# Patient Record
Sex: FEMALE | Race: WHITE | NOT HISPANIC OR LATINO | Employment: FULL TIME | URBAN - METROPOLITAN AREA
[De-identification: names, ages, dates, MRNs, and addresses within clinical notes are randomized per-mention and may not be internally consistent; named-entity substitution may affect disease eponyms.]

---

## 2013-11-13 LAB — EXTERNAL HIV CONFIRMATION: NORMAL

## 2019-09-26 ENCOUNTER — OFFICE VISIT (OUTPATIENT)
Dept: URGENT CARE | Facility: CLINIC | Age: 34
End: 2019-09-26
Payer: COMMERCIAL

## 2019-09-26 VITALS
TEMPERATURE: 97 F | WEIGHT: 285 LBS | OXYGEN SATURATION: 99 % | DIASTOLIC BLOOD PRESSURE: 88 MMHG | BODY MASS INDEX: 43.19 KG/M2 | HEART RATE: 92 BPM | RESPIRATION RATE: 20 BRPM | HEIGHT: 68 IN | SYSTOLIC BLOOD PRESSURE: 126 MMHG

## 2019-09-26 DIAGNOSIS — K13.79 PAIN IN MOUTH: Primary | ICD-10-CM

## 2019-09-26 PROCEDURE — 99213 OFFICE O/P EST LOW 20 MIN: CPT | Performed by: PHYSICIAN ASSISTANT

## 2019-09-26 RX ORDER — FLUCONAZOLE 100 MG/1
100 TABLET ORAL DAILY
Qty: 7 TABLET | Refills: 0 | Status: SHIPPED | OUTPATIENT
Start: 2019-09-26 | End: 2019-10-03

## 2019-09-26 NOTE — PROGRESS NOTES
[facility]        NAME: Whitney Troncoso is a 30 y o  female  : 1985    MRN: [id number]  DATE: 2019  TIME: 6:06 PM    Assessment and Plan   Pain in mouth [K13 79]  1  Pain in mouth  fluconazole (DIFLUCAN) 100 mg tablet         Patient Instructions     Start Diflucan as prescribed  If no improvement follow up with family doctor for further evaluation  Follow up with PCP in 3-5 days  Proceed to  ER if symptoms worsen  Chief Complaint     Chief Complaint   Patient presents with    Cough     cough,sore throat, chest congestion for 1 week    Oral Pain     tongue is very sore for 1 week         History of Present Illness       Patient presents with a sore throat for the past week but now she is having at tongue pain which she describes as a burning pain  Patient denies any oral lesions or further cold symptoms fever chills  Patient states the pain extends into her esophagus  She has not taken any recent antibiotics  Denies any skin rashes  Review of Systems   Review of Systems   Constitutional: Negative for chills and fever  HENT: Positive for sore throat  Negative for dental problem, postnasal drip and trouble swallowing  Respiratory: Negative for cough  Gastrointestinal: Negative for nausea and vomiting  Musculoskeletal: Negative for arthralgias and myalgias  Skin: Negative for rash  Hematological: Negative for adenopathy           Current Medications       Current Outpatient Medications:     fluconazole (DIFLUCAN) 100 mg tablet, Take 1 tablet (100 mg total) by mouth daily for 7 days, Disp: 7 tablet, Rfl: 0    Current Allergies     Allergies as of 2019 - Reviewed 2019   Allergen Reaction Noted    Shellfish-derived products  2019    Tetanus toxoid Hives and Edema 2016            The following portions of the patient's history were reviewed and updated as appropriate: allergies, current medications, past family history, past medical history, past social history, past surgical history and problem list      History reviewed  No pertinent past medical history  Past Surgical History:   Procedure Laterality Date     SECTION      EYE SURGERY         History reviewed  No pertinent family history  Medications have been verified  Objective   /88   Pulse 92   Temp (!) 97 °F (36 1 °C) (Tympanic)   Resp 20   Ht 5' 8" (1 727 m)   Wt 129 kg (285 lb)   SpO2 99%   BMI 43 33 kg/m²        Physical Exam     Physical Exam   Constitutional: She is oriented to person, place, and time  She appears well-developed and well-nourished  HENT:   Head: Normocephalic and atraumatic  Right Ear: External ear normal    Left Ear: External ear normal    Nose: Nose normal    Geographic tongue  No oral plaques  Posterior pharynx soft palate uvula without any erythema no exudates  Neck: Neck supple  Cardiovascular: Normal rate, regular rhythm and normal heart sounds  Pulmonary/Chest: Effort normal and breath sounds normal    Lymphadenopathy:     She has no cervical adenopathy  Neurological: She is alert and oriented to person, place, and time  Skin: Skin is warm and dry  Psychiatric: She has a normal mood and affect  Her behavior is normal    Nursing note and vitals reviewed

## 2019-09-26 NOTE — PATIENT INSTRUCTIONS
Start Diflucan as prescribed  If no improvement follow up with family doctor for further evaluation

## 2020-01-01 ENCOUNTER — OFFICE VISIT (OUTPATIENT)
Dept: URGENT CARE | Facility: CLINIC | Age: 35
End: 2020-01-01
Payer: COMMERCIAL

## 2020-01-01 VITALS
RESPIRATION RATE: 18 BRPM | DIASTOLIC BLOOD PRESSURE: 89 MMHG | HEART RATE: 103 BPM | SYSTOLIC BLOOD PRESSURE: 146 MMHG | TEMPERATURE: 99 F | OXYGEN SATURATION: 98 %

## 2020-01-01 DIAGNOSIS — J02.0 STREP THROAT: Primary | ICD-10-CM

## 2020-01-01 PROCEDURE — 99213 OFFICE O/P EST LOW 20 MIN: CPT | Performed by: NURSE PRACTITIONER

## 2020-01-01 RX ORDER — PREDNISONE 20 MG/1
20 TABLET ORAL 2 TIMES DAILY WITH MEALS
Qty: 10 TABLET | Refills: 0 | Status: SHIPPED | OUTPATIENT
Start: 2020-01-01 | End: 2020-01-06

## 2020-01-01 RX ORDER — AMOXICILLIN 875 MG/1
875 TABLET, COATED ORAL 2 TIMES DAILY
Qty: 20 TABLET | Refills: 0 | Status: SHIPPED | OUTPATIENT
Start: 2020-01-01 | End: 2020-01-11

## 2020-01-01 NOTE — PROGRESS NOTES
[facility]        NAME: Fabiana Cruz is a 30 y o  female  : 1985    MRN: [id number]  DATE: 2020  TIME: 2:58 PM    Assessment and Plan   Strep throat [J02 0]  1  Strep throat  amoxicillin (AMOXIL) 875 mg tablet    predniSONE 20 mg tablet         Patient Instructions   May use any of the following for symptomatic control of sore throat symptoms:  Saltwater gargles, tea with honey, lozenges, Chloraseptic spray  Follow up with PCP in 3-5 days  Proceed to  ER if symptoms worsen  Chief Complaint     Chief Complaint   Patient presents with    Sore Throat     Pt c/o a sore throat and swollen glands for a three days  History of Present Illness       Sore Throat    This is a new problem  Episode onset: Three days  Associated symptoms include headaches, swollen glands and trouble swallowing  Pertinent negatives include no coughing or shortness of breath  She has tried cool liquids for the symptoms  The treatment provided no relief  Review of Systems   Review of Systems   Constitutional: Positive for chills and fatigue  HENT: Positive for sore throat and trouble swallowing  Respiratory: Negative for cough, chest tightness, shortness of breath and wheezing  Neurological: Positive for headaches           Current Medications       Current Outpatient Medications:     amoxicillin (AMOXIL) 875 mg tablet, Take 1 tablet (875 mg total) by mouth 2 (two) times a day for 10 days, Disp: 20 tablet, Rfl: 0    predniSONE 20 mg tablet, Take 1 tablet (20 mg total) by mouth 2 (two) times a day with meals for 5 days, Disp: 10 tablet, Rfl: 0    Current Allergies     Allergies as of 2020 - Reviewed 2020   Allergen Reaction Noted    Shellfish-derived products  2019    Tetanus toxoid Hives and Edema 2016            The following portions of the patient's history were reviewed and updated as appropriate: allergies, current medications, past family history, past medical history, past social history, past surgical history and problem list      History reviewed  No pertinent past medical history  Past Surgical History:   Procedure Laterality Date     SECTION      EYE SURGERY         History reviewed  No pertinent family history  Medications have been verified  Objective   /89   Pulse 103   Temp 99 °F (37 2 °C)   Resp 18   SpO2 98%        Physical Exam     Physical Exam   Constitutional: She is oriented to person, place, and time  She appears well-developed and well-nourished  Non-toxic appearance  She does not appear ill  HENT:   Right Ear: Tympanic membrane normal    Left Ear: Tympanic membrane normal    Mouth/Throat: Posterior oropharyngeal erythema present  Tonsils are 2+ on the right  Tonsillar exudate  Lymphadenopathy:     She has cervical adenopathy  Neurological: She is alert and oriented to person, place, and time  Skin: Skin is warm and dry  Psychiatric: She has a normal mood and affect  Her behavior is normal    Nursing note and vitals reviewed

## 2020-01-01 NOTE — PATIENT INSTRUCTIONS
Strep Throat   WHAT YOU NEED TO KNOW:   Strep throat is a throat infection caused by bacteria  It is easily spread from person to person  DISCHARGE INSTRUCTIONS:   Call 911 for any of the following:   · You have trouble breathing  Return to the emergency department if:   · You have new symptoms like a bad headache, stiff neck, chest pain, or vomiting  · You are drooling because you cannot swallow your spit  Contact your healthcare provider if:   · You have a fever  · You have a rash or ear pain  · You have green, yellow-brown, or bloody mucus when you cough or blow your nose  · You are unable to drink anything  · You have questions or concerns about your condition or care  Medicines:   · Antibiotics  help treat your strep throat  You should feel better within 2 to 3 days after you start antibiotics  · Take your medicine as directed  Contact your healthcare provider if you think your medicine is not helping or if you have side effects  Tell him or her if you are allergic to any medicine  Keep a list of the medicines, vitamins, and herbs you take  Include the amounts, and when and why you take them  Bring the list or the pill bottles to follow-up visits  Carry your medicine list with you in case of an emergency  Manage your symptoms:   · Use lozenges, ice, soft foods, or popsicles  to soothe your throat  · Drink juice, milk shakes, or soup  if your throat is too sore to eat solid food  Drinking liquids can also help prevent dehydration  · Gargle with salt water  Mix ¼ teaspoon salt in a glass of warm water and gargle  This may help reduce swelling in your throat  · Do not smoke  Nicotine and other chemicals in cigarettes and cigars can cause lung damage and make your symptoms worse  Ask your healthcare provider for information if you currently smoke and need help to quit  E-cigarettes or smokeless tobacco still contain nicotine   Talk to your healthcare provider before you use these products  Return to work or school  24 hours after you start antibiotic medicine  Prevent the spread of strep throat:   · Wash your hands often  Use soap and water  Wash your hands after you use the bathroom, change a child's diapers, or sneeze  Wash your hands before you prepare or eat food  · Do not share food or drinks  Replace your toothbrush after you have taken antibiotics for 24 hours  Follow up with your healthcare provider as directed:  Write down your questions so you remember to ask them during your visits  © 2017 [employer] Information is for End User's use only and may not be sold, redistributed or otherwise used for commercial purposes  All illustrations and images included in CareNotes® are the copyrighted property of A [employer] M [employer]  or [employer]  The above information is an  only  It is not intended as medical advice for individual conditions or treatments  Talk to your doctor, nurse or pharmacist before following any medical regimen to see if it is safe and effective for you

## 2020-03-24 ENCOUNTER — OFFICE VISIT (OUTPATIENT)
Dept: URGENT CARE | Facility: CLINIC | Age: 35
End: 2020-03-24
Payer: COMMERCIAL

## 2020-03-24 VITALS
RESPIRATION RATE: 18 BRPM | WEIGHT: 293 LBS | TEMPERATURE: 98.5 F | DIASTOLIC BLOOD PRESSURE: 90 MMHG | HEART RATE: 101 BPM | BODY MASS INDEX: 44.41 KG/M2 | HEIGHT: 68 IN | OXYGEN SATURATION: 98 % | SYSTOLIC BLOOD PRESSURE: 161 MMHG

## 2020-03-24 DIAGNOSIS — J06.9 ACUTE URI: Primary | ICD-10-CM

## 2020-03-24 PROCEDURE — 99213 OFFICE O/P EST LOW 20 MIN: CPT | Performed by: PHYSICIAN ASSISTANT

## 2020-03-24 RX ORDER — AZITHROMYCIN 250 MG/1
TABLET, FILM COATED ORAL
Qty: 6 TABLET | Refills: 0 | Status: SHIPPED | OUTPATIENT
Start: 2020-03-24 | End: 2020-03-28

## 2020-03-24 NOTE — PROGRESS NOTES
[facility]    NAME: Nawaf Hackett is a 37 y o  female  : 1985    MRN: [id number]  DATE: 2020  TIME: 4:59 PM    Assessment and Plan   Acute URI [J06 9]  1  Acute URI  azithromycin (ZITHROMAX) 250 mg tablet       Patient Instructions     Patient Instructions   I have prescribed an antibiotic for the infection  Please take the antibiotic as prescribed and finish the entire prescription  I recommend that the patient takes an over the counter probiotic or eats yogurt with live cultures in it [place]) to keep good bacteria in the gut and help prevent diarrhea  Wash hands frequently to prevent the spread of infection  Can use over the counter cough and cold medications to help with symptoms  Ibuprofen and/or tylenol as needed for pain or fever  If not improving over the next 7-10 days, follow up with PCP  Chief Complaint     Chief Complaint   Patient presents with    Sore Throat     pain in throat , chest congestion, x 3 days       History of Present Illness   81-year-old female here with complaint of sore throat, cough and occasional chest congestion for the last 3 days  No fever chills  Feels like there is mucus in the back of her throat  Review of Systems   Review of Systems   Constitutional: Negative for appetite change, chills and fever  HENT: Positive for sore throat  Negative for congestion, ear discharge, ear pain, facial swelling, postnasal drip, sinus pressure and sneezing  Respiratory: Positive for cough  Negative for shortness of breath and wheezing  Neurological: Negative for headaches         Current Medications     Current Outpatient Medications:     levonorgestrel (MIRENA) 20 MCG/24HR IUD, 1 each by Intrauterine route once, Disp: , Rfl:     azithromycin (ZITHROMAX) 250 mg tablet, Take 2 tablets today then 1 tablet daily x 4 days, Disp: 6 tablet, Rfl: 0    Current Allergies     Allergies as of 2020 - Reviewed 2020   Allergen Reaction Noted  Shellfish-derived products  2019    Tetanus toxoid Hives and Edema 2016          The following portions of the patient's history were reviewed and updated as appropriate: allergies, current medications, past family history, past medical history, past social history, past surgical history and problem list    Past Medical History:   Diagnosis Date    GERD (gastroesophageal reflux disease)     MRSA infection     bladder/ urine     Past Surgical History:   Procedure Laterality Date     SECTION      EYE SURGERY       History reviewed  No pertinent family history    Social History     Socioeconomic History    Marital status: /Civil Union     Spouse name: Not on file    Number of children: Not on file    Years of education: Not on file    Highest education level: Not on file   Occupational History    Not on file   Social Needs    Financial resource strain: Not on file    Food insecurity:     Worry: Not on file     Inability: Not on file    Transportation needs:     Medical: Not on file     Non-medical: Not on file   Tobacco Use    Smoking status: Never Smoker    Smokeless tobacco: Never Used   Substance and Sexual Activity    Alcohol use: Not Currently    Drug use: Never    Sexual activity: Yes     Birth control/protection: IUD   Lifestyle    Physical activity:     Days per week: Not on file     Minutes per session: Not on file    Stress: Not on file   Relationships    Social connections:     Talks on phone: Not on file     Gets together: Not on file     Attends Hindu service: Not on file     Active member of club or organization: Not on file     Attends meetings of clubs or organizations: Not on file     Relationship status: Not on file    Intimate partner violence:     Fear of current or ex partner: Not on file     Emotionally abused: Not on file     Physically abused: Not on file     Forced sexual activity: Not on file   Other Topics Concern    Not on file Social History Narrative    Not on file     Medications have been verified  Objective   /90   Pulse 101   Temp 98 5 °F (36 9 °C) (Tympanic)   Resp 18   Ht 5' 8" (1 727 m)   Wt 134 kg (295 lb)   LMP 03/24/2020 (Approximate)   SpO2 98%   BMI 44 85 kg/m²      Physical Exam   Physical Exam   Constitutional: She appears well-developed and well-nourished  No distress  HENT:   Head: Normocephalic and atraumatic  Right Ear: Tympanic membrane normal    Left Ear: Tympanic membrane normal    Nose: Nose normal  No mucosal edema or rhinorrhea  Right sinus exhibits no maxillary sinus tenderness and no frontal sinus tenderness  Left sinus exhibits no maxillary sinus tenderness and no frontal sinus tenderness  Mouth/Throat: Posterior oropharyngeal erythema present  No oropharyngeal exudate or posterior oropharyngeal edema  Eyes: Conjunctivae are normal    Cardiovascular: Normal rate, regular rhythm and normal heart sounds  No murmur heard  Nursing note and vitals reviewed

## 2020-04-01 ENCOUNTER — OFFICE VISIT (OUTPATIENT)
Dept: URGENT CARE | Facility: CLINIC | Age: 35
End: 2020-04-01
Payer: COMMERCIAL

## 2020-04-01 VITALS
WEIGHT: 293 LBS | DIASTOLIC BLOOD PRESSURE: 87 MMHG | HEIGHT: 68 IN | SYSTOLIC BLOOD PRESSURE: 130 MMHG | RESPIRATION RATE: 18 BRPM | BODY MASS INDEX: 44.41 KG/M2 | HEART RATE: 108 BPM | TEMPERATURE: 97.7 F | OXYGEN SATURATION: 98 %

## 2020-04-01 DIAGNOSIS — J06.9 ACUTE URI: Primary | ICD-10-CM

## 2020-04-01 PROCEDURE — 99213 OFFICE O/P EST LOW 20 MIN: CPT | Performed by: PHYSICIAN ASSISTANT

## 2020-04-01 RX ORDER — METHYLPREDNISOLONE 4 MG/1
TABLET ORAL
Qty: 21 TABLET | Refills: 0 | Status: SHIPPED | OUTPATIENT
Start: 2020-04-01 | End: 2020-05-01 | Stop reason: ALTCHOICE

## 2020-04-01 RX ORDER — ALBUTEROL SULFATE 90 UG/1
2 AEROSOL, METERED RESPIRATORY (INHALATION) EVERY 6 HOURS PRN
Qty: 8.5 G | Refills: 0 | Status: SHIPPED | OUTPATIENT
Start: 2020-04-01 | End: 2020-05-01 | Stop reason: ALTCHOICE

## 2021-08-31 ENCOUNTER — TELEPHONE (OUTPATIENT)
Dept: ADMINISTRATIVE | Facility: OTHER | Age: 36
End: 2021-08-31

## 2021-08-31 NOTE — TELEPHONE ENCOUNTER
----- Message from Leona Baer sent at 8/30/2021  3:20 PM EDT -----  Regarding: HIV screen  08/30/21 3:20 PM    Hello, our patient Jair Valdez has had HIV completed/performed  Please assist in updating the patient chart by pulling the Care Everywhere (CE) document  The date of service is 11/13/13       Thank you,  Leona [facility]

## 2021-09-02 ENCOUNTER — OFFICE VISIT (OUTPATIENT)
Dept: FAMILY MEDICINE CLINIC | Facility: CLINIC | Age: 36
End: 2021-09-02
Payer: COMMERCIAL

## 2021-09-02 VITALS
BODY MASS INDEX: 44.41 KG/M2 | HEART RATE: 97 BPM | WEIGHT: 293 LBS | OXYGEN SATURATION: 96 % | SYSTOLIC BLOOD PRESSURE: 132 MMHG | TEMPERATURE: 99 F | HEIGHT: 68 IN | DIASTOLIC BLOOD PRESSURE: 88 MMHG

## 2021-09-02 DIAGNOSIS — R10.9 ABDOMINAL CRAMPING: ICD-10-CM

## 2021-09-02 DIAGNOSIS — Z76.89 ENCOUNTER TO ESTABLISH CARE WITH NEW DOCTOR: Primary | ICD-10-CM

## 2021-09-02 DIAGNOSIS — Z13.220 LIPID SCREENING: ICD-10-CM

## 2021-09-02 DIAGNOSIS — E66.01 MORBID OBESITY (HCC): ICD-10-CM

## 2021-09-02 DIAGNOSIS — Z12.4 CERVICAL CANCER SCREENING: ICD-10-CM

## 2021-09-02 DIAGNOSIS — Z79.899 LONG-TERM CURRENT USE OF PROTON PUMP INHIBITOR THERAPY: ICD-10-CM

## 2021-09-02 DIAGNOSIS — R11.0 POSTPRANDIAL NAUSEA: ICD-10-CM

## 2021-09-02 DIAGNOSIS — K21.9 GASTROESOPHAGEAL REFLUX DISEASE, UNSPECIFIED WHETHER ESOPHAGITIS PRESENT: ICD-10-CM

## 2021-09-02 DIAGNOSIS — K52.9 POSTPRANDIAL DIARRHEA: ICD-10-CM

## 2021-09-02 DIAGNOSIS — Z13.29 THYROID DISORDER SCREEN: ICD-10-CM

## 2021-09-02 PROCEDURE — 99203 OFFICE O/P NEW LOW 30 MIN: CPT | Performed by: FAMILY MEDICINE

## 2021-09-02 RX ORDER — OMEPRAZOLE 20 MG/1
20 CAPSULE, DELAYED RELEASE ORAL 2 TIMES DAILY
COMMUNITY
End: 2021-09-23

## 2021-09-02 NOTE — PROGRESS NOTES
Assessment/Plan:         Diagnoses and all orders for this visit:    Encounter to establish care with new doctor    Postprandial diarrhea  -     Comprehensive metabolic panel; Future  -     CBC and differential; Future  -     TSH, 3rd generation with Free T4 reflex; Future  -     Ambulatory referral to Gastroenterology; Future    Postprandial nausea  -     Comprehensive metabolic panel; Future  -     CBC and differential; Future  -     TSH, 3rd generation with Free T4 reflex; Future  -     H  pylori antigen, stool; Future  -     Ambulatory referral to Gastroenterology; Future    Abdominal cramping  -     Comprehensive metabolic panel; Future  -     CBC and differential; Future  -     TSH, 3rd generation with Free T4 reflex; Future  -     H  pylori antigen, stool; Future  -     Ambulatory referral to Gastroenterology; Future    Gastroesophageal reflux disease, unspecified whether esophagitis present  -     H  pylori antigen, stool; Future  -     Ambulatory referral to Gastroenterology; Future    Long-term current use of proton pump inhibitor therapy  -     H  pylori antigen, stool; Future  -     Ambulatory referral to Gastroenterology; Future    Morbid obesity (HCC)  -     TSH, 3rd generation with Free T4 reflex; Future    Cervical cancer screening  -     Ambulatory referral to Gynecology; Future    Lipid screening  -     Lipid panel; Future    Thyroid disorder screen  -     TSH, 3rd generation with Free T4 reflex; Future    Other orders  -     omeprazole (PriLOSEC) 20 mg delayed release capsule; Take 20 mg by mouth 2 (two) times a day          Subjective:   Chief Complaint   Patient presents with   [address] pt - Pt has some concerns about diarrhea after eating @ restaurants        Patient ID: Elin Fletcher is a 31 y o  female      New pt  Has not had a PCP for many years  Sees [facility]- had her  karla IUD removed and new one re-inserted 2020  C/o almost or at least a year and a half gets postprandial abd cramping, nausea, diarrhea mostly after eating out at any restaurant, sx resolve after she has a bowel movement (diarrhea) "it got worse this year" admits that sx do occur when she eats food she has prepared at home, "but not as bad"  Never any blood in the stool, and except for the cramping, no abd pain assoc  bp slightly elevated- pt states nervous coming here today      The following portions of the patient's history were reviewed and updated as appropriate: allergies, current medications, past family history, past medical history, past social history, past surgical history and problem list     Review of Systems   All other systems reviewed and are negative  Objective:      /88 (BP Location: Right arm, Patient Position: Sitting, Cuff Size: Large)   Pulse 97   Temp 99 °F (37 2 °C) (Tympanic)   Ht 5' 8" (1 727 m)   Wt (!) 152 kg (335 lb 6 4 oz)   SpO2 96%   BMI 51 00 kg/m²   stool       Physical Exam  Vitals and nursing note reviewed  Constitutional:       General: She is not in acute distress  Appearance: She is well-developed  She is not ill-appearing, toxic-appearing or diaphoretic  HENT:      Head: Normocephalic and atraumatic  Eyes:      General: Lids are normal       Conjunctiva/sclera: Conjunctivae normal       Pupils: Pupils are equal, round, and reactive to light  Neck:      Thyroid: No thyroid mass or thyromegaly  Vascular: No JVD  Trachea: Trachea normal    Cardiovascular:      Rate and Rhythm: Normal rate and regular rhythm  Heart sounds: Normal heart sounds  Comments: No edema  Pulmonary:      Effort: Pulmonary effort is normal       Breath sounds: Normal breath sounds  Abdominal:      General: Bowel sounds are normal  There is no distension  Palpations: Abdomen is soft  There is no hepatomegaly, splenomegaly or mass  Tenderness: There is no abdominal tenderness  Musculoskeletal:      Cervical back: Neck supple  Lymphadenopathy:      Cervical: No cervical adenopathy  Upper Body:      Right upper body: No supraclavicular adenopathy  Left upper body: No supraclavicular adenopathy  Skin:     General: Skin is warm and dry  Capillary Refill: Capillary refill takes less than 2 seconds  Coloration: Skin is not pale  Neurological:      Mental Status: She is alert and oriented to person, place, and time  Gait: Gait normal    Psychiatric:         Mood and Affect: Mood is anxious (mildly)  Behavior: Behavior normal  Behavior is cooperative  BMI Counseling: Body mass index is 51 kg/m²  The BMI is above normal  Nutrition recommendations include reducing portion sizes and decreasing overall calorie intake  Exercise recommendations include exercising 3-5 times per week

## 2021-09-23 ENCOUNTER — CONSULT (OUTPATIENT)
Dept: GASTROENTEROLOGY | Facility: CLINIC | Age: 36
End: 2021-09-23
Payer: COMMERCIAL

## 2021-09-23 VITALS
RESPIRATION RATE: 16 BRPM | SYSTOLIC BLOOD PRESSURE: 154 MMHG | HEART RATE: 118 BPM | TEMPERATURE: 97.3 F | DIASTOLIC BLOOD PRESSURE: 98 MMHG | HEIGHT: 68 IN | BODY MASS INDEX: 44.41 KG/M2 | WEIGHT: 293 LBS

## 2021-09-23 DIAGNOSIS — K21.9 GASTROESOPHAGEAL REFLUX DISEASE, UNSPECIFIED WHETHER ESOPHAGITIS PRESENT: ICD-10-CM

## 2021-09-23 DIAGNOSIS — K52.9 POSTPRANDIAL DIARRHEA: ICD-10-CM

## 2021-09-23 DIAGNOSIS — R11.0 POSTPRANDIAL NAUSEA: ICD-10-CM

## 2021-09-23 DIAGNOSIS — R10.9 ABDOMINAL CRAMPING: ICD-10-CM

## 2021-09-23 DIAGNOSIS — Z79.899 LONG-TERM CURRENT USE OF PROTON PUMP INHIBITOR THERAPY: ICD-10-CM

## 2021-09-23 PROCEDURE — 3008F BODY MASS INDEX DOCD: CPT | Performed by: INTERNAL MEDICINE

## 2021-09-23 PROCEDURE — 1036F TOBACCO NON-USER: CPT | Performed by: INTERNAL MEDICINE

## 2021-09-23 PROCEDURE — 99243 OFF/OP CNSLTJ NEW/EST LOW 30: CPT | Performed by: INTERNAL MEDICINE

## 2021-09-23 RX ORDER — OMEPRAZOLE 40 MG/1
40 CAPSULE, DELAYED RELEASE ORAL 2 TIMES DAILY
Qty: 90 CAPSULE | Refills: 3 | Status: SHIPPED | OUTPATIENT
Start: 2021-09-23 | End: 2022-02-22 | Stop reason: SDUPTHER

## 2021-09-23 RX ORDER — SUCRALFATE ORAL 1 G/10ML
1 SUSPENSION ORAL 4 TIMES DAILY
Qty: 420 ML | Refills: 3 | Status: SHIPPED | OUTPATIENT
Start: 2021-09-23 | End: 2022-01-11

## 2021-09-23 NOTE — PROGRESS NOTES
[facility] - Outpatient Consultation  Rose Cesar 31 y o  female MRN: [id number]  Encounter: [id number]    HPI:    Rose Cesar is a 31 y o  female who presents with complaints of chronic GERD and chronic diarrhea  Diarrhea  She reports postprandial diarrhea for 2 years, getting worse  This is caused by all foods but more often when eating out  She cannot identify specific food triggers  She drinks 2 glasses of milk at night to help manage her GERD; she also uses cream in he coffee; eats cheese; eatch ice cream daily; and eats sour cream occasionally  She has 4 BMs in the morning within 30 minutes of each other: the first 2 are formed, then diarrhea  She then has 2 more watery BMs after lunch; 2 more BMs after dinner; and once more before bed  All stools are watery except the first 2 in the morning  Recently has been seeing small blood with wiping  She reports that whenever she eats out, regardless of food or restaurant, she gets cold sweats and nausea, followed by diarrhea  GERD  She has had GERD since age 27  She takes omeprazole 20 mg bid, prn Tums, and 2 glasses of milk at night  She often wakes up choking and coughing at night  She avoids spicy spicy foods  She does eat before bed  No FH of IBD or GI cancers  She has been gaining weight; current BMI is 52  No endoscopic evaluation  REVIEW OF SYSTEMS:  CONSTITUTIONAL: Denies any fever, chills, rigors, and weight loss  HEENT: No earache or tinnitus  Denies hearing loss or visual disturbances  CARDIOVASCULAR: No chest pain or palpitations  RESPIRATORY: Denies any cough, hemoptysis, shortness of breath or dyspnea on exertion  GASTROINTESTINAL: As noted in the History of Present Illness  GENITOURINARY: No problems with urination  Denies any hematuria or dysuria  NEUROLOGIC: No dizziness or vertigo, denies headaches  MUSCULOSKELETAL: Denies any muscle or joint pain     SKIN: Denies skin rashes or itching  ENDOCRINE: Denies excessive thirst  Denies intolerance to heat or cold  PSYCHOSOCIAL: Denies depression or anxiety  Denies any recent memory loss  Historical Information   Past Medical History:   Diagnosis Date    GERD (gastroesophageal reflux disease)     MRSA infection     bladder/ urine     Past Surgical History:   Procedure Laterality Date     SECTION      EYE SURGERY      strabismus     Social History   Social History     Substance and Sexual Activity   Alcohol Use Not Currently    Alcohol/week: 0 0 standard drinks    Comment: rare     Social History     Substance and Sexual Activity   Drug Use Never     Social History     Tobacco Use   Smoking Status Never Smoker   Smokeless Tobacco Never Used     Family History   Problem Relation Age of Onset    COPD Mother     Colon polyps Mother     Diabetes Father     Colon polyps Father     No Known Problems Sister     No Known Problems Brother     No Known Problems Son     No Known Problems Daughter     No Known Problems Brother     No Known Problems Brother     No Known Problems Brother     No Known Problems Sister     Gallbladder disease Sister        Meds/Allergies       Current Outpatient Medications:     levonorgestrel (MIRENA) 20 MCG/24HR IUD    omeprazole (PriLOSEC) 40 MG capsule    sucralfate (CARAFATE) 1 g/10 mL suspension    Allergies   Allergen Reactions    Blackberry Flavor - Food Allergy Hives    Shellfish-Derived Products - Food Allergy     Tetanus Toxoid Hives and Edema       Objective   Blood pressure 154/98, pulse (!) 118, temperature (!) 97 3 °F (36 3 °C), temperature source Tympanic, resp  rate 16, height 5' 8" (1 727 m), weight (!) 156 kg (344 lb)  Body mass index is 52 31 kg/m²  PHYSICAL EXAM:    General Appearance:   Alert, cooperative, no distress   HEENT:   Normocephalic, atraumatic, anicteric       Neck:  Supple, symmetrical, trachea midline   Lungs:   Clear to auscultation bilaterally; no rales, rhonchi or wheezing; respirations unlabored    Heart[de-identified]   Regular rate and rhythm; no murmur, rub, or gallop  Abdomen:   Soft, non-tender, non-distended; normal bowel sounds; no masses, no organomegaly    Genitalia:   Deferred    Rectal:   Deferred    Extremities:  No cyanosis, clubbing or edema    Pulses:  2+ and symmetric    Skin:  No jaundice, rashes, or lesions    Lymph nodes:  No palpable cervical lymphadenopathy        Lab Results:   No visits with results within 1 Day(s) from this visit  Latest known visit with results is:   Telephone on 08/31/2021   Component Date Value    HIV Confirmation 11/13/2013 Non-Reactive        No results found for: WBC, HGB, HCT, MCV, PLT    No results found for: NA, SODIUM, K, CL, CO2, ANIONGAP, AGAP, BUN, CREATININE, GLUC, GLUF, CALCIUM, AST, ALT, ALKPHOS, PROT, TP, BILITOT, TBILI, EGFR    No results found for: CRP    No results found for: [id number], TSH    No results found for: IRON, TIBC, FERRITIN    Radiology Results:   No results found  ______________________________________________________________________  ASSESSMENT AND PLAN:    Mark Bazzi is a 31 y o  female with chronic diarrhea and GERD  Diarrhea  This has not been worked up so far  I suspect at least a component of lactose intolerance, but the differential diagnosis is broad  We will start with the following:  - I asked her for a 7-10 day trial of strict lactose avoidance  She will do her best to comply  - Stool for C  diff, o&p, other bacteria, calprotectin  - Blood for TTG, IgA, CRP, TSH  - She is also getting CBC and CMP  - Colonoscopy if above is unrevealing    GERD  Liekly exacerbated by obesity,  which she understands  She will need EGD to screen for Aviles's given long-standing symptoms; we will wait to do this until we know if a colonoscopy is also needed    - Increase omeprazole to 40 mg bid for now as I asked to stop drinking milk at night  - Start Carafate 1 g tid  - Elevate head of bed  - Avoid eating 3 hours prior to bedtime  - Weight loss encouraged  - Avoid spicy and acidic foods  - EGD in the near future    Return in 3 months  1  Gastroesophageal reflux disease, unspecified whether esophagitis present    2  Postprandial diarrhea    3  Postprandial nausea    4  Abdominal cramping    5   Long-term current use of proton pump inhibitor therapy        Orders Placed This Encounter   Procedures    Calprotectin,Fecal    Ova and parasite examination    Stool Enteric Bacterial Panel by PCR    Clostridium difficile toxin by PCR with EIA    Tissue transglutaminase, IgA    IgA    C-reactive protein

## 2021-09-25 ENCOUNTER — APPOINTMENT (OUTPATIENT)
Dept: LAB | Facility: CLINIC | Age: 36
End: 2021-09-25
Payer: COMMERCIAL

## 2021-09-25 DIAGNOSIS — Z79.899 LONG-TERM CURRENT USE OF PROTON PUMP INHIBITOR THERAPY: ICD-10-CM

## 2021-09-25 DIAGNOSIS — K21.9 GASTROESOPHAGEAL REFLUX DISEASE, UNSPECIFIED WHETHER ESOPHAGITIS PRESENT: ICD-10-CM

## 2021-09-25 DIAGNOSIS — K52.9 POSTPRANDIAL DIARRHEA: ICD-10-CM

## 2021-09-25 DIAGNOSIS — R10.9 ABDOMINAL CRAMPING: ICD-10-CM

## 2021-09-25 DIAGNOSIS — Z13.29 THYROID DISORDER SCREEN: ICD-10-CM

## 2021-09-25 DIAGNOSIS — R11.0 POSTPRANDIAL NAUSEA: ICD-10-CM

## 2021-09-25 DIAGNOSIS — Z13.220 LIPID SCREENING: ICD-10-CM

## 2021-09-25 DIAGNOSIS — E66.01 MORBID OBESITY (HCC): ICD-10-CM

## 2021-09-25 LAB
ALBUMIN SERPL BCP-MCNC: 3.1 G/DL (ref 3.5–5)
ALP SERPL-CCNC: 129 U/L (ref 46–116)
ALT SERPL W P-5'-P-CCNC: 26 U/L (ref 12–78)
ANION GAP SERPL CALCULATED.3IONS-SCNC: 2 MMOL/L (ref 4–13)
AST SERPL W P-5'-P-CCNC: 15 U/L (ref 5–45)
BASOPHILS # BLD AUTO: 0.06 THOUSANDS/ΜL (ref 0–0.1)
BASOPHILS NFR BLD AUTO: 1 % (ref 0–1)
BILIRUB SERPL-MCNC: 0.42 MG/DL (ref 0.2–1)
BUN SERPL-MCNC: 12 MG/DL (ref 5–25)
CALCIUM ALBUM COR SERPL-MCNC: 9.8 MG/DL (ref 8.3–10.1)
CALCIUM SERPL-MCNC: 9.1 MG/DL (ref 8.3–10.1)
CHLORIDE SERPL-SCNC: 107 MMOL/L (ref 100–108)
CHOLEST SERPL-MCNC: 159 MG/DL (ref 50–200)
CO2 SERPL-SCNC: 28 MMOL/L (ref 21–32)
CREAT SERPL-MCNC: 0.82 MG/DL (ref 0.6–1.3)
CRP SERPL QL: 27.9 MG/L
EOSINOPHIL # BLD AUTO: 0.24 THOUSAND/ΜL (ref 0–0.61)
EOSINOPHIL NFR BLD AUTO: 2 % (ref 0–6)
ERYTHROCYTE [DISTWIDTH] IN BLOOD BY AUTOMATED COUNT: 13.2 % (ref 11.6–15.1)
GFR SERPL CREATININE-BSD FRML MDRD: 94 ML/MIN/1.73SQ M
GLUCOSE P FAST SERPL-MCNC: 85 MG/DL (ref 65–99)
HCT VFR BLD AUTO: 38.8 % (ref 34.8–46.1)
HDLC SERPL-MCNC: 39 MG/DL
HGB BLD-MCNC: 12.6 G/DL (ref 11.5–15.4)
IGA SERPL-MCNC: 273 MG/DL (ref 70–400)
IMM GRANULOCYTES # BLD AUTO: 0.05 THOUSAND/UL (ref 0–0.2)
IMM GRANULOCYTES NFR BLD AUTO: 1 % (ref 0–2)
LDLC SERPL CALC-MCNC: 99 MG/DL (ref 0–100)
LYMPHOCYTES # BLD AUTO: 2.97 THOUSANDS/ΜL (ref 0.6–4.47)
LYMPHOCYTES NFR BLD AUTO: 27 % (ref 14–44)
MCH RBC QN AUTO: 26.6 PG (ref 26.8–34.3)
MCHC RBC AUTO-ENTMCNC: 32.5 G/DL (ref 31.4–37.4)
MCV RBC AUTO: 82 FL (ref 82–98)
MONOCYTES # BLD AUTO: 0.55 THOUSAND/ΜL (ref 0.17–1.22)
MONOCYTES NFR BLD AUTO: 5 % (ref 4–12)
NEUTROPHILS # BLD AUTO: 7 THOUSANDS/ΜL (ref 1.85–7.62)
NEUTS SEG NFR BLD AUTO: 64 % (ref 43–75)
NONHDLC SERPL-MCNC: 120 MG/DL
NRBC BLD AUTO-RTO: 0 /100 WBCS
PLATELET # BLD AUTO: 315 THOUSANDS/UL (ref 149–390)
PMV BLD AUTO: 9.3 FL (ref 8.9–12.7)
POTASSIUM SERPL-SCNC: 4.6 MMOL/L (ref 3.5–5.3)
PROT SERPL-MCNC: 7.7 G/DL (ref 6.4–8.2)
RBC # BLD AUTO: 4.74 MILLION/UL (ref 3.81–5.12)
SODIUM SERPL-SCNC: 137 MMOL/L (ref 136–145)
T4 FREE SERPL-MCNC: 0.83 NG/DL (ref 0.76–1.46)
TRIGL SERPL-MCNC: 104 MG/DL
TSH SERPL DL<=0.05 MIU/L-ACNC: 4.23 UIU/ML (ref 0.36–3.74)
WBC # BLD AUTO: 10.87 THOUSAND/UL (ref 4.31–10.16)

## 2021-09-25 PROCEDURE — 85025 COMPLETE CBC W/AUTO DIFF WBC: CPT

## 2021-09-25 PROCEDURE — 80053 COMPREHEN METABOLIC PANEL: CPT

## 2021-09-25 PROCEDURE — 80061 LIPID PANEL: CPT

## 2021-09-25 PROCEDURE — 86140 C-REACTIVE PROTEIN: CPT

## 2021-09-25 PROCEDURE — 84439 ASSAY OF FREE THYROXINE: CPT

## 2021-09-25 PROCEDURE — 83516 IMMUNOASSAY NONANTIBODY: CPT

## 2021-09-25 PROCEDURE — 82784 ASSAY IGA/IGD/IGG/IGM EACH: CPT

## 2021-09-25 PROCEDURE — 36415 COLL VENOUS BLD VENIPUNCTURE: CPT

## 2021-09-25 PROCEDURE — 84443 ASSAY THYROID STIM HORMONE: CPT

## 2021-09-28 ENCOUNTER — APPOINTMENT (OUTPATIENT)
Dept: LAB | Facility: CLINIC | Age: 36
End: 2021-09-28
Payer: COMMERCIAL

## 2021-09-28 LAB — TTG IGA SER-ACNC: <2 U/ML (ref 0–3)

## 2021-09-28 PROCEDURE — 87177 OVA AND PARASITES SMEARS: CPT

## 2021-09-28 PROCEDURE — 87505 NFCT AGENT DETECTION GI: CPT

## 2021-09-28 PROCEDURE — 87493 C DIFF AMPLIFIED PROBE: CPT

## 2021-09-28 PROCEDURE — 83993 ASSAY FOR CALPROTECTIN FECAL: CPT

## 2021-09-28 PROCEDURE — 87338 HPYLORI STOOL AG IA: CPT

## 2021-09-28 PROCEDURE — 87209 SMEAR COMPLEX STAIN: CPT

## 2021-09-29 LAB
C DIFF TOX GENS STL QL NAA+PROBE: NEGATIVE
CAMPYLOBACTER DNA SPEC NAA+PROBE: NORMAL
H PYLORI AG STL QL IA: NEGATIVE
O+P STL CONC: NORMAL
SALMONELLA DNA SPEC QL NAA+PROBE: NORMAL
SHIGA TOXIN STX GENE SPEC NAA+PROBE: NORMAL
SHIGELLA DNA SPEC QL NAA+PROBE: NORMAL

## 2021-09-30 LAB — CALPROTECTIN STL-MCNT: 184 UG/G (ref 0–120)

## 2021-10-02 ENCOUNTER — PREP FOR PROCEDURE (OUTPATIENT)
Dept: GASTROENTEROLOGY | Facility: MEDICAL CENTER | Age: 36
End: 2021-10-02

## 2021-10-02 DIAGNOSIS — R19.5 ELEVATED FECAL CALPROTECTIN: ICD-10-CM

## 2021-10-02 DIAGNOSIS — R19.7 DIARRHEA, UNSPECIFIED TYPE: Primary | ICD-10-CM

## 2021-10-02 DIAGNOSIS — R79.82 ELEVATED C-REACTIVE PROTEIN (CRP): ICD-10-CM

## 2021-10-04 ENCOUNTER — TELEPHONE (OUTPATIENT)
Dept: GASTROENTEROLOGY | Facility: MEDICAL CENTER | Age: 36
End: 2021-10-04

## 2021-11-18 ENCOUNTER — OFFICE VISIT (OUTPATIENT)
Dept: FAMILY MEDICINE CLINIC | Facility: CLINIC | Age: 36
End: 2021-11-18
Payer: COMMERCIAL

## 2021-11-18 VITALS
BODY MASS INDEX: 44.41 KG/M2 | TEMPERATURE: 97.7 F | OXYGEN SATURATION: 100 % | DIASTOLIC BLOOD PRESSURE: 98 MMHG | HEART RATE: 98 BPM | WEIGHT: 293 LBS | HEIGHT: 68 IN | SYSTOLIC BLOOD PRESSURE: 148 MMHG

## 2021-11-18 DIAGNOSIS — R79.89 ELEVATED TSH: ICD-10-CM

## 2021-11-18 DIAGNOSIS — Z83.49 FAMILY HISTORY OF THYROID DISEASE IN MOTHER: ICD-10-CM

## 2021-11-18 DIAGNOSIS — R03.0 ELEVATED BLOOD PRESSURE READING IN OFFICE WITHOUT DIAGNOSIS OF HYPERTENSION: Primary | ICD-10-CM

## 2021-11-18 DIAGNOSIS — Z83.49 FAMILY HISTORY OF THYROID DISEASE IN FATHER: ICD-10-CM

## 2021-11-18 PROCEDURE — 1036F TOBACCO NON-USER: CPT | Performed by: FAMILY MEDICINE

## 2021-11-18 PROCEDURE — 3008F BODY MASS INDEX DOCD: CPT | Performed by: FAMILY MEDICINE

## 2021-11-18 PROCEDURE — 99214 OFFICE O/P EST MOD 30 MIN: CPT | Performed by: FAMILY MEDICINE

## 2021-12-03 ENCOUNTER — OFFICE VISIT (OUTPATIENT)
Dept: URGENT CARE | Facility: CLINIC | Age: 36
End: 2021-12-03
Payer: COMMERCIAL

## 2021-12-03 VITALS — OXYGEN SATURATION: 98 % | TEMPERATURE: 97.8 F | RESPIRATION RATE: 18 BRPM | HEART RATE: 94 BPM

## 2021-12-03 DIAGNOSIS — Z11.59 SPECIAL SCREENING EXAMINATION FOR VIRAL DISEASE: Primary | ICD-10-CM

## 2021-12-03 PROCEDURE — 99213 OFFICE O/P EST LOW 20 MIN: CPT | Performed by: NURSE PRACTITIONER

## 2021-12-03 PROCEDURE — 0241U HB NFCT DS VIR RESP RNA 4 TRGT: CPT | Performed by: NURSE PRACTITIONER

## 2021-12-04 ENCOUNTER — TELEPHONE (OUTPATIENT)
Dept: URGENT CARE | Facility: CLINIC | Age: 36
End: 2021-12-04

## 2021-12-04 DIAGNOSIS — R05.9 COUGH: Primary | ICD-10-CM

## 2021-12-04 LAB
FLUAV RNA RESP QL NAA+PROBE: NEGATIVE
FLUBV RNA RESP QL NAA+PROBE: NEGATIVE
RSV RNA RESP QL NAA+PROBE: NEGATIVE
SARS-COV-2 RNA RESP QL NAA+PROBE: POSITIVE

## 2021-12-04 RX ORDER — ALBUTEROL SULFATE 90 UG/1
2 AEROSOL, METERED RESPIRATORY (INHALATION) EVERY 6 HOURS PRN
Qty: 8.5 G | Refills: 0 | Status: SHIPPED | OUTPATIENT
Start: 2021-12-04 | End: 2021-12-11

## 2021-12-06 ENCOUNTER — TELEPHONE (OUTPATIENT)
Dept: URGENT CARE | Facility: CLINIC | Age: 36
End: 2021-12-06

## 2021-12-07 ENCOUNTER — TELEPHONE (OUTPATIENT)
Dept: URGENT CARE | Facility: CLINIC | Age: 36
End: 2021-12-07

## 2021-12-27 ENCOUNTER — APPOINTMENT (OUTPATIENT)
Dept: LAB | Facility: CLINIC | Age: 36
End: 2021-12-27
Payer: COMMERCIAL

## 2021-12-27 DIAGNOSIS — Z83.49 FAMILY HISTORY OF THYROID DISEASE IN FATHER: ICD-10-CM

## 2021-12-27 DIAGNOSIS — R79.89 ELEVATED TSH: ICD-10-CM

## 2021-12-27 DIAGNOSIS — R03.0 ELEVATED BLOOD PRESSURE READING IN OFFICE WITHOUT DIAGNOSIS OF HYPERTENSION: ICD-10-CM

## 2021-12-27 DIAGNOSIS — Z83.49 FAMILY HISTORY OF THYROID DISEASE IN MOTHER: ICD-10-CM

## 2021-12-27 LAB
T4 FREE SERPL-MCNC: 0.83 NG/DL (ref 0.76–1.46)
TSH SERPL DL<=0.05 MIU/L-ACNC: 5.79 UIU/ML (ref 0.36–3.74)

## 2021-12-27 PROCEDURE — 84443 ASSAY THYROID STIM HORMONE: CPT

## 2021-12-27 PROCEDURE — 84439 ASSAY OF FREE THYROXINE: CPT

## 2021-12-27 PROCEDURE — 36415 COLL VENOUS BLD VENIPUNCTURE: CPT

## 2021-12-29 ENCOUNTER — TELEPHONE (OUTPATIENT)
Dept: FAMILY MEDICINE CLINIC | Facility: CLINIC | Age: 36
End: 2021-12-29

## 2022-01-11 DIAGNOSIS — E03.9 HYPOTHYROIDISM, UNSPECIFIED TYPE: Primary | ICD-10-CM

## 2022-01-11 RX ORDER — LEVOTHYROXINE SODIUM 0.05 MG/1
50 TABLET ORAL DAILY
Qty: 30 TABLET | Refills: 1 | Status: SHIPPED | OUTPATIENT
Start: 2022-01-11 | End: 2022-03-01

## 2022-01-20 ENCOUNTER — TELEMEDICINE (OUTPATIENT)
Dept: FAMILY MEDICINE CLINIC | Facility: CLINIC | Age: 37
End: 2022-01-20
Payer: COMMERCIAL

## 2022-01-20 DIAGNOSIS — B34.9 VIRAL INFECTION, UNSPECIFIED: Primary | ICD-10-CM

## 2022-01-20 DIAGNOSIS — R50.9 FEVER, UNSPECIFIED FEVER CAUSE: ICD-10-CM

## 2022-01-20 DIAGNOSIS — Z86.16 PERSONAL HISTORY OF COVID-19: ICD-10-CM

## 2022-01-20 DIAGNOSIS — J02.9 SORE THROAT: ICD-10-CM

## 2022-01-20 LAB
FLUAV RNA RESP QL NAA+PROBE: NEGATIVE
FLUBV RNA RESP QL NAA+PROBE: NEGATIVE
S PYO AG THROAT QL: NEGATIVE
SARS-COV-2 RNA RESP QL NAA+PROBE: POSITIVE

## 2022-01-20 PROCEDURE — 1036F TOBACCO NON-USER: CPT | Performed by: FAMILY MEDICINE

## 2022-01-20 PROCEDURE — 87636 SARSCOV2 & INF A&B AMP PRB: CPT | Performed by: FAMILY MEDICINE

## 2022-01-20 PROCEDURE — 99213 OFFICE O/P EST LOW 20 MIN: CPT | Performed by: FAMILY MEDICINE

## 2022-01-20 NOTE — PROGRESS NOTES
COVID-19 Outpatient Progress Note    Assessment/Plan:  Molly Uribe was seen today for covid-19  Diagnoses and all orders for this visit:    Viral infection, unspecified  -     Covid/Flu- Office Collect    Fever, unspecified fever cause    Sore throat  -     POCT rapid strepA    Personal history of COVID-19  Comments:  fully recovered        Problem List Items Addressed This Visit        Other    Personal history of COVID-19      Other Visit Diagnoses     Viral infection, unspecified    -  Primary    Relevant Orders    Covid/Flu- Office Collect    Fever, unspecified fever cause        Sore throat        Relevant Orders    POCT rapid strepA         Disposition:     Recommended patient to come to the office to test for COVID-19/Influenza  I advised pt to leave work immediately and that she should not return to work until she has been fever-free for at least 24 hours without having taken any fever-reducing meds    I have spent 10 minutes directly with the patient  Encounter provider Nav Pham DO    Provider located at [address] [place] [address],   [address] [facility] [place] [id number]    Recent Visits  No visits were found meeting these conditions  Showing recent visits within past 7 days and meeting all other requirements  Today's Visits  Date Type Provider Dept   01/20/22 Telemedicine Nav Pham, [address] [place] [address] today's visits and meeting all other requirements  Future Appointments  No visits were found meeting these conditions  Showing future appointments within next 150 days and meeting all other requirements     This virtual check-in was done via telephone and she agrees to proceed  Patient agrees to participate in a virtual check in via telephone or video visit instead of presenting to the office to address urgent/immediate medical needs  Patient is aware this is a billable service      After connecting through Telephone, the patient was identified by name and date of birth  Deven Sanders was informed that this was a telemedicine visit and that the exam was being conducted confidentially over secure lines  My office door was closed  No one else was in the room  Deven Sanders acknowledged consent and understanding of privacy and security of the telemedicine visit  I informed the patient that I have reviewed her record in Epic and presented the opportunity for her to ask any questions regarding the visit today  The patient agreed to participate  It was my intent to perform this visit via video technology but the patient was not able to do a video connection so the visit was completed via audio telephone only  Verification of patient location:  Patient is located in the following state in which I hold an active license: [place]    Subjective: Deven Sanders is a 31 y o  female who is concerned about COVID-19  Patient's symptoms include fever, nasal congestion, rhinorrhea, sore throat and cough  Patient denies shortness of breath       - Date of symptom onset: 1/8/2022      COVID-19 vaccination status: Not vaccinated    Exposure:   Contact with a person who is under investigation (PUI) for or who is positive for COVID-19 within the last 14 days?: No    Hospitalized recently for fever and/or lower respiratory symptoms?: No      Currently a healthcare worker that is involved in direct patient care?: No      Works in a special setting where the risk of COVID-19 transmission may be high? (this may include long-term care, correctional and longterm facilities; homeless shelters; assisted-living facilities and group homes ): No      Resident in a special setting where the risk of COVID-19 transmission may be high? (this may include long-term care, correctional and longterm facilities; homeless shelters; assisted-living facilities and group homes ): No       Sx onset last Saturday evening (01/08)- "started as stuffy nose and ears clogged, now feels like lower part of my throat is on fire and my nose is all congested," admits last night fever 101 3  States, "I dont have fever this morning"- states she took med for the fever  Pt had COVID last month- fully recovered  Pt had told our staff that she is at work currently/right now and she is there at work when [employer] visit connection (finally) made by phone  She did not receive flu vaccine either      Lab Results   Component Value Date    SARSCOV2 Positive (A) 2021     Past Medical History:   Diagnosis Date    GERD (gastroesophageal reflux disease)     MRSA infection     bladder/ urine     Past Surgical History:   Procedure Laterality Date     SECTION      EYE SURGERY      strabismus    TOOTH EXTRACTION       Current Outpatient Medications   Medication Sig Dispense Refill    levonorgestrel (MIRENA) 20 MCG/24HR IUD 1 each by Intrauterine route once      levothyroxine (Synthroid) 50 mcg tablet Take 1 tablet (50 mcg total) by mouth daily 30 tablet 1    omeprazole (PriLOSEC) 40 MG capsule Take 1 capsule (40 mg total) by mouth 2 (two) times a day 90 capsule 3     No current facility-administered medications for this visit  Allergies   Allergen Reactions    Blackberry Flavor - Food Allergy Hives    Shellfish-Derived Products - Food Allergy     Tetanus Toxoid Hives and Edema       Review of Systems   Constitutional: Positive for fever  HENT: Positive for congestion, rhinorrhea and sore throat  Respiratory: Positive for cough  Negative for shortness of breath  Objective: There were no vitals filed for this visit  Physical Exam  Constitutional:       General: She is awake  She is not in acute distress  Appearance: She is not ill-appearing, toxic-appearing or diaphoretic  HENT:      Head: Normocephalic and atraumatic        Nose: Nose normal       Mouth/Throat:      Mouth: Mucous membranes are moist    Eyes:      Conjunctiva/sclera: Conjunctivae normal    Pulmonary: Effort: Pulmonary effort is normal  No respiratory distress  Skin:     Coloration: Skin is not pale  Neurological:      Mental Status: She is alert and oriented to person, place, and time  Psychiatric:         Mood and Affect: Mood normal          VIRTUAL VISIT DISCLAIMER    Juan Dominguez verbally agrees to participate in [facility]  Pt is aware that [facility] could be limited without vital signs or the ability to perform a full hands-on physical [facility] understands she or the provider may request at any time to terminate the video visit and request the patient to seek care or treatment in person

## 2022-01-21 ENCOUNTER — TELEPHONE (OUTPATIENT)
Dept: FAMILY MEDICINE CLINIC | Facility: CLINIC | Age: 37
End: 2022-01-21

## 2022-01-21 NOTE — TELEPHONE ENCOUNTER
----- Message from Naga Leon sent at 1/20/2022  7:20 PM EST -----  Regarding: Thyroid/ strep test  Hello I got my strep test  came back negative  it looks but I have a question  I just started  7 days on the thyroid meds  would that causes to get a sore throat

## 2022-01-21 NOTE — RESULT ENCOUNTER NOTE
Please call Molly Bad and let her know that her COVID-19 swab was positive  Continue symptomatic treatment  Advise she implement home isolation measures including      Staying home  Stay in a specific "sick room" or area and away from other people or animals, including pets  Wear a mask when leaving your room  Use a separate bathroom, if available  Wipe down all commonly touched surfaces with household   Please schedule follow up video visit next week

## 2022-02-22 DIAGNOSIS — K21.9 GASTROESOPHAGEAL REFLUX DISEASE, UNSPECIFIED WHETHER ESOPHAGITIS PRESENT: ICD-10-CM

## 2022-02-22 RX ORDER — OMEPRAZOLE 40 MG/1
40 CAPSULE, DELAYED RELEASE ORAL DAILY
Qty: 90 CAPSULE | Refills: 3 | Status: SHIPPED | OUTPATIENT
Start: 2022-02-22 | End: 2022-03-05 | Stop reason: SDUPTHER

## 2022-02-28 ENCOUNTER — TELEPHONE (OUTPATIENT)
Dept: GASTROENTEROLOGY | Facility: CLINIC | Age: 37
End: 2022-02-28

## 2022-02-28 NOTE — TELEPHONE ENCOUNTER
----- Message from Milana Ocampo MD sent at 2/22/2022  4:55 PM EST -----  Hello, please schedule her EGD and colonoscopy  Thank you   -VT

## 2022-03-01 DIAGNOSIS — E03.9 HYPOTHYROIDISM, UNSPECIFIED TYPE: ICD-10-CM

## 2022-03-01 RX ORDER — LEVOTHYROXINE SODIUM 0.05 MG/1
TABLET ORAL
Qty: 30 TABLET | Refills: 1 | Status: SHIPPED | OUTPATIENT
Start: 2022-03-01 | End: 2022-05-02

## 2022-05-02 DIAGNOSIS — E03.9 HYPOTHYROIDISM, UNSPECIFIED TYPE: ICD-10-CM

## 2022-05-02 RX ORDER — LEVOTHYROXINE SODIUM 0.05 MG/1
TABLET ORAL
Qty: 30 TABLET | Refills: 1 | Status: SHIPPED | OUTPATIENT
Start: 2022-05-02 | End: 2022-07-07 | Stop reason: SDUPTHER

## 2022-06-11 DIAGNOSIS — K21.9 GASTROESOPHAGEAL REFLUX DISEASE, UNSPECIFIED WHETHER ESOPHAGITIS PRESENT: ICD-10-CM

## 2022-06-11 RX ORDER — OMEPRAZOLE 40 MG/1
CAPSULE, DELAYED RELEASE ORAL
Qty: 90 CAPSULE | Refills: 0 | Status: SHIPPED | OUTPATIENT
Start: 2022-06-11 | End: 2022-07-07 | Stop reason: SDUPTHER

## 2022-07-07 DIAGNOSIS — E03.9 HYPOTHYROIDISM, UNSPECIFIED TYPE: ICD-10-CM

## 2022-07-07 DIAGNOSIS — K21.9 GASTROESOPHAGEAL REFLUX DISEASE, UNSPECIFIED WHETHER ESOPHAGITIS PRESENT: ICD-10-CM

## 2022-07-07 RX ORDER — LEVOTHYROXINE SODIUM 0.05 MG/1
50 TABLET ORAL DAILY
Qty: 30 TABLET | Refills: 2 | Status: SHIPPED | OUTPATIENT
Start: 2022-07-07 | End: 2022-09-01 | Stop reason: SDUPTHER

## 2022-07-07 RX ORDER — OMEPRAZOLE 40 MG/1
40 CAPSULE, DELAYED RELEASE ORAL DAILY
Qty: 90 CAPSULE | Refills: 0 | Status: SHIPPED | OUTPATIENT
Start: 2022-07-07 | End: 2022-08-04 | Stop reason: SDUPTHER

## 2022-07-13 ENCOUNTER — TELEPHONE (OUTPATIENT)
Dept: FAMILY MEDICINE CLINIC | Facility: CLINIC | Age: 37
End: 2022-07-13

## 2022-07-13 ENCOUNTER — TELEPHONE (OUTPATIENT)
Dept: ADMINISTRATIVE | Facility: OTHER | Age: 37
End: 2022-07-13

## 2022-07-13 NOTE — TELEPHONE ENCOUNTER
Upon review of the In Basket request we were able to locate, review, and update the patient chart as requested for Pap Smear (HPV) aka Cervical Cancer Screening  Any additional questions or concerns should be emailed to the Practice Liaisons via [email or web address] email, please do not reply via In Basket      Thank you  Shannon Montalvo

## 2022-07-13 NOTE — TELEPHONE ENCOUNTER
----- Message from Shivani Rice sent at 7/13/2022  9:12 AM EDT -----  Regarding: Question regarding SL AMB POCT RAPID STREPA  I meant since January

## 2022-07-13 NOTE — TELEPHONE ENCOUNTER
----- Message from Lorna Yung sent at 7/12/2022  4:10 PM EDT -----  Regarding: Cervical Cancer Screen  07/12/22 4:11 PM    Hello, our patient Kirstie Mcconnell has had Pap Smear (HPV) aka Cervical Cancer Screening completed/performed  Please assist in updating the patient chart by pulling the Care Everywhere (CE) document  The date of service is 01/22/20       Thank you,  Lorna [facility]

## 2022-07-13 NOTE — TELEPHONE ENCOUNTER
----- Message from Adrián Gibbons sent at 7/13/2022  9:11 AM EDT -----  Regarding: Question regarding SL AMB POCT RAPID STREPA  Why did I get a test result when this was taken over a year ago

## 2022-07-15 ENCOUNTER — OFFICE VISIT (OUTPATIENT)
Dept: FAMILY MEDICINE CLINIC | Facility: CLINIC | Age: 37
End: 2022-07-15
Payer: COMMERCIAL

## 2022-07-15 VITALS
WEIGHT: 293 LBS | DIASTOLIC BLOOD PRESSURE: 78 MMHG | TEMPERATURE: 97.9 F | HEART RATE: 88 BPM | SYSTOLIC BLOOD PRESSURE: 128 MMHG | OXYGEN SATURATION: 97 % | HEIGHT: 68 IN | BODY MASS INDEX: 44.41 KG/M2

## 2022-07-15 DIAGNOSIS — Z86.19 HISTORY OF VIRAL ILLNESS: ICD-10-CM

## 2022-07-15 DIAGNOSIS — R22.1 MASS OF RIGHT SIDE OF NECK: ICD-10-CM

## 2022-07-15 DIAGNOSIS — Z87.898 HX OF WHEEZING: ICD-10-CM

## 2022-07-15 DIAGNOSIS — E03.9 HYPOTHYROIDISM, UNSPECIFIED TYPE: Primary | ICD-10-CM

## 2022-07-15 DIAGNOSIS — R06.2 WHEEZING: ICD-10-CM

## 2022-07-15 DIAGNOSIS — U09.9 POST-ACUTE SEQUELAE OF COVID-19 (PASC): ICD-10-CM

## 2022-07-15 DIAGNOSIS — Z86.16 PERSONAL HISTORY OF COVID-19: ICD-10-CM

## 2022-07-15 PROCEDURE — 99214 OFFICE O/P EST MOD 30 MIN: CPT | Performed by: FAMILY MEDICINE

## 2022-07-15 NOTE — PROGRESS NOTES
Assessment/Plan:       Diagnoses and all orders for this visit:    Hypothyroidism, unspecified type  -     TSH, 3rd generation; Future  -     T4, free; Future  -     Thyroglobulin; Future  -     Anti-thyroglobulin antibody; Future  -     US thyroid; Future  -     US head neck soft tissue; Future    Mass of right side of neck  -     Thyroglobulin; Future  -     Anti-thyroglobulin antibody; Future  -     US thyroid; Future  -     US head neck soft tissue; Future    Personal history of COVID-19  Comments:  12/03/2021 - has lingering wheezing PASC  Orders:  -     Complete PFT with post bronchodilators; Future  -     XR chest pa & lateral; Future    Post-acute sequelae of COVID-19 (PASC)  -     Complete PFT with post bronchodilators; Future  -     XR chest pa & lateral; Future    Wheezing  -     Complete PFT with post bronchodilators; Future  -     XR chest pa & lateral; Future    History of viral illness  Comments:  01/01/2020 - [facility], [facility], and chest congestion and had subsequent prolonged wheezing for about 4 months- thinks might have been    Hx of wheezing          Subjective:   Chief Complaint   Patient presents with    Follow-up     6 month        Patient ID: Henrique Murcia is a 30 y o  female      Was started on synthroid in January and since then has notice her hair has been coming back- much better  C/o new problem - "past week and a half feeling a weird bulge or like it's more muscular" on the right side of neck, near her thyroid   No ST, tenderness or dysphagia/odynophigia  Still have a wheeze since the last time I had COVID- in December it was real bad, now have a wheeze still      The following portions of the patient's history were reviewed and updated as appropriate: allergies, current medications, past family history, past medical history, past social history, past surgical history and problem list     Review of Systems      Objective:      /78 (BP Location: Left arm, Patient Position: Sitting, Cuff Size: Standard)   Pulse 88   Temp 97 9 °F (36 6 °C) (Tympanic)   Ht 5' 8" (1 727 m)   Wt (!) 151 kg (332 lb 9 6 oz)   SpO2 97%   BMI 50 57 kg/m²          Physical Exam  Vitals and nursing note reviewed  Constitutional:       General: She is not in acute distress  Appearance: She is well-developed  She is not ill-appearing, toxic-appearing or diaphoretic  Comments: Extremely pleasant   HENT:      Head: Normocephalic and atraumatic  Right Ear: Tympanic membrane, ear canal and external ear normal       Left Ear: Tympanic membrane, ear canal and external ear normal       Nose: Nose normal       Mouth/Throat:      Lips: Pink  Mouth: Mucous membranes are moist       Pharynx: Oropharynx is clear  Uvula midline  Eyes:      General: Lids are normal       Conjunctiva/sclera: Conjunctivae normal       Pupils: Pupils are equal, round, and reactive to light  Neck:      Thyroid: No thyroid tenderness  Vascular: No JVD  Trachea: Trachea and phonation normal       Comments: Right lower anterior neck mass - difficult to delineate whether is right thyroid enlargement, mass, or if within neck due to pt's adiposity, thyroid is otherwise normal  Cardiovascular:      Rate and Rhythm: Normal rate and regular rhythm  Pulses: Normal pulses  Heart sounds: Normal heart sounds  Comments: No edema  Pulmonary:      Effort: Pulmonary effort is normal       Breath sounds: Normal breath sounds and air entry  Abdominal:      Palpations: Abdomen is soft  Tenderness: There is no abdominal tenderness  Musculoskeletal:      Cervical back: Normal range of motion and neck supple  Lymphadenopathy:      Cervical: No cervical adenopathy  Skin:     General: Skin is warm and dry  Coloration: Skin is not pale  Neurological:      Mental Status: She is alert and oriented to person, place, and time        Gait: Gait normal    Psychiatric:         Mood and Affect: Mood normal          Behavior: Behavior normal  Behavior is cooperative

## 2022-07-17 PROBLEM — U09.9 POST-ACUTE SEQUELAE OF COVID-19 (PASC): Status: ACTIVE | Noted: 2022-07-17

## 2022-07-17 PROBLEM — R22.1 MASS OF RIGHT SIDE OF NECK: Status: ACTIVE | Noted: 2022-07-17

## 2022-07-17 PROBLEM — Z87.898 HX OF WHEEZING: Status: ACTIVE | Noted: 2022-07-17

## 2022-07-17 PROBLEM — R06.2 WHEEZING: Status: ACTIVE | Noted: 2022-07-17

## 2022-07-23 ENCOUNTER — HOSPITAL ENCOUNTER (OUTPATIENT)
Dept: ULTRASOUND IMAGING | Facility: HOSPITAL | Age: 37
Discharge: HOME | End: 2022-07-23
Payer: COMMERCIAL

## 2022-07-23 DIAGNOSIS — R22.1 MASS OF RIGHT SIDE OF NECK: ICD-10-CM

## 2022-07-23 DIAGNOSIS — E03.9 HYPOTHYROIDISM, UNSPECIFIED TYPE: ICD-10-CM

## 2022-07-23 PROCEDURE — 76536 US EXAM OF HEAD AND NECK: CPT

## 2022-07-28 ENCOUNTER — TELEPHONE (OUTPATIENT)
Dept: FAMILY MEDICINE CLINIC | Facility: CLINIC | Age: 37
End: 2022-07-28

## 2022-07-28 NOTE — TELEPHONE ENCOUNTER
[facility]  Report- Ultrasound Thyroid- 7/23/2022 - "Significant findings" Report in Epic   Please  Let know"

## 2022-07-29 NOTE — TELEPHONE ENCOUNTER
Please call patient let her know thyroid ultrasound showed mildly enlarged right lateral neck lymph nodes This is normal sometimes and could be caused from recent  illness  No nodules were found on ultrasound  There are no acute concerns  Thyroid ultrasound should be repeated in 3 months to ensure resolution which can be done by PCP when she returns to the office

## 2022-08-02 NOTE — TELEPHONE ENCOUNTER
Patient returned phone call  Informed patient of results and providers recommendation  Patient understood and had no questions

## 2022-08-04 DIAGNOSIS — R59.0 LYMPHADENOPATHY OF RIGHT CERVICAL REGION: Primary | ICD-10-CM

## 2022-08-06 ENCOUNTER — APPOINTMENT (OUTPATIENT)
Dept: LAB | Facility: CLINIC | Age: 37
End: 2022-08-06
Payer: COMMERCIAL

## 2022-08-06 DIAGNOSIS — R22.1 MASS OF RIGHT SIDE OF NECK: ICD-10-CM

## 2022-08-06 DIAGNOSIS — E03.9 HYPOTHYROIDISM, UNSPECIFIED TYPE: ICD-10-CM

## 2022-08-06 LAB
T4 FREE SERPL-MCNC: 1.02 NG/DL (ref 0.76–1.46)
TSH SERPL DL<=0.05 MIU/L-ACNC: 3.07 UIU/ML (ref 0.45–4.5)

## 2022-08-06 PROCEDURE — 86800 THYROGLOBULIN ANTIBODY: CPT

## 2022-08-06 PROCEDURE — 36415 COLL VENOUS BLD VENIPUNCTURE: CPT

## 2022-08-06 PROCEDURE — 84443 ASSAY THYROID STIM HORMONE: CPT

## 2022-08-06 PROCEDURE — 84439 ASSAY OF FREE THYROXINE: CPT

## 2022-08-06 PROCEDURE — 84432 ASSAY OF THYROGLOBULIN: CPT

## 2022-08-09 LAB
THYROGLOB AB SERPL-ACNC: <1 IU/ML (ref 0–0.9)
THYROGLOB SERPL-MCNC: 1.2 NG/ML (ref 1.5–38.5)

## 2022-10-04 ENCOUNTER — OFFICE VISIT (OUTPATIENT)
Dept: URGENT CARE | Facility: CLINIC | Age: 37
End: 2022-10-04
Payer: COMMERCIAL

## 2022-10-04 VITALS
WEIGHT: 293 LBS | TEMPERATURE: 97 F | BODY MASS INDEX: 44.41 KG/M2 | HEIGHT: 68 IN | OXYGEN SATURATION: 98 % | RESPIRATION RATE: 18 BRPM | HEART RATE: 91 BPM

## 2022-10-04 DIAGNOSIS — J20.9 ACUTE BRONCHITIS, UNSPECIFIED ORGANISM: Primary | ICD-10-CM

## 2022-10-04 DIAGNOSIS — H74.8X2 HEMOTYMPANUM, LEFT: ICD-10-CM

## 2022-10-04 PROCEDURE — 99213 OFFICE O/P EST LOW 20 MIN: CPT | Performed by: PHYSICIAN ASSISTANT

## 2022-10-04 RX ORDER — PREDNISONE 20 MG/1
TABLET ORAL
Qty: 5 TABLET | Refills: 0 | Status: SHIPPED | OUTPATIENT
Start: 2022-10-04

## 2022-10-04 RX ORDER — AZITHROMYCIN 250 MG/1
TABLET, FILM COATED ORAL
Qty: 6 TABLET | Refills: 0 | Status: SHIPPED | OUTPATIENT
Start: 2022-10-04 | End: 2022-10-08

## 2022-10-04 RX ORDER — BENZONATATE 200 MG/1
200 CAPSULE ORAL 3 TIMES DAILY PRN
Qty: 20 CAPSULE | Refills: 0 | Status: SHIPPED | OUTPATIENT
Start: 2022-10-04

## 2022-10-04 NOTE — PATIENT INSTRUCTIONS
I have prescribed an antibiotic for the infection  Please take the antibiotic as prescribed and finish the entire prescription  I recommend that the patient takes an over the counter probiotic or eats yogurt with live cultures in it [place]) to keep good bacteria in the gut and help prevent diarrhea  Wash hands frequently to prevent the spread of infection  Can use over the counter cough and cold medications to help with symptoms  Ibuprofen and/or tylenol as needed for pain or fever  If not improving over the next 7-10 days, follow up with PCP  Follow up with ENT for ear injury

## 2022-10-04 NOTE — PROGRESS NOTES
[facility]    NAME: Bairon Baldwin is a 30 y o  female  : 1985    MRN: [id number]  DATE: 2022  TIME: 1:54 PM    Assessment and Plan   Acute bronchitis, unspecified organism [J20 9]  1  Acute bronchitis, unspecified organism  azithromycin (ZITHROMAX) 250 mg tablet    predniSONE 20 mg tablet    benzonatate (TESSALON) 200 MG capsule   2  Hemotympanum, left  Ambulatory Referral to Otolaryngology       Patient Instructions     Patient Instructions   I have prescribed an antibiotic for the infection  Please take the antibiotic as prescribed and finish the entire prescription  I recommend that the patient takes an over the counter probiotic or eats yogurt with live cultures in it [place]) to keep good bacteria in the gut and help prevent diarrhea  Wash hands frequently to prevent the spread of infection  Can use over the counter cough and cold medications to help with symptoms  Ibuprofen and/or tylenol as needed for pain or fever  If not improving over the next 7-10 days, follow up with PCP  Follow up with ENT for ear injury  Chief Complaint     Chief Complaint   Patient presents with    Cough     Did 2 home  covid tests in last weeks        History of Present Illness   53-year-old female here with complaint of upper respiratory infection, cough which is settling in her chest   Has been coughing for weeks  Took at home COVID test which were all negative  Cough is productive with thick sputum  States that she was cleaning her left ear with a Q-tip and ended up pushing it into far  Ear is bleeding and sounds are muffled  Review of Systems   Review of Systems   Constitutional: Negative for appetite change, chills and fever  HENT: Positive for congestion, ear pain and hearing loss  Negative for ear discharge, facial swelling, postnasal drip, sinus pressure, sneezing and sore throat  Respiratory: Positive for cough and chest tightness  Negative for shortness of breath  Neurological: Negative for headaches         Current Medications     Current Outpatient Medications:     azithromycin (ZITHROMAX) 250 mg tablet, Take 2 tablets today then 1 tablet daily x 4 days, Disp: 6 tablet, Rfl: 0    benzonatate (TESSALON) 200 MG capsule, Take 1 capsule (200 mg total) by mouth 3 (three) times a day as needed for cough, Disp: 20 capsule, Rfl: 0    predniSONE 20 mg tablet, Take 1 tablet daily for 5 days, Disp: 5 tablet, Rfl: 0    levonorgestrel (MIRENA) 20 MCG/24HR IUD, 1 each by Intrauterine route once, Disp: , Rfl:     levothyroxine 50 mcg tablet, Take 1 tablet (50 mcg total) by mouth daily, Disp: 30 tablet, Rfl: 5    omeprazole (PriLOSEC) 40 MG capsule, Take 1 capsule (40 mg total) by mouth daily, Disp: 90 capsule, Rfl: 0    Current Allergies     Allergies as of 10/04/2022 - Reviewed 10/04/2022   Allergen Reaction Noted    Blackberry flavor - food allergy Hives 2021    Shellfish-derived products - food allergy  2019    Tetanus toxoid Hives and Edema 2016          The following portions of the patient's history were reviewed and updated as appropriate: allergies, current medications, past family history, past medical history, past social history, past surgical history and problem list    Past Medical History:   Diagnosis Date    GERD (gastroesophageal reflux disease)     MRSA infection     bladder/ urine     Past Surgical History:   Procedure Laterality Date     SECTION      EYE SURGERY      strabismus    TOOTH EXTRACTION       Family History   Problem Relation Age of Onset    COPD Mother     Colon polyps Mother     Diabetes Father     Colon polyps Father     No Known Problems Sister     No Known Problems Brother     No Known Problems Son     No Known Problems Daughter     No Known Problems Brother     No Known Problems Brother     No Known Problems Brother     No Known Problems Sister     Gallbladder disease Sister      Social History Socioeconomic History    Marital status: /Civil Union     Spouse name: Not on file    Number of children: Not on file    Years of education: Not on file    Highest education level: Not on file   Occupational History    Not on file   Tobacco Use    Smoking status: Never Smoker    Smokeless tobacco: Never Used   Vaping Use    Vaping Use: Never used   Substance and Sexual Activity    Alcohol use: Not Currently     Alcohol/week: 0 0 standard drinks     Comment: rare    Drug use: Never    Sexual activity: Yes     Partners: Male     Birth control/protection: I U D  Other Topics Concern    Not on file   Social History Narrative    , 3 children    Works at [address] Strain: Not on [employer] Foods Insecurity: Not on file   Transportation Needs: Not on file   Physical Activity: Not on file   Stress: Not on file   Social Connections: Not on file   Intimate Partner Violence: Not on file   Housing Stability: Not on file     Medications have been verified  Objective   Pulse 91   Temp (!) 97 °F (36 1 °C)   Resp 18   Ht 5' 8" (1 727 m)   Wt (!) 163 kg (360 lb)   SpO2 98%   BMI 54 74 kg/m²      Physical Exam   Physical Exam  Vitals and nursing note reviewed  Constitutional:       General: She is not in acute distress  Appearance: She is well-developed  HENT:      Head: Normocephalic and atraumatic  Right Ear: Tympanic membrane normal       Left Ear: Tympanic membrane normal       Ears:      Comments: Hemotympanum noted     Nose: No mucosal edema, congestion or rhinorrhea  Right Sinus: No maxillary sinus tenderness or frontal sinus tenderness  Left Sinus: No maxillary sinus tenderness or frontal sinus tenderness  Mouth/Throat:      Pharynx: Posterior oropharyngeal erythema present  No oropharyngeal exudate     Eyes:      Conjunctiva/sclera: Conjunctivae normal    Cardiovascular:      Rate and Rhythm: Normal rate and regular rhythm  Heart sounds: Normal heart sounds  No murmur heard  Pulmonary:      Effort: Pulmonary effort is normal  No respiratory distress  Breath sounds: Normal breath sounds

## 2022-10-06 ENCOUNTER — TELEPHONE (OUTPATIENT)
Dept: FAMILY MEDICINE CLINIC | Facility: CLINIC | Age: 37
End: 2022-10-06

## 2022-10-06 NOTE — TELEPHONE ENCOUNTER
Left message for pt to call the office fmla papers were faxed asking if she wanted the orginial forms back for her records

## 2022-10-14 ENCOUNTER — HOSPITAL ENCOUNTER (OUTPATIENT)
Dept: PULMONOLOGY | Facility: HOSPITAL | Age: 37
End: 2022-10-14
Payer: COMMERCIAL

## 2022-10-14 DIAGNOSIS — R06.2 WHEEZING: ICD-10-CM

## 2022-10-14 DIAGNOSIS — Z86.16 PERSONAL HISTORY OF COVID-19: ICD-10-CM

## 2022-10-14 DIAGNOSIS — U09.9 POST-ACUTE SEQUELAE OF COVID-19 (PASC): ICD-10-CM

## 2022-10-14 PROCEDURE — 94060 EVALUATION OF WHEEZING: CPT | Performed by: INTERNAL MEDICINE

## 2022-10-14 PROCEDURE — 94060 EVALUATION OF WHEEZING: CPT

## 2022-10-14 PROCEDURE — 94726 PLETHYSMOGRAPHY LUNG VOLUMES: CPT | Performed by: INTERNAL MEDICINE

## 2022-10-14 PROCEDURE — 94729 DIFFUSING CAPACITY: CPT

## 2022-10-14 PROCEDURE — 94726 PLETHYSMOGRAPHY LUNG VOLUMES: CPT

## 2022-10-14 PROCEDURE — 94729 DIFFUSING CAPACITY: CPT | Performed by: INTERNAL MEDICINE

## 2022-10-14 PROCEDURE — 94760 N-INVAS EAR/PLS OXIMETRY 1: CPT

## 2022-10-14 RX ORDER — ALBUTEROL SULFATE 2.5 MG/3ML
2.5 SOLUTION RESPIRATORY (INHALATION) ONCE AS NEEDED
Status: COMPLETED | OUTPATIENT
Start: 2022-10-14 | End: 2022-10-14

## 2022-10-14 RX ADMIN — ALBUTEROL SULFATE 2.5 MG: 2.5 SOLUTION RESPIRATORY (INHALATION) at 14:18

## 2022-12-29 DIAGNOSIS — K21.9 GASTROESOPHAGEAL REFLUX DISEASE, UNSPECIFIED WHETHER ESOPHAGITIS PRESENT: ICD-10-CM

## 2022-12-29 DIAGNOSIS — E03.9 HYPOTHYROIDISM, UNSPECIFIED TYPE: ICD-10-CM

## 2022-12-29 RX ORDER — LEVOTHYROXINE SODIUM 0.05 MG/1
50 TABLET ORAL DAILY
Qty: 30 TABLET | Refills: 0 | Status: SHIPPED | OUTPATIENT
Start: 2022-12-29

## 2022-12-29 RX ORDER — OMEPRAZOLE 40 MG/1
CAPSULE, DELAYED RELEASE ORAL
Qty: 90 CAPSULE | Refills: 0 | Status: SHIPPED | OUTPATIENT
Start: 2022-12-29 | End: 2022-12-29 | Stop reason: SDUPTHER

## 2023-01-23 DIAGNOSIS — E03.9 HYPOTHYROIDISM, UNSPECIFIED TYPE: ICD-10-CM

## 2023-01-23 RX ORDER — LEVOTHYROXINE SODIUM 0.05 MG/1
TABLET ORAL
Qty: 30 TABLET | Refills: 0 | Status: SHIPPED | OUTPATIENT
Start: 2023-01-23

## 2023-02-21 DIAGNOSIS — E03.9 HYPOTHYROIDISM, UNSPECIFIED TYPE: ICD-10-CM

## 2023-02-21 RX ORDER — LEVOTHYROXINE SODIUM 0.05 MG/1
TABLET ORAL
Qty: 30 TABLET | Refills: 0 | Status: SHIPPED | OUTPATIENT
Start: 2023-02-21

## 2023-03-21 DIAGNOSIS — K21.9 GASTROESOPHAGEAL REFLUX DISEASE, UNSPECIFIED WHETHER ESOPHAGITIS PRESENT: ICD-10-CM

## 2023-03-21 DIAGNOSIS — E03.9 HYPOTHYROIDISM, UNSPECIFIED TYPE: ICD-10-CM

## 2023-03-21 RX ORDER — OMEPRAZOLE 40 MG/1
CAPSULE, DELAYED RELEASE ORAL
Qty: 90 CAPSULE | Refills: 0 | Status: SHIPPED | OUTPATIENT
Start: 2023-03-21

## 2023-03-21 RX ORDER — LEVOTHYROXINE SODIUM 0.05 MG/1
TABLET ORAL
Qty: 30 TABLET | Refills: 0 | Status: SHIPPED | OUTPATIENT
Start: 2023-03-21

## 2023-04-23 DIAGNOSIS — E03.9 HYPOTHYROIDISM, UNSPECIFIED TYPE: ICD-10-CM

## 2023-04-24 RX ORDER — LEVOTHYROXINE SODIUM 0.05 MG/1
TABLET ORAL
Qty: 30 TABLET | Refills: 0 | Status: SHIPPED | OUTPATIENT
Start: 2023-04-24

## 2023-05-23 DIAGNOSIS — E03.9 HYPOTHYROIDISM, UNSPECIFIED TYPE: ICD-10-CM

## 2023-05-23 RX ORDER — LEVOTHYROXINE SODIUM 0.05 MG/1
TABLET ORAL
Qty: 30 TABLET | Refills: 0 | Status: SHIPPED | OUTPATIENT
Start: 2023-05-23

## 2023-06-23 DIAGNOSIS — E03.9 HYPOTHYROIDISM, UNSPECIFIED TYPE: ICD-10-CM

## 2023-06-23 DIAGNOSIS — K21.9 GASTROESOPHAGEAL REFLUX DISEASE, UNSPECIFIED WHETHER ESOPHAGITIS PRESENT: ICD-10-CM

## 2023-06-23 RX ORDER — LEVOTHYROXINE SODIUM 0.05 MG/1
TABLET ORAL
Qty: 30 TABLET | Refills: 0 | Status: SHIPPED | OUTPATIENT
Start: 2023-06-23

## 2023-06-23 RX ORDER — OMEPRAZOLE 40 MG/1
CAPSULE, DELAYED RELEASE ORAL
Qty: 30 CAPSULE | Refills: 0 | Status: SHIPPED | OUTPATIENT
Start: 2023-06-23 | End: 2023-08-04 | Stop reason: SDUPTHER

## 2023-07-22 DIAGNOSIS — E03.9 HYPOTHYROIDISM, UNSPECIFIED TYPE: ICD-10-CM

## 2023-07-22 DIAGNOSIS — K21.9 GASTROESOPHAGEAL REFLUX DISEASE, UNSPECIFIED WHETHER ESOPHAGITIS PRESENT: ICD-10-CM

## 2023-07-24 RX ORDER — OMEPRAZOLE 40 MG/1
CAPSULE, DELAYED RELEASE ORAL
Qty: 30 CAPSULE | Refills: 0 | OUTPATIENT
Start: 2023-07-24

## 2023-07-24 RX ORDER — LEVOTHYROXINE SODIUM 0.05 MG/1
TABLET ORAL
Qty: 30 TABLET | Refills: 0 | Status: SHIPPED | OUTPATIENT
Start: 2023-07-24

## 2023-07-24 NOTE — TELEPHONE ENCOUNTER
Patient is overdue for follow-up. Last seen in July 2022 by PCP. Please call and have her schedule an appointment. Will refill levothyroxine today but no further refills until follow-up.

## 2023-08-04 ENCOUNTER — OFFICE VISIT (OUTPATIENT)
Age: 38
End: 2023-08-04
Payer: COMMERCIAL

## 2023-08-04 VITALS
HEIGHT: 68 IN | WEIGHT: 293 LBS | RESPIRATION RATE: 18 BRPM | BODY MASS INDEX: 44.41 KG/M2 | OXYGEN SATURATION: 100 % | HEART RATE: 109 BPM

## 2023-08-04 DIAGNOSIS — K52.9 POSTPRANDIAL DIARRHEA: ICD-10-CM

## 2023-08-04 DIAGNOSIS — K21.9 GASTROESOPHAGEAL REFLUX DISEASE, UNSPECIFIED WHETHER ESOPHAGITIS PRESENT: Primary | ICD-10-CM

## 2023-08-04 DIAGNOSIS — R19.5 ELEVATED FECAL CALPROTECTIN: ICD-10-CM

## 2023-08-04 PROBLEM — R11.0 POSTPRANDIAL NAUSEA: Status: RESOLVED | Noted: 2021-09-02 | Resolved: 2023-08-04

## 2023-08-04 PROBLEM — R10.9 ABDOMINAL CRAMPING: Status: RESOLVED | Noted: 2021-09-02 | Resolved: 2023-08-04

## 2023-08-04 PROCEDURE — 99214 OFFICE O/P EST MOD 30 MIN: CPT | Performed by: STUDENT IN AN ORGANIZED HEALTH CARE EDUCATION/TRAINING PROGRAM

## 2023-08-04 RX ORDER — POLYETHYLENE GLYCOL 3350 17 G/17G
238 POWDER, FOR SOLUTION ORAL ONCE
Qty: 238 G | Refills: 0 | Status: SHIPPED | OUTPATIENT
Start: 2023-08-04 | End: 2023-08-04

## 2023-08-04 RX ORDER — BISACODYL 5 MG/1
20 TABLET, DELAYED RELEASE ORAL ONCE
Qty: 4 TABLET | Refills: 0 | Status: SHIPPED | OUTPATIENT
Start: 2023-08-04 | End: 2023-08-04

## 2023-08-04 RX ORDER — OMEPRAZOLE 40 MG/1
40 CAPSULE, DELAYED RELEASE ORAL DAILY
Qty: 90 CAPSULE | Refills: 1 | Status: SHIPPED | OUTPATIENT
Start: 2023-08-04

## 2023-08-04 NOTE — PATIENT INSTRUCTIONS
Scheduled date of EGD/colonoscopy (as of today):08/18/2023  Physician performing EGD/colonoscopy:Yudelka  Location of EGD/colonoscopy:[facility]  Desired bowel prep reviewed with patient:Miralax/Dulcolax  Instructions reviewed with patient by:Susan  Clearances: None

## 2023-08-04 NOTE — PROGRESS NOTES
[facility]  Outpatient Follow-up  Encounter: [id number]    PATIENT INFO     Name: Frieda Haro  YOB: 1985   Age: 41 y.o. Sex: female   MRN: [id number]    Lelo1 Heladio Cano is a 41 y.o. female with complaint of GERD, diarrhea, elevated fecal calprotectin and CRP. Her GERD seems relatively stable but given her longstanding history, would be reasonable to pursue upper endoscopy for Aviles's screening. No upper GI alarm symptoms. She does continue to have diarrhea as well but this appears to have improved slightly since changing her diet and eating out less. However, she does admit to ongoing 4-5 episodes of bowel movements daily. Sometimes with urgency. Denies any hematochezia or melena. Had work-up after her last office visit including celiac panel, thyroid, and fecal calprotectin and CRP. Her CRP and fecal calprotectin were noted to be slightly elevated. She denies any family history of inflammatory bowel disease. Did try lactose restriction but had no improvement in her diarrhea at that time. • Scheduled for upper endoscopy and colonoscopy; upper endoscopy for Aviles's screening but also with plans for gastric and duodenal biopsies in the setting of elevated fecal calprotectin concerning for possible IBD, and colonoscopy for evaluation of diarrhea and elevated inflammatory markers  • We will plan for TI evaluation as well as random colon biopsies  • MiraLAX/Dulcolax bowel prep  • Discussed weight loss as body habitus is certainly contributing to her reflux symptoms  • We will also send refill for omeprazole per patient request    1. Gastroesophageal reflux disease, unspecified whether esophagitis present    2. Postprandial diarrhea    3.  Elevated fecal calprotectin      Orders Placed This Encounter   Procedures   • Colonoscopy   • EGD       FOLLOW-UP: 6 months    HISTORY OF PRESENT ILLNESS       Frieda Haro is a 41 y.o. female who presents to GI office for follow-up of GERD and diarrhea. At last office visit, patient had work-up for diarrhea including celiac panel, thyroid, and stool studies. Her CRP and fecal calprotectin were noted to be elevated. She denies any family history of inflammatory bowel disease. Her diarrhea has improved but she still continues to have 4-5 bowel movements per day. She was previously having more than this. She denies any hematochezia or melena. She denies any unintentional weight loss. She has tried eating out less and cooking more at home which seems to have helped reduce the number of diarrhea episodes. She also previously tried lactose restriction but states that this did not improve her symptoms. She continues to have heartburn which appears to respond well to omeprazole. She is requesting refill for this. Denies any dysphagia or odynophagia.      ENDOSCOPIC HISTORY     UPPER ENDOSCOPY: None  COLONOSCOPY: None    REVIEW OF SYSTEMS     CONSTITUTIONAL: Denies any fever, chills, rigors, and weight loss  HEENT: No earache or tinnitus, denies hearing loss or visual disturbances  CARDIOVASCULAR: No chest pain or palpitations  RESPIRATORY: Denies any cough, hemoptysis, shortness of breath or dyspnea on exertion  GASTROINTESTINAL: As noted in the History of Present Illness  GENITOURINARY: No problems with urination, denies any hematuria or dysuria  NEUROLOGIC: No dizziness or vertigo, denies headaches   MUSCULOSKELETAL: Denies any muscle or joint pain   SKIN: Denies skin rashes or itching  ENDOCRINE: Denies excessive thirst, denies intolerance to heat or cold  PSYCHOSOCIAL: Denies depression or anxiety, denies any recent memory loss     Historical Information   Past Medical History:   Diagnosis Date   • GERD (gastroesophageal reflux disease)    • MRSA infection     bladder/ urine     Past Surgical History:   Procedure Laterality Date   •  SECTION     • EYE SURGERY      strabismus   • TOOTH EXTRACTION       Social History   Social History     Substance and Sexual Activity   Alcohol Use Not Currently   • Alcohol/week: 0.0 standard drinks of alcohol    Comment: rare     Social History     Substance and Sexual Activity   Drug Use Never     Social History     Tobacco Use   Smoking Status Never   Smokeless Tobacco Never     Family History   Problem Relation Age of Onset   • COPD Mother    • Colon polyps Mother    • Diabetes Father    • Colon polyps Father    • No Known Problems Sister    • No Known Problems Brother    • No Known Problems Son    • No Known Problems Daughter    • No Known Problems Brother    • No Known Problems Brother    • No Known Problems Brother    • No Known Problems Sister    • Gallbladder disease Sister        MEDICATIONS & ALLERGIES     Current Outpatient Medications   Medication Instructions   • benzonatate (TESSALON) 200 mg, Oral, 3 times daily PRN   • bisacodyl (DULCOLAX) 20 mg, Oral, Once   • levonorgestrel (MIRENA) 20 MCG/24HR IUD 1 each, Intrauterine, Once   • levothyroxine 50 mcg tablet swallow 1 tablet once daily   • ofloxacin (FLOXIN) 0.3 % otic solution 5 drops, Left Ear, 2 times daily   • omeprazole (PRILOSEC) 40 mg, Oral, Daily   • polyethylene glycol (MIRALAX) 238 g, Oral, Once, Take 238 g my mouth. Use as directed   • predniSONE 20 mg tablet Take 1 tablet daily for 5 days   • predniSONE 20 mg tablet 3 tablets by mouth with food in AM x 3 days, 2 tabs by mouth with food in AM x 3 days then 1 tab with food each morning x 3 days     Allergies   Allergen Reactions   • Blackberry Flavor - Food Allergy Hives   • Shellfish-Derived Products - Food Allergy    • Tetanus Toxoid Hives and Edema       PHYSICAL EXAM      Objective   Pulse (!) 109, resp. rate 18, height 5' 8" (1.727 m), weight (!) 149 kg (329 lb), SpO2 100 %. Body mass index is 50.02 kg/m².     General Appearance:   Alert, cooperative, no distress   HEENT:   Normocephalic, atraumatic, anicteric     Neck:   Supple, symmetrical, trachea midline   Lungs:   Equal chest rise, respirations unlabored    Heart:   Regular rate and rhythm   Abdomen:   Soft, non-tender, non-distended; normal bowel sounds; no masses, no organomegaly    Rectal:   Deferred    Extremities:   No cyanosis, clubbing or edema    Neuro: Moves all 4 extremities    Skin:   No jaundice, rashes, or lesions      LABORATORY RESULTS     No visits with results within 1 Day(s) from this visit. Latest known visit with results is:   Appointment on 08/06/2022   Component Date Value   • TSH 3RD GENERATON 08/06/2022 3.070    • Free T4 08/06/2022 1.02    • Thyroglobulin Ab 08/06/2022 <1.0    • Thyroglobulin-LYNETTE 08/06/2022 1.2 (L)      No results found. RADIOLOGY RESULTS: I have personally reviewed pertinent imaging studies. Thao Vela D.O. [id number] [facility]  Division of Gastroenterology & Hepatology  Available on [email or web address]    ** Please Note: This note is constructed using a voice recognition dictation system.  **

## 2023-08-18 ENCOUNTER — ANESTHESIA (OUTPATIENT)
Dept: GASTROENTEROLOGY | Facility: HOSPITAL | Age: 38
End: 2023-08-18

## 2023-08-18 ENCOUNTER — ANESTHESIA EVENT (OUTPATIENT)
Dept: GASTROENTEROLOGY | Facility: HOSPITAL | Age: 38
End: 2023-08-18

## 2023-08-18 ENCOUNTER — HOSPITAL ENCOUNTER (OUTPATIENT)
Dept: GASTROENTEROLOGY | Facility: HOSPITAL | Age: 38
Setting detail: OUTPATIENT SURGERY
End: 2023-08-18
Attending: STUDENT IN AN ORGANIZED HEALTH CARE EDUCATION/TRAINING PROGRAM
Payer: COMMERCIAL

## 2023-08-18 VITALS
BODY MASS INDEX: 44.41 KG/M2 | HEIGHT: 68 IN | DIASTOLIC BLOOD PRESSURE: 58 MMHG | WEIGHT: 293 LBS | SYSTOLIC BLOOD PRESSURE: 119 MMHG | TEMPERATURE: 97 F | HEART RATE: 100 BPM | RESPIRATION RATE: 18 BRPM | OXYGEN SATURATION: 96 %

## 2023-08-18 DIAGNOSIS — R19.5 ELEVATED FECAL CALPROTECTIN: ICD-10-CM

## 2023-08-18 DIAGNOSIS — K21.9 GASTROESOPHAGEAL REFLUX DISEASE, UNSPECIFIED WHETHER ESOPHAGITIS PRESENT: ICD-10-CM

## 2023-08-18 DIAGNOSIS — K52.9 POSTPRANDIAL DIARRHEA: ICD-10-CM

## 2023-08-18 LAB
EXT PREGNANCY TEST URINE: NEGATIVE
EXT. CONTROL: NORMAL

## 2023-08-18 PROCEDURE — 88305 TISSUE EXAM BY PATHOLOGIST: CPT | Performed by: PATHOLOGY

## 2023-08-18 PROCEDURE — 81025 URINE PREGNANCY TEST: CPT | Performed by: STUDENT IN AN ORGANIZED HEALTH CARE EDUCATION/TRAINING PROGRAM

## 2023-08-18 RX ORDER — SODIUM CHLORIDE, SODIUM LACTATE, POTASSIUM CHLORIDE, CALCIUM CHLORIDE 600; 310; 30; 20 MG/100ML; MG/100ML; MG/100ML; MG/100ML
INJECTION, SOLUTION INTRAVENOUS CONTINUOUS PRN
Status: DISCONTINUED | OUTPATIENT
Start: 2023-08-18 | End: 2023-08-18

## 2023-08-18 RX ORDER — PROPOFOL 10 MG/ML
INJECTION, EMULSION INTRAVENOUS AS NEEDED
Status: DISCONTINUED | OUTPATIENT
Start: 2023-08-18 | End: 2023-08-18

## 2023-08-18 RX ORDER — SODIUM CHLORIDE, SODIUM LACTATE, POTASSIUM CHLORIDE, CALCIUM CHLORIDE 600; 310; 30; 20 MG/100ML; MG/100ML; MG/100ML; MG/100ML
100 INJECTION, SOLUTION INTRAVENOUS CONTINUOUS
Status: DISCONTINUED | OUTPATIENT
Start: 2023-08-18 | End: 2023-08-22 | Stop reason: HOSPADM

## 2023-08-18 RX ORDER — LIDOCAINE HYDROCHLORIDE 20 MG/ML
INJECTION, SOLUTION EPIDURAL; INFILTRATION; INTRACAUDAL; PERINEURAL AS NEEDED
Status: DISCONTINUED | OUTPATIENT
Start: 2023-08-18 | End: 2023-08-18

## 2023-08-18 RX ADMIN — SODIUM CHLORIDE, SODIUM LACTATE, POTASSIUM CHLORIDE, AND CALCIUM CHLORIDE 100 ML/HR: .6; .31; .03; .02 INJECTION, SOLUTION INTRAVENOUS at 09:31

## 2023-08-18 RX ADMIN — PROPOFOL 200 MG: 10 INJECTION, EMULSION INTRAVENOUS at 09:44

## 2023-08-18 RX ADMIN — PROPOFOL 100 MG: 10 INJECTION, EMULSION INTRAVENOUS at 09:51

## 2023-08-18 RX ADMIN — PROPOFOL 100 MG: 10 INJECTION, EMULSION INTRAVENOUS at 09:47

## 2023-08-18 RX ADMIN — PROPOFOL 250 MG: 10 INJECTION, EMULSION INTRAVENOUS at 10:19

## 2023-08-18 RX ADMIN — PROPOFOL 50 MG: 10 INJECTION, EMULSION INTRAVENOUS at 09:55

## 2023-08-18 RX ADMIN — SODIUM CHLORIDE, SODIUM LACTATE, POTASSIUM CHLORIDE, AND CALCIUM CHLORIDE: .6; .31; .03; .02 INJECTION, SOLUTION INTRAVENOUS at 09:40

## 2023-08-18 RX ADMIN — LIDOCAINE HYDROCHLORIDE 100 MG: 20 INJECTION, SOLUTION EPIDURAL; INFILTRATION; INTRACAUDAL; PERINEURAL at 09:44

## 2023-08-18 RX ADMIN — PROPOFOL 50 MG: 10 INJECTION, EMULSION INTRAVENOUS at 09:45

## 2023-08-18 RX ADMIN — PROPOFOL 50 MG: 10 INJECTION, EMULSION INTRAVENOUS at 09:58

## 2023-08-18 RX ADMIN — PROPOFOL 100 MG: 10 INJECTION, EMULSION INTRAVENOUS at 10:01

## 2023-08-18 RX ADMIN — PROPOFOL 100 MG: 10 INJECTION, EMULSION INTRAVENOUS at 09:49

## 2023-08-18 NOTE — ANESTHESIA POSTPROCEDURE EVALUATION
Post-Op Assessment Note    CV Status:  Stable    Pain management: adequate     Mental Status:  Alert and awake   Hydration Status:  Euvolemic   PONV Controlled:  Controlled   Airway Patency:  Patent      Post Op Vitals Reviewed: Yes      Staff: Anesthesiologist         No notable events documented.     BP     Temp     Pulse    Resp      SpO2

## 2023-08-18 NOTE — H&P
[facility] and Physical -  [facility]  Nila Conner 41 y.o. female MRN: [id number]    HPI: Nila Conner is a 41y.o. year old female who presents for EGD for longstanding GERD and colonoscopy for chronic diarrhea w/ elevated calpro and CRP. REVIEW OF SYSTEMS: Per the HPI, and otherwise unremarkable.     Historical Information   Past Medical History:   Diagnosis Date   • GERD (gastroesophageal reflux disease)    • MRSA infection     bladder/ urine     Past Surgical History:   Procedure Laterality Date   •  SECTION     • EYE SURGERY      strabismus   • TOOTH EXTRACTION       Social History   Social History     Substance and Sexual Activity   Alcohol Use Not Currently   • Alcohol/week: 0.0 standard drinks of alcohol    Comment: rare     Social History     Substance and Sexual Activity   Drug Use Never     Social History     Tobacco Use   Smoking Status Never   Smokeless Tobacco Never     Family History   Problem Relation Age of Onset   • COPD Mother    • Colon polyps Mother    • Diabetes Father    • Colon polyps Father    • No Known Problems Sister    • No Known Problems Brother    • No Known Problems Son    • No Known Problems Daughter    • No Known Problems Brother    • No Known Problems Brother    • No Known Problems Brother    • No Known Problems Sister    • Gallbladder disease Sister        Meds/Allergies       Current Outpatient Medications:   •  levothyroxine 50 mcg tablet  •  benzonatate (TESSALON) 200 MG capsule  •  bisacodyl (DULCOLAX) 5 mg EC tablet  •  levonorgestrel (MIRENA) 20 MCG/24HR IUD  •  ofloxacin (FLOXIN) 0.3 % otic solution  •  omeprazole (PriLOSEC) 40 MG capsule  •  polyethylene glycol (MiraLax) 17 GM/SCOOP powder  •  predniSONE 20 mg tablet  •  predniSONE 20 mg tablet    Current Facility-Administered Medications:   •  lactated ringers infusion, 100 mL/hr, Intravenous, Continuous, 100 mL/hr at 23 0931    Allergies   Allergen Reactions   • Blackberry Flavor - Food Allergy Hives   • Shellfish-Derived Products - Food Allergy    • Tetanus Toxoid Hives and Edema       Objective   BP (!) 168/103   Pulse (!) 112   Temp (!) 96.5 °F (35.8 °C) (Temporal)   Resp 18   Ht 5' 8" (1.727 m)   Wt (!) 149 kg (329 lb)   SpO2 96%   BMI 50.02 kg/m²     PHYSICAL EXAM  Gen: NAD  Head: NCAT  CV: RRR  CHEST: CTAB  ABD: soft, NT/ND  EXT: no edema    ASSESSMENT/PLAN:  This is a 41y.o. year old female here for EGD and colonoscopy, and she is stable and optimized for her procedure.

## 2023-08-22 DIAGNOSIS — E03.9 HYPOTHYROIDISM, UNSPECIFIED TYPE: ICD-10-CM

## 2023-08-22 PROCEDURE — 88305 TISSUE EXAM BY PATHOLOGIST: CPT | Performed by: PATHOLOGY

## 2023-08-22 NOTE — TELEPHONE ENCOUNTER
Requested medication(s) are due for refill today: Yes  Patient has already received a courtesy refill: No  Other reason request has been forwarded to provider: Refill protocol failed    Next appointment 09-

## 2023-08-24 DIAGNOSIS — Z13.1 ENCOUNTER FOR SCREENING EXAMINATION FOR IMPAIRED GLUCOSE REGULATION AND DIABETES MELLITUS: ICD-10-CM

## 2023-08-24 DIAGNOSIS — E66.01 MORBID OBESITY (HCC): ICD-10-CM

## 2023-08-24 DIAGNOSIS — Z13.0 SCREENING FOR DEFICIENCY ANEMIA: ICD-10-CM

## 2023-08-24 DIAGNOSIS — R77.8: ICD-10-CM

## 2023-08-24 DIAGNOSIS — E03.9 HYPOTHYROIDISM, UNSPECIFIED TYPE: Primary | ICD-10-CM

## 2023-08-24 DIAGNOSIS — Z13.220 LIPID SCREENING: ICD-10-CM

## 2023-08-24 RX ORDER — LEVOTHYROXINE SODIUM 0.05 MG/1
TABLET ORAL
Qty: 30 TABLET | Refills: 0 | Status: SHIPPED | OUTPATIENT
Start: 2023-08-24 | End: 2023-09-01 | Stop reason: SDUPTHER

## 2023-08-24 NOTE — TELEPHONE ENCOUNTER
Please advise pt that she is overdue for labwork- I placed orders for her to obtain prior to her appt  Also, a repeat neck US was supposed to have been obtained October 2022, but pt never scheduled/obtained.  The order was placed 08/04/2022 - I don't know if radiology can still use that order- please call to find out

## 2023-08-25 DIAGNOSIS — R59.0 LYMPHADENOPATHY OF RIGHT CERVICAL REGION: Primary | ICD-10-CM

## 2023-08-25 NOTE — TELEPHONE ENCOUNTER
Called pt relayed message. Pt stated that she obtained US of thyroid, but there was no order in chart for the US neck soft tissue.  Pt requesting that order be put in chart again for pt to obtain

## 2023-08-25 NOTE — TELEPHONE ENCOUNTER
Pt was notified 08/04/2022 that repeat US neck needed to be obtained as per message copied below:  Could you please call our radiology dept and ask if they can use the order that I placed 08/04/2022 that is still in the system? Show images for US thyroid  US thyroid: Result Notes     Candelario Damon   8/4/2022  1:37 PM EDT       Left detailed message on machine, told to call back with any questions or concerns.     Maida Robison DO   8/4/2022  1:14 PM EDT       Please advise pt that I placed order for repeat US to be done in 3 months  (See telephone notes from 07/28/2022)

## 2023-08-31 ENCOUNTER — APPOINTMENT (OUTPATIENT)
Dept: LAB | Facility: HOSPITAL | Age: 38
End: 2023-08-31
Payer: COMMERCIAL

## 2023-08-31 DIAGNOSIS — Z13.1 ENCOUNTER FOR SCREENING EXAMINATION FOR IMPAIRED GLUCOSE REGULATION AND DIABETES MELLITUS: ICD-10-CM

## 2023-08-31 DIAGNOSIS — R77.8: ICD-10-CM

## 2023-08-31 DIAGNOSIS — E66.01 MORBID OBESITY (HCC): ICD-10-CM

## 2023-08-31 DIAGNOSIS — Z13.0 SCREENING FOR DEFICIENCY ANEMIA: ICD-10-CM

## 2023-08-31 DIAGNOSIS — E03.9 HYPOTHYROIDISM, UNSPECIFIED TYPE: ICD-10-CM

## 2023-08-31 DIAGNOSIS — Z13.220 LIPID SCREENING: ICD-10-CM

## 2023-08-31 LAB
ALBUMIN SERPL BCP-MCNC: 3.9 G/DL (ref 3.5–5)
ALP SERPL-CCNC: 109 U/L (ref 34–104)
ALT SERPL W P-5'-P-CCNC: 16 U/L (ref 7–52)
ANION GAP SERPL CALCULATED.3IONS-SCNC: 9 MMOL/L
AST SERPL W P-5'-P-CCNC: 17 U/L (ref 13–39)
BASOPHILS # BLD AUTO: 0.04 THOUSANDS/ÂΜL (ref 0–0.1)
BASOPHILS NFR BLD AUTO: 0 % (ref 0–1)
BILIRUB SERPL-MCNC: 0.34 MG/DL (ref 0.2–1)
BUN SERPL-MCNC: 11 MG/DL (ref 5–25)
CALCIUM SERPL-MCNC: 9.2 MG/DL (ref 8.4–10.2)
CHLORIDE SERPL-SCNC: 102 MMOL/L (ref 96–108)
CHOLEST SERPL-MCNC: 169 MG/DL
CO2 SERPL-SCNC: 25 MMOL/L (ref 21–32)
CREAT SERPL-MCNC: 0.85 MG/DL (ref 0.6–1.3)
EOSINOPHIL # BLD AUTO: 0.23 THOUSAND/ÂΜL (ref 0–0.61)
EOSINOPHIL NFR BLD AUTO: 2 % (ref 0–6)
ERYTHROCYTE [DISTWIDTH] IN BLOOD BY AUTOMATED COUNT: 14.1 % (ref 11.6–15.1)
GFR SERPL CREATININE-BSD FRML MDRD: 88 ML/MIN/1.73SQ M
GLUCOSE P FAST SERPL-MCNC: 117 MG/DL (ref 65–99)
HCT VFR BLD AUTO: 38.4 % (ref 34.8–46.1)
HDLC SERPL-MCNC: 40 MG/DL
HGB BLD-MCNC: 12 G/DL (ref 11.5–15.4)
IMM GRANULOCYTES # BLD AUTO: 0.03 THOUSAND/UL (ref 0–0.2)
IMM GRANULOCYTES NFR BLD AUTO: 0 % (ref 0–2)
LDLC SERPL CALC-MCNC: 106 MG/DL (ref 0–100)
LYMPHOCYTES # BLD AUTO: 3.19 THOUSANDS/ÂΜL (ref 0.6–4.47)
LYMPHOCYTES NFR BLD AUTO: 31 % (ref 14–44)
MCH RBC QN AUTO: 26 PG (ref 26.8–34.3)
MCHC RBC AUTO-ENTMCNC: 31.3 G/DL (ref 31.4–37.4)
MCV RBC AUTO: 83 FL (ref 82–98)
MONOCYTES # BLD AUTO: 0.53 THOUSAND/ÂΜL (ref 0.17–1.22)
MONOCYTES NFR BLD AUTO: 5 % (ref 4–12)
NEUTROPHILS # BLD AUTO: 6.42 THOUSANDS/ÂΜL (ref 1.85–7.62)
NEUTS SEG NFR BLD AUTO: 62 % (ref 43–75)
NONHDLC SERPL-MCNC: 129 MG/DL
NRBC BLD AUTO-RTO: 0 /100 WBCS
PLATELET # BLD AUTO: 352 THOUSANDS/UL (ref 149–390)
PMV BLD AUTO: 9.4 FL (ref 8.9–12.7)
POTASSIUM SERPL-SCNC: 4.1 MMOL/L (ref 3.5–5.3)
PROT SERPL-MCNC: 7.8 G/DL (ref 6.4–8.4)
RBC # BLD AUTO: 4.62 MILLION/UL (ref 3.81–5.12)
SODIUM SERPL-SCNC: 136 MMOL/L (ref 135–147)
T4 FREE SERPL-MCNC: 0.76 NG/DL (ref 0.61–1.12)
TRIGL SERPL-MCNC: 113 MG/DL
TSH SERPL DL<=0.05 MIU/L-ACNC: 4.79 UIU/ML (ref 0.45–4.5)
WBC # BLD AUTO: 10.44 THOUSAND/UL (ref 4.31–10.16)

## 2023-08-31 PROCEDURE — 80053 COMPREHEN METABOLIC PANEL: CPT

## 2023-08-31 PROCEDURE — 86800 THYROGLOBULIN ANTIBODY: CPT

## 2023-08-31 PROCEDURE — 84439 ASSAY OF FREE THYROXINE: CPT

## 2023-08-31 PROCEDURE — 80061 LIPID PANEL: CPT

## 2023-08-31 PROCEDURE — 85025 COMPLETE CBC W/AUTO DIFF WBC: CPT

## 2023-08-31 PROCEDURE — 84443 ASSAY THYROID STIM HORMONE: CPT

## 2023-08-31 PROCEDURE — 36415 COLL VENOUS BLD VENIPUNCTURE: CPT

## 2023-08-31 PROCEDURE — 84432 ASSAY OF THYROGLOBULIN: CPT

## 2023-09-01 ENCOUNTER — OFFICE VISIT (OUTPATIENT)
Dept: FAMILY MEDICINE CLINIC | Facility: CLINIC | Age: 38
End: 2023-09-01
Payer: COMMERCIAL

## 2023-09-01 VITALS
BODY MASS INDEX: 44.41 KG/M2 | HEART RATE: 75 BPM | DIASTOLIC BLOOD PRESSURE: 78 MMHG | TEMPERATURE: 97.1 F | WEIGHT: 293 LBS | SYSTOLIC BLOOD PRESSURE: 130 MMHG | OXYGEN SATURATION: 99 % | HEIGHT: 68 IN

## 2023-09-01 DIAGNOSIS — R59.0 LYMPHADENOPATHY, CERVICAL: ICD-10-CM

## 2023-09-01 DIAGNOSIS — E03.9 HYPOTHYROIDISM, UNSPECIFIED TYPE: Primary | ICD-10-CM

## 2023-09-01 DIAGNOSIS — R73.03 PREDIABETES: ICD-10-CM

## 2023-09-01 DIAGNOSIS — D72.820 LYMPHOCYTOSIS: ICD-10-CM

## 2023-09-01 DIAGNOSIS — E78.6 LOW HDL (UNDER 40): ICD-10-CM

## 2023-09-01 DIAGNOSIS — E66.01 MORBID OBESITY (HCC): ICD-10-CM

## 2023-09-01 DIAGNOSIS — R73.01 ELEVATED FASTING GLUCOSE: ICD-10-CM

## 2023-09-01 LAB — SL AMB POCT HEMOGLOBIN AIC: 6.2 (ref ?–6.5)

## 2023-09-01 PROCEDURE — 83036 HEMOGLOBIN GLYCOSYLATED A1C: CPT | Performed by: FAMILY MEDICINE

## 2023-09-01 PROCEDURE — 99214 OFFICE O/P EST MOD 30 MIN: CPT | Performed by: FAMILY MEDICINE

## 2023-09-01 RX ORDER — LEVOTHYROXINE SODIUM 0.07 MG/1
75 TABLET ORAL DAILY
Qty: 90 TABLET | Refills: 1 | Status: SHIPPED | OUTPATIENT
Start: 2023-09-01

## 2023-09-01 RX ORDER — ALBUTEROL SULFATE 90 UG/1
2 AEROSOL, METERED RESPIRATORY (INHALATION) EVERY 6 HOURS PRN
COMMUNITY
End: 2023-09-03 | Stop reason: SDUPTHER

## 2023-09-01 NOTE — PROGRESS NOTES
Assessment/Plan:       1. Hypothyroidism, unspecified type  Assessment & Plan:  Her lab values are, are slightly out of range and I am increasing her levothyroxine to 75 mcg once a day. She has 50 mcg tablets at home, and she will be taking 1.5 of those tablets until she runs through those and then I did e-scribe 75 mcg levothyroxine prescription to her pharmacy today to  when she runs out of the 50 mcg that she will be taking 1.5 tablets a day. Orders:  -     levothyroxine 75 mcg tablet; Take 1 tablet (75 mcg total) by mouth daily  -     TSH, 3rd generation; Future; Expected date: 10/26/2023  -     T4, free; Future; Expected date: 10/26/2023    2. Morbid obesity ([address])  -     Ambulatory Referral to Weight Management; Future    3. Elevated fasting glucose  Assessment & Plan:  Her hemoglobin A1C today definitely shows prediabetes, and I am putting in an order for medical weight management evaluation. Orders:  -     POCT hemoglobin A1c    4. Prediabetes  -     Ambulatory Referral to Weight Management; Future    5. Low HDL (under 40)  Assessment & Plan:  The patient has already started a diet program and she will continue this, and we will just monitor her lipid profile. 6. Lymphadenopathy, cervical  Comments:  on thyroid US; order was placed previously to obtain f/u neck US, but pt has not yet obtained- states today she will schedule  Assessment & Plan: This was seen on her thyroid ultrasound that was done in 07/2022. She is scheduled to obtain the head and neck ultrasound that was recommended as a follow-up by radiologist last year and we will reassess after this ultrasound has resulted. She does have very mild elevation of white blood cell count on labs that were done yesterday as well as labs from 2021 on chart review. So again, we will reassess after the repeat head and neck ultrasounds for the lymphadenopathy.       7. Lymphocytosis    Pt will obtain the repeat neck US that was ordered previously for the neck lymphadenopathy seen on thyroid US                  Subjective:   Chief Complaint   Patient presents with   • Follow-up        Patient ID: Tarsha Sanchez is a 41 y.o. female. Tarsha Sanchez is a 82-year-old female who presents for a follow-up visit. She consents to use [employer] for today. pt here today for long overdue f/u visit- last seen here 07/2022, has chronic medical conditions that require regular f/u at least 2x a year  She obtained labs yesterday and elev fasting glucose, TSH above goal range; the thyroglobulin result is still in process  She has chronic hypothyroidism that is being followed up today as well - has been on 50mcg synthroid since first started on thyroid hormone thx 01/2022. August 24, 2023  Me  8/24/23  5:01 PM  Note      Please advise pt that she is overdue for labwork- I placed orders for her to obtain prior to her appt  Also, a repeat neck US was supposed to have been obtained October 2022, but pt never scheduled/obtained. The order was placed 08/04/2022 - I don't know if radiology can still use that order- please call to find out      8/24/23  5:01 PM  You routed this conversation to [address] [facility] [address] [facility]   August 25, 2023  Rachel Pennington  to [facility]    8/25/23  8:49 AM  Note      Called pt relayed message. Pt stated that she obtained US of thyroid, but there was no order in chart for the US neck soft tissue.  Pt requesting that order be put in chart again for pt to obtain      Me  to [facility]     8/25/23 11:15 AM  Note      Pt was notified 08/04/2022 that repeat US neck needed to be obtained as per message copied below:  Could you please call our radiology dept and ask if they can use the order that I placed 08/04/2022 that is still in the system?   Show images for US thyroid  US thyroid: Result Notes     Rachel Pennington   8/4/2022  1:37 PM EDT        Left detailed message on machine, told to call back with any questions or concerns. Edna Juarez, DO   8/4/2022  1:14 PM EDT        Please advise pt that I placed order for repeat US to be done in 3 months  (See telephone notes from 07/28/2022)            Hemanthshaina Mckeonhenry  to Me    [facility]    8/25/23  1:46 PM  Note      Called radiology order is over a year old so they can not use. Me  [facility]       8/25/23  2:40 PM  Note      Ok- I placed new order        She is trying to lose weight. She drinks at least 4 bottles of water a day. She has reduced back to drinking 1 can of soda, and she drinks it at dinner. She drinks about 8 ounces of coffee that has sugar in it. She has been eating toasted bread during breakfast and lunch. She has not been dining out as often. She has been cutting out her diet intake but expresses concern that she does not feel significant changes. For the mildly abnormal cholesterol profile, we discuss increasing Omega-3 oils in her diet - eg given of fish - pt then reports feeling discomfort when she consumes shellfish, sea food or any of the similar mixture. I advise she could try an Omega-3 supplement, or we could recheck in several month given that she has initiated diet changes and is trying to lose weight    Also c/o She is still wheezing and feels tightness. She had an episode last night of feeling a burning sensation from her sternum up to her neck prior to wheezing. Her wheezing happens mostly at nighttime and has audible wheezing as well. She was seen at [address] [facility] for these sx 12/21/2022 and was rx'd albuterol inhaler, which she still has at home - She is using the albuterol - relieves her wheezing and tightness. States her first episode of Covid was in 12/2021, and she had the lingering wheeze since then.  She had undergone a pulmonary function test in 10/2022 while being on steroids due to a sinus infection, she is unsure if the sinus infection had contributed to the worsening of her wheezing. She has been experiencing insomnia in the last 6 months and states that her arms and legs feel restless. She only gets about 2 hours of sleep, and she is usually up at 4:30 a.m. She has been taking ibuprofen and Tylenol, but it has not relieved her. She had undergone a colonoscopy and EGD a few weeks ago for chronic diarrhea and GERD. She had previously seen an ENT and her weight was around 370 pounds there per pt. She was seen for sinus infection and had punctured her left ear with a cotton swab. She has episodes of pain in her ear but it does not bother her overall, and she does not need follow-up with the ENT. She denies smoking but she spends time with people who smoke. She just got her refill for Synthroid yesterday. She is experiencing congestion due to her allergies. Weight Today: 328 pounds. Recent lab results - labs obtained yesterday-  include:  TSH: Slightly above goal range. LDL cholesterol: Slightly above the ideal range. HDL cholesterol: Slightly lower than the ideal range. WBC slightly above range    Comparison to Lab results in 08/2022 include:  TSH level: Within normal range. Comparison to Lab results in 2021 include:  TSH levels: above normal range. WBC: Slightly above ideal range. The following portions of the patient's history were reviewed and updated as appropriate: allergies, current medications, past family history, past medical history, past social history, past surgical history and problem list.    Review of Systems  The pertinent positive and negative findings are as noted in the HPI. Objective:  /78 (BP Location: Left arm, Patient Position: Sitting, Cuff Size: Large)   Pulse 75   Temp (!) 97.1 °F (36.2 °C) (Tympanic)   Ht 5' 8" (1.727 m)   Wt (!) 149 kg (328 lb)   SpO2 99%   BMI 49.87 kg/m²          Physical Exam  Vitals and nursing note reviewed. Constitutional:       General: She is not in acute distress.      Appearance: She is well-groomed. She is morbidly obese. She is not ill-appearing, toxic-appearing or diaphoretic. HENT:      Head: Normocephalic and atraumatic. Mouth/Throat:      Pharynx: Uvula midline. Neck:      Thyroid: No thyroid mass, thyromegaly or thyroid tenderness. Trachea: Trachea and phonation normal.   Cardiovascular:      Rate and Rhythm: Normal rate and regular rhythm. Heart sounds: Normal heart sounds. Pulmonary:      Effort: Pulmonary effort is normal.      Breath sounds: Normal breath sounds and air entry. Abdominal:      Palpations: Abdomen is soft. Tenderness: There is no abdominal tenderness. Musculoskeletal:      Cervical back: Neck supple. Right lower leg: No edema. Left lower leg: No edema. Lymphadenopathy:      Head:      Right side of head: No occipital adenopathy. Left side of head: No occipital adenopathy. Cervical:      Right cervical: No superficial, deep or posterior cervical adenopathy. Left cervical: No superficial, deep or posterior cervical adenopathy. Skin:     Coloration: Skin is not pale. Neurological:      Mental Status: She is alert and oriented to person, place, and time. Gait: Gait normal.   Psychiatric:         Mood and Affect: Mood normal.         Behavior: Behavior normal. Behavior is cooperative. I have personally reviewed results with the patient. Transcribed for Todd Haque DO, by Ace Nj on 09/04/23 at 10:02 PM. Powered by [employer].

## 2023-09-01 NOTE — ASSESSMENT & PLAN NOTE
This was seen on her thyroid ultrasound that was done in 07/2022. She is scheduled to obtain the head and neck ultrasound that was recommended as a follow-up by radiologist last year and we will reassess after this ultrasound has resulted. She does have very mild elevation of white blood cell count on labs that were done yesterday as well as labs from 2021 on chart review. So again, we will reassess after the repeat head and neck ultrasounds for the lymphadenopathy.

## 2023-09-01 NOTE — ASSESSMENT & PLAN NOTE
Her lab values are, are slightly out of range and I am increasing her levothyroxine to 75 mcg once a day. She has 50 mcg tablets at home, and she will be taking 1.5 of those tablets until she runs through those and then I did e-scribe 75 mcg levothyroxine prescription to her pharmacy today to  when she runs out of the 50 mcg that she will be taking 1.5 tablets a day.

## 2023-09-01 NOTE — ASSESSMENT & PLAN NOTE
Her hemoglobin A1C today definitely shows prediabetes, and I am putting in an order for medical weight management evaluation.

## 2023-09-01 NOTE — ASSESSMENT & PLAN NOTE
The patient has already started a diet program and she will continue this, and we will just monitor her lipid profile.

## 2023-09-03 DIAGNOSIS — R06.2 WHEEZING: Primary | ICD-10-CM

## 2023-09-05 DIAGNOSIS — R06.2 WHEEZING: ICD-10-CM

## 2023-09-05 RX ORDER — ALBUTEROL SULFATE 90 UG/1
2 AEROSOL, METERED RESPIRATORY (INHALATION) EVERY 6 HOURS PRN
Qty: 8 G | Refills: 0 | Status: SHIPPED | OUTPATIENT
Start: 2023-09-05

## 2023-09-05 RX ORDER — ALBUTEROL SULFATE 90 UG/1
2 AEROSOL, METERED RESPIRATORY (INHALATION) EVERY 6 HOURS PRN
Qty: 8 G | Refills: 0 | OUTPATIENT
Start: 2023-09-05

## 2023-09-05 NOTE — TELEPHONE ENCOUNTER
Requested medication(s) are due for refill today: Yes  Patient has already received a courtesy refill: No  Other reason request has been forwarded to provider: Pt received from historical provider, ok to refill

## 2023-09-07 LAB
THYROGLOB AB SERPL-ACNC: 1 IU/ML (ref 0–0.9)
THYROGLOB SERPL-MCNC: 2.8 NG/ML

## 2023-09-08 ENCOUNTER — HOSPITAL ENCOUNTER (OUTPATIENT)
Dept: ULTRASOUND IMAGING | Facility: HOSPITAL | Age: 38
End: 2023-09-08
Payer: COMMERCIAL

## 2023-09-08 DIAGNOSIS — R59.0 LYMPHADENOPATHY OF RIGHT CERVICAL REGION: ICD-10-CM

## 2023-09-08 PROCEDURE — 76536 US EXAM OF HEAD AND NECK: CPT

## 2023-10-13 ENCOUNTER — OFFICE VISIT (OUTPATIENT)
Dept: FAMILY MEDICINE CLINIC | Facility: CLINIC | Age: 38
End: 2023-10-13
Payer: COMMERCIAL

## 2023-10-13 VITALS
DIASTOLIC BLOOD PRESSURE: 98 MMHG | OXYGEN SATURATION: 96 % | WEIGHT: 293 LBS | TEMPERATURE: 96.7 F | HEART RATE: 116 BPM | SYSTOLIC BLOOD PRESSURE: 158 MMHG | HEIGHT: 68 IN | BODY MASS INDEX: 44.41 KG/M2

## 2023-10-13 DIAGNOSIS — R05.3 CHRONIC COUGH: Primary | ICD-10-CM

## 2023-10-13 DIAGNOSIS — J34.89 NASAL DRAINAGE: ICD-10-CM

## 2023-10-13 PROCEDURE — 99214 OFFICE O/P EST MOD 30 MIN: CPT | Performed by: FAMILY MEDICINE

## 2023-10-13 RX ORDER — AZELASTINE 1 MG/ML
1 SPRAY, METERED NASAL 2 TIMES DAILY
Qty: 30 ML | Refills: 1 | Status: SHIPPED | OUTPATIENT
Start: 2023-10-13

## 2023-10-13 RX ORDER — BENZONATATE 200 MG/1
200 CAPSULE ORAL 3 TIMES DAILY PRN
Qty: 20 CAPSULE | Refills: 0 | Status: SHIPPED | OUTPATIENT
Start: 2023-10-13

## 2023-10-13 NOTE — PROGRESS NOTES
Name: Emogene Nageotte      : 1985      MRN: [id number]  Encounter Provider: Aleah Killian DO  Encounter Date: 10/13/2023   Encounter department: [address] [place] [address]     1. Chronic cough  -     azelastine (ASTELIN) 0.1 % nasal spray; 1 spray into each nostril 2 (two) times a day Use in each nostril as directed  -     Ambulatory Referral to Pulmonology; Future  -     XR chest pa & lateral; Future; Expected date: 10/13/2023  -     benzonatate (TESSALON) 200 MG capsule; Take 1 capsule (200 mg total) by mouth 3 (three) times a day as needed for cough    2.  Nasal drainage  -     azelastine (ASTELIN) 0.1 % nasal spray; 1 spray into each nostril 2 (two) times a day Use in each nostril as directed    Chronic cough developed with/after non-COVID viral infection 1 month ago; pt has been using albuterol inhaler for this, though she had a PFT 10/2022 for PASC resp sx which was normal and no response after bronchodilator admin; the albuterol inhaler had been rx'd by outside clinician for COVID infection couple of years ago - I advise pt to stop using the inhaler       Subjective      Chief Complaint   Patient presents with   • Cough     Been having a cough and post nasal drip for a month, pt says when she coughs it sounds like a bronchitis cough       Sick visit- c/o 1 month of cough sx - states her children all had colds and she caught from them last month, all other sx resolved, but cough persists with whitish mucous- "all day, all night, like  bark cough" - talking, laughing, and warm ambient air triggers per pt  "Sneezing, too"  Taking mucinex DM with some benefit  Took 3 COVID tests spread out- all negative   "Using the inhaler, seems to open it up, and then there's a coughing fit" - albuterol had first been rx'd by outside clinician with her COVID infection couple of yrs ago, pt requested refill 1 mo ago for wheezing when first developed cold sx  Pt had PFT 1 year ago for PASC sx - states this cough is unrelated to/different from prior [facility] sx  Sees her GI for f/u in January- on high dose PPI for GERD         Pulmonary Function Test Report  Tommie Llanes 30 y.o. female MRN: [id number]  Date of Testing: 10/14/2022  Requesting Physician:   Ken Montesinos DO  [address] [facility],  [address] [place] [address]   Reason for Testing:  COVID-19, wheezing  Reference set for interpretation: [address] 2012  Procedure: The patient was taken to pulmonary function testing laboratory. The patient demonstrated good effort and cooperation. The results of this test meet ATS standards for acceptability and repeatability. Data set appears appropriate for interpretation. Post bronchodilator testing performed after the administration of 2.5mg albuterol in 3cc normal saline administered via nebulizer per bronchodilator protocol. Results:  FEV1/FVC Ratio:  85%, FEV1 2.89 L/81%, FVC 3.4 L/78%  Lung volumes by body plethysmography: TLC 79%, RV 68%, DLCO 86%  MVV 96% of predicted  MEP (90 cmH2O) 59% of predicted  MIP (-139 cmH2O)160% of predicted  Interpretation:  · Spirometer shows no obstructive ventilatory defect   · No significant response after administration of bronchodilator according to the ats standards  · Normal lung volumes   · Normal diffusion capacity   · Normal airway resistance   · The maximal respiratory pressures are within normal limits, excluding clinically significant respiratory muscle dysfunction   Clinical correlation is recommend  Chiquita Martinez M.D. [facility]      Cough  This is a new problem. The current episode started more than 1 month ago. The problem has been rapidly worsening. The problem occurs every few minutes. The cough is Productive of sputum and productive of bloody sputum. Associated symptoms include ear congestion, headaches, postnasal drip, rhinorrhea, shortness of breath and wheezing.  Pertinent negatives include no chest pain, chills, ear pain, fever, heartburn, hemoptysis, myalgias, rash, sore throat, sweats or weight loss. Nothing aggravates the symptoms. Review of Systems   Constitutional:  Negative for chills, fever and weight loss. HENT:  Positive for postnasal drip and rhinorrhea. Negative for ear pain and sore throat. Respiratory:  Positive for cough, shortness of breath and wheezing. Negative for hemoptysis. Cardiovascular:  Negative for chest pain. Gastrointestinal:  Negative for heartburn. Musculoskeletal:  Negative for myalgias. Skin:  Negative for rash. Neurological:  Positive for headaches. Current Outpatient Medications on File Prior to Visit   Medication Sig   • levonorgestrel (MIRENA) 20 MCG/24HR IUD 1 each by Intrauterine route once   • levothyroxine 75 mcg tablet Take 1 tablet (75 mcg total) by mouth daily   • omeprazole (PriLOSEC) 40 MG capsule Take 1 capsule (40 mg total) by mouth daily       Objective     /98 (BP Location: Left arm, Patient Position: Sitting, Cuff Size: Standard)   Pulse (!) 116   Temp (!) 96.7 °F (35.9 °C) (Tympanic)   Ht 5' 8" (1.727 m)   Wt (!) 148 kg (326 lb 6.4 oz)   SpO2 96%   BMI 49.63 kg/m²     Physical Exam  Vitals and nursing note reviewed. Constitutional:       General: She is not in acute distress. Appearance: She is well-groomed. She is morbidly obese. She is not ill-appearing, toxic-appearing or diaphoretic. HENT:      Head: Normocephalic and atraumatic. Right Ear: Tympanic membrane, ear canal and external ear normal.      Left Ear: Tympanic membrane, ear canal and external ear normal.      Nose: Mucosal edema present. No congestion. Right Turbinates: Pale. Left Turbinates: Pale. Right Sinus: No maxillary sinus tenderness or frontal sinus tenderness. Left Sinus: No maxillary sinus tenderness or frontal sinus tenderness. Mouth/Throat:      Lips: Pink.       Mouth: Mucous membranes are moist.      Pharynx: Oropharynx is clear. Uvula midline. Neck:      Thyroid: No thyroid mass, thyromegaly or thyroid tenderness. Vascular: No JVD. Trachea: Trachea and phonation normal.   Cardiovascular:      Rate and Rhythm: Normal rate and regular rhythm. Pulses: Normal pulses. Heart sounds: Normal heart sounds. Pulmonary:      Effort: Pulmonary effort is normal.      Breath sounds: Normal breath sounds and air entry. Comments: Pt coughs frequently during visit- dry cough  Abdominal:      Palpations: Abdomen is soft. Tenderness: There is no abdominal tenderness. Musculoskeletal:      Cervical back: Neck supple. Right lower leg: No edema. Left lower leg: No edema. Lymphadenopathy:      Cervical: No cervical adenopathy. Skin:     General: Skin is warm and dry. Coloration: Skin is not pale. Neurological:      Mental Status: She is alert and oriented to person, place, and time. Gait: Gait normal.   Psychiatric:         Mood and Affect: Mood normal.         Behavior: Behavior normal. Behavior is cooperative.      [place] Sang, DO

## 2023-10-20 ENCOUNTER — APPOINTMENT (OUTPATIENT)
Dept: RADIOLOGY | Facility: CLINIC | Age: 38
End: 2023-10-20
Payer: COMMERCIAL

## 2023-10-20 DIAGNOSIS — R05.3 CHRONIC COUGH: ICD-10-CM

## 2023-10-20 PROCEDURE — 71046 X-RAY EXAM CHEST 2 VIEWS: CPT

## 2023-12-05 DIAGNOSIS — K21.9 GASTROESOPHAGEAL REFLUX DISEASE, UNSPECIFIED WHETHER ESOPHAGITIS PRESENT: ICD-10-CM

## 2023-12-05 DIAGNOSIS — E03.9 HYPOTHYROIDISM, UNSPECIFIED TYPE: ICD-10-CM

## 2023-12-05 RX ORDER — LEVOTHYROXINE SODIUM 0.07 MG/1
75 TABLET ORAL DAILY
Qty: 90 TABLET | Refills: 0 | Status: SHIPPED | OUTPATIENT
Start: 2023-12-05

## 2023-12-05 RX ORDER — OMEPRAZOLE 40 MG/1
40 CAPSULE, DELAYED RELEASE ORAL DAILY
Qty: 90 CAPSULE | Refills: 0 | Status: SHIPPED | OUTPATIENT
Start: 2023-12-05

## 2024-01-01 DIAGNOSIS — R05.3 CHRONIC COUGH: ICD-10-CM

## 2024-01-01 DIAGNOSIS — J34.89 NASAL DRAINAGE: ICD-10-CM

## 2024-01-02 RX ORDER — AZELASTINE HYDROCHLORIDE 137 UG/1
1 SPRAY, METERED NASAL 2 TIMES DAILY
Qty: 30 ML | Refills: 1 | Status: SHIPPED | OUTPATIENT
Start: 2024-01-02

## 2024-01-25 ENCOUNTER — APPOINTMENT (OUTPATIENT)
Dept: LAB | Facility: CLINIC | Age: 39
End: 2024-01-25
Payer: COMMERCIAL

## 2024-01-25 DIAGNOSIS — E03.9 HYPOTHYROIDISM, UNSPECIFIED TYPE: ICD-10-CM

## 2024-01-25 LAB
T4 FREE SERPL-MCNC: 0.74 NG/DL (ref 0.61–1.12)
TSH SERPL DL<=0.05 MIU/L-ACNC: 3.86 UIU/ML (ref 0.45–4.5)

## 2024-01-25 PROCEDURE — 84443 ASSAY THYROID STIM HORMONE: CPT

## 2024-01-25 PROCEDURE — 36415 COLL VENOUS BLD VENIPUNCTURE: CPT

## 2024-01-25 PROCEDURE — 84439 ASSAY OF FREE THYROXINE: CPT

## 2024-01-26 ENCOUNTER — OFFICE VISIT (OUTPATIENT)
Dept: URGENT CARE | Facility: CLINIC | Age: 39
End: 2024-01-26
Payer: COMMERCIAL

## 2024-01-26 VITALS
RESPIRATION RATE: 18 BRPM | SYSTOLIC BLOOD PRESSURE: 160 MMHG | OXYGEN SATURATION: 96 % | DIASTOLIC BLOOD PRESSURE: 84 MMHG | HEART RATE: 118 BPM | TEMPERATURE: 97.5 F

## 2024-01-26 DIAGNOSIS — H66.92 LEFT ACUTE OTITIS MEDIA: Primary | ICD-10-CM

## 2024-01-26 PROCEDURE — 99213 OFFICE O/P EST LOW 20 MIN: CPT | Performed by: PHYSICIAN ASSISTANT

## 2024-01-26 RX ORDER — AMOXICILLIN 500 MG/1
500 CAPSULE ORAL EVERY 12 HOURS SCHEDULED
Qty: 14 CAPSULE | Refills: 0 | Status: SHIPPED | OUTPATIENT
Start: 2024-01-26 | End: 2024-02-02

## 2024-01-26 NOTE — PATIENT INSTRUCTIONS
Take antibiotics as prescribed for left ear infection. Use nasal saline spray for symptomatic treatment.    Stay hydrated with lots of water/fluids.     Follow-up with PCP in the next 1-2 days for reexamination and to ensure resolution of symptoms.  Go to the ED if any fevers, unable to stay hydrated, abdominal pain, chest pain, shortness of breath, change in voice, pain or difficulty swallowing, pain swelling redness behind the ear, ear drainage, hearing changes, new or worsening symptoms or other concerning symptoms.

## 2024-01-26 NOTE — PROGRESS NOTES
[facility]        NAME: Shelley Tineo is a 38 y.o. female  : 1985    MRN: [id number]  DATE: 2024  TIME: 1:08 PM    Assessment and Plan   Left acute otitis media [H66.92]  1. Left acute otitis media  amoxicillin (AMOXIL) 500 mg capsule        Declined covid/flu testing  Will treat left AOM    Patient Instructions     Take antibiotics as prescribed for left ear infection. Use nasal saline spray for symptomatic treatment.    Stay hydrated with lots of water/fluids.     Follow-up with PCP in the next 1-2 days for reexamination and to ensure resolution of symptoms.  Go to the ED if any fevers, unable to stay hydrated, abdominal pain, chest pain, shortness of breath, change in voice, pain or difficulty swallowing, pain swelling redness behind the ear, ear drainage, hearing changes, new or worsening symptoms or other concerning symptoms.      Chief Complaint     Chief Complaint   Patient presents with    Earache     Left ear started 1 month ago          History of Present Illness       38-year-old female presents for evaluation of left ear pain, runny nose, nasal congestion, postnasal drip on and off x 1 month.  States she has tried her allergy medication, over-the-counter decongestants, over-the-counter eardrops, nasal sprays with some improvement.  She denies any fevers, chills or bodyaches.  Denies any sore throat, cough or cold-like symptoms.  Denies any recent travel or known sick contacts.  Declines COVID/flu testing.  Eating and drinking normally, staying hydrated.  Denies any headache, dizziness, hearing changes, pain swelling redness behind the ear, ear drainage, chest pain, chest tightness, shortness of breath, wheezing, GI/ symptoms or other complaints.  Denies any pregnancy risk.        Review of Systems   Review of Systems   Constitutional:  Negative for activity change, appetite change, chills, fatigue and fever.   HENT:  Positive for congestion, ear pain and postnasal drip.  Negative for ear discharge, facial swelling, hearing loss, rhinorrhea, sinus pressure, sore throat, trouble swallowing and voice change.    Eyes:  Negative for discharge, itching and visual disturbance.   Respiratory:  Negative for cough, chest tightness, shortness of breath and wheezing.    Cardiovascular:  Negative for chest pain.   Gastrointestinal:  Negative for abdominal pain, diarrhea, nausea and vomiting.   Musculoskeletal:  Negative for back pain and neck pain.   Skin:  Negative for rash.   Neurological:  Negative for dizziness, syncope, weakness, numbness and headaches.   All other systems reviewed and are negative.        Current Medications       Current Outpatient Medications:     amoxicillin (AMOXIL) 500 mg capsule, Take 1 capsule (500 mg total) by mouth every 12 (twelve) hours for 7 days, Disp: 14 capsule, Rfl: 0    Azelastine HCl 137 MCG/SPRAY SOLN, instill 1 spray into each nostril twice a day, Disp: 30 mL, Rfl: 1    benzonatate (TESSALON) 200 MG capsule, Take 1 capsule (200 mg total) by mouth 3 (three) times a day as needed for cough, Disp: 20 capsule, Rfl: 0    levonorgestrel (MIRENA) 20 MCG/24HR IUD, 1 each by Intrauterine route once, Disp: , Rfl:     levothyroxine 75 mcg tablet, Take 1 tablet (75 mcg total) by mouth daily, Disp: 90 tablet, Rfl: 0    omeprazole (PriLOSEC) 40 MG capsule, Take 1 capsule (40 mg total) by mouth daily, Disp: 90 capsule, Rfl: 0    Current Allergies     Allergies as of 01/26/2024 - Reviewed 01/26/2024   Allergen Reaction Noted    Blackberry flavor - food allergy Hives 09/23/2021    Shellfish-derived products - food allergy  09/26/2019    Tetanus toxoid Hives and Edema 02/08/2016            The following portions of the patient's history were reviewed and updated as appropriate: allergies, current medications, past family history, past medical history, past social history, past surgical history and problem list.     Past Medical History:   Diagnosis Date    GERD  (gastroesophageal reflux disease)     MRSA infection     bladder/ urine       Past Surgical History:   Procedure Laterality Date     SECTION      EYE SURGERY      strabismus    TOOTH EXTRACTION         Family History   Problem Relation Age of Onset    COPD Mother     Colon polyps Mother     Diabetes Father     Colon polyps Father     No Known Problems Sister     No Known Problems Brother     No Known Problems Son     No Known Problems Daughter     No Known Problems Brother     No Known Problems Brother     No Known Problems Brother     No Known Problems Sister     Gallbladder disease Sister          Medications have been verified.        Objective   /84   Pulse (!) 118   Temp 97.5 °F (36.4 °C)   Resp 18   SpO2 96%        Physical Exam     Physical Exam  Vitals and nursing note reviewed.   Constitutional:       General: She is not in acute distress.     Appearance: Normal appearance. She is not ill-appearing or toxic-appearing.   HENT:      Head: Normocephalic and atraumatic.      Right Ear: Hearing, tympanic membrane, ear canal and external ear normal. No mastoid tenderness.      Left Ear: Hearing, ear canal and external ear normal. No mastoid tenderness. Tympanic membrane is erythematous and bulging.      Mouth/Throat:      Mouth: Mucous membranes are moist.      Pharynx: Uvula midline. No oropharyngeal exudate, posterior oropharyngeal erythema or uvula swelling.      Comments: Mild post nasal drip visualized  Eyes:      Pupils: Pupils are equal, round, and reactive to light.   Cardiovascular:      Rate and Rhythm: Normal rate and regular rhythm.      Heart sounds: Normal heart sounds.   Pulmonary:      Effort: Pulmonary effort is normal.      Breath sounds: Normal breath sounds. No wheezing.   Skin:     Capillary Refill: Capillary refill takes less than 2 seconds.   Neurological:      Mental Status: She is alert and oriented to person, place, and time.   Psychiatric:         Mood and Affect: Mood  normal.         Behavior: Behavior normal.

## 2024-02-09 ENCOUNTER — TELEPHONE (OUTPATIENT)
Dept: FAMILY MEDICINE CLINIC | Facility: CLINIC | Age: 39
End: 2024-02-09

## 2024-02-09 NOTE — TELEPHONE ENCOUNTER
Please let pt know that her thyroid labs are in range -  no need to change the 75mcg synthroid dose            January 29, 2024  Nighat Rose   to Me   KL    1/29/24 11:16 AM  Please see pt message below as I am confused. Is her Levothyroxine 75 mcg as stated on med list? Med was also dispensed as 50 mcg as one point as well       JT    1/29/24 11:05 AM  Viktoriya Piper routed this conversation to [facility]  January 28, 2024  Shelley Tineo   to  Primary Care [facility] (supporting You)         1/28/24  8:56 AM  hello,  Before I renew my levothroxine meds i want to know if its going up to the next mcg i dont want to get stuck pay for 2 different prescriptions and cutting them in half was not an easy thing todo most of them crumbled. And I refuse to waste money specially when times are tough and cant be wasting money.

## 2024-02-23 ENCOUNTER — OFFICE VISIT (OUTPATIENT)
Age: 39
End: 2024-02-23
Payer: COMMERCIAL

## 2024-02-23 VITALS
SYSTOLIC BLOOD PRESSURE: 122 MMHG | HEIGHT: 68 IN | WEIGHT: 293 LBS | OXYGEN SATURATION: 99 % | HEART RATE: 86 BPM | TEMPERATURE: 98.2 F | BODY MASS INDEX: 44.41 KG/M2 | RESPIRATION RATE: 18 BRPM | DIASTOLIC BLOOD PRESSURE: 78 MMHG

## 2024-02-23 DIAGNOSIS — E03.9 HYPOTHYROIDISM, UNSPECIFIED TYPE: ICD-10-CM

## 2024-02-23 DIAGNOSIS — K21.9 GASTROESOPHAGEAL REFLUX DISEASE WITHOUT ESOPHAGITIS: Primary | ICD-10-CM

## 2024-02-23 DIAGNOSIS — Z79.899 LONG-TERM CURRENT USE OF PROTON PUMP INHIBITOR THERAPY: ICD-10-CM

## 2024-02-23 DIAGNOSIS — R19.5 ELEVATED FECAL CALPROTECTIN: ICD-10-CM

## 2024-02-23 DIAGNOSIS — E66.01 MORBID OBESITY (HCC): ICD-10-CM

## 2024-02-23 DIAGNOSIS — K21.9 GASTROESOPHAGEAL REFLUX DISEASE, UNSPECIFIED WHETHER ESOPHAGITIS PRESENT: ICD-10-CM

## 2024-02-23 DIAGNOSIS — K58.0 IRRITABLE BOWEL SYNDROME WITH DIARRHEA: ICD-10-CM

## 2024-02-23 PROCEDURE — 99214 OFFICE O/P EST MOD 30 MIN: CPT | Performed by: STUDENT IN AN ORGANIZED HEALTH CARE EDUCATION/TRAINING PROGRAM

## 2024-02-23 RX ORDER — LEVOTHYROXINE SODIUM 0.07 MG/1
75 TABLET ORAL DAILY
Qty: 90 TABLET | Refills: 1 | Status: SHIPPED | OUTPATIENT
Start: 2024-02-23

## 2024-02-23 RX ORDER — OMEPRAZOLE 40 MG/1
40 CAPSULE, DELAYED RELEASE ORAL
Qty: 90 CAPSULE | Refills: 1 | Status: SHIPPED | OUTPATIENT
Start: 2024-02-23

## 2024-02-23 RX ORDER — FAMOTIDINE 20 MG/1
20 TABLET, FILM COATED ORAL
Qty: 30 TABLET | Refills: 2 | Status: SHIPPED | OUTPATIENT
Start: 2024-02-23

## 2024-02-23 NOTE — PROGRESS NOTES
[facility]  Outpatient Follow-up  Encounter: [id number]    PATIENT INFO     Name: Shelley Tineo  YOB: 1985   Age: 39 y.o.   Sex: female   MRN: [id number]    ASSESSMENT & PLAN     Shelley Tineo is a 39 y.o. female with complaints of GERD, diarrhea, history of elevated fecal calprotectin level and CRP levels, and obesity.    Problem List Items Addressed This Visit       GERD (gastroesophageal reflux disease) - Primary     Continues to have symptoms.  EGD fairly unremarkable.  She also has history of wheezing which may be indicative of LPR.  Currently on omeprazole but has increased dose to better control her symptoms.  She does admit to eating certain trigger foods and potentially later in the evening which could exacerbate her symptoms.  Her weight will also exacerbate her reflux symptoms which was explained to her as well.    Recommend omeprazole 40 mg daily before lunch as she takes levothyroxine in the morning before breakfast  Start Pepcid 20 mg daily at bedtime  If Pepcid is ineffective, she may also increase omeprazole to twice daily  If this is ineffective, can consider alternate PPI versus possibly pH monitoring/Tim test off PPI to confirm GERD versus functional dyspepsia  Encouraged patient on weight loss which would also help improve her symptoms  Encourage lifestyle and dietary measures to minimize reflux symptoms         Relevant Medications    famotidine (PEPCID) 20 mg tablet    omeprazole (PriLOSEC) 40 MG capsule    Long-term current use of proton pump inhibitor therapy     As noted above.         Irritable bowel syndrome with diarrhea     Suspect she has some degree of underlying irritable bowel syndrome with diarrhea.  Has had symptoms for many years with more days throughout the week where she experiences symptoms than not.  She does have associated abdominal pain during episodes where she feels that she needs to go to the bathroom.  Symptoms are  relieved with having bowel movement.  No nocturnal symptoms.  IBS would not explain the elevated fecal calprotectin and CRP levels which were previously seen.  The significance of this is uncertain as her biopsies from endoscopy and colonoscopy did not show any signs of IBD.  She could certainly have isolated small bowel disease although lower suspicion for this.  Possible SIBO as well although less likely without any bloating or flatulence.  May have certain sensitivity to caffeine as well.    We will check hydrogen/methane breath test to assess for underlying SIBO  If confirmed or if symptoms persist, recommend trial of rifaximin  Encourage patient to try to decrease caffeine intake  Could also consider small bowel evaluation if symptoms persist and above measures are not effective (CT versus MR enterography versus video capsule endoscopy)         Relevant Orders    C-reactive protein    Morbid obesity (HCC)     Current BMI 50.78.  Patient informs me that she has recently joined a gym for her daughter.  Has been referred to weight management in the past but has not seen them yet.    Encouraged her to make efforts at weight loss including watching her diet and increasing cardiovascular exercise  Encouraged her also to schedule appointment to see weight management which I believe will be beneficial  Continue to monitor weight progress         Elevated fecal calprotectin     Previously with elevated fecal calprotectin level.  Significance of this is uncertain but possibly concerning for underlying IBD.  EGD and colonoscopy without any evidence of IBD.  Could have isolated small bowel disease.    Recheck fecal calprotectin level along with CRP  If still elevated, recommend small bowel evaluation with either CT enterography versus MR enterography versus video capsule endoscopy         Relevant Orders    C-reactive protein    Calprotectin,Fecal     Orders Placed This Encounter   Procedures    Calprotectin,Fecal     C-reactive protein       FOLLOW-UP: 3 months    HISTORY OF PRESENT ILLNESS       Shelley Tineo is a 39 y.o. female who presents to GI office for follow-up of GERD, elevated fecal calprotectin level, and diarrhea.  She continues to have reflux and diarrhea.  Underwent bidirectional endoscopy which was largely unremarkable.  Biopsies did not show any signs of H. pylori, celiac sprue, or IBD.  She continues to have diarrhea approximately 4-6 times per day on average.  Stools are typically loose but not liquid.  She denies any blood in her stool.  Sometimes associated with abdominal cramping when she feels that she does need to go to the bathroom.  Her pain is relieved with having bowel movement.  Denies any nocturnal symptoms.  She continues to have reflux and did recently have some worsening of her symptoms.  She has increased her omeprazole dose to double to help better control her symptoms.  She does admit that she does occasionally consume certain trigger foods and may eat later in the evening prior to bedtime.  She also reports that she was evaluated for wheezing by her PCP who told her that her symptoms could be due to reflux.  She was given an inhaler without any significant change in her symptoms.  She does admit that her symptoms tend to be worse in the evening or at bedtime.    She has gained some weight since last office visit which could certainly be contributing to her worsening reflux symptoms as well.  She informs me that she recently joined a gym as her daughter would also be doing this.  She has been referred to weight management in the past but has not seen them yet.  Offers no other acute GI complaints at this time.     ENDOSCOPIC HISTORY     UPPER ENDOSCOPY: 8/18/2023 with 5 fundic gland polyps in the stomach (confirmed with biopsies), remainder the stomach appeared normal, normal esophagus, normal duodenum (biopsies negative for H. pylori and celiac sprue)    COLONOSCOPY: 8/18/2023 with normal  appearance of the terminal ileum and entire colon (biopsies negative for IBD); recommend repeat in 10 years    REVIEW OF SYSTEMS     CONSTITUTIONAL: Denies any fever, chills, rigors, and weight loss  HEENT: No earache or tinnitus, denies hearing loss or visual disturbances  CARDIOVASCULAR: No chest pain or palpitations  RESPIRATORY: Denies any cough, hemoptysis, shortness of breath or dyspnea on exertion  GASTROINTESTINAL: As noted in the History of Present Illness  GENITOURINARY: No problems with urination, denies any hematuria or dysuria  NEUROLOGIC: No dizziness or vertigo, denies headaches   MUSCULOSKELETAL: Denies any muscle or joint pain   SKIN: Denies jaundice or itching  PSYCHOSOCIAL: Denies depression or anxiety, denies any recent memory loss     Answers submitted by the patient for this visit:  Abdominal Pain Questionnaire (Submitted on 2024)  Chief Complaint: Abdominal pain  anorexia: No  arthralgias: No  belching: No  constipation: No  diarrhea: No  dysuria: No  fever: No  flatus: No  frequency: No  headaches: Yes  hematochezia: No  hematuria: No  melena: No  myalgias: No  nausea: No  weight loss: No  vomiting: No  Aggravated by: bowel movement  Relieved by: bowel movements  Diagnostic workup: lower endoscopy, upper endoscopy    Historical Information   Past Medical History:   Diagnosis Date    GERD (gastroesophageal reflux disease)     MRSA infection     bladder/ urine     Past Surgical History:   Procedure Laterality Date     SECTION      EYE SURGERY      strabismus    TOOTH EXTRACTION       Social History   Social History     Substance and Sexual Activity   Alcohol Use Not Currently    Alcohol/week: 0.0 standard drinks of alcohol    Comment: rare     Social History     Substance and Sexual Activity   Drug Use Never     Social History     Tobacco Use   Smoking Status Never   Smokeless Tobacco Never     Family History   Problem Relation Age of Onset    COPD Mother     Colon polyps Mother   "   Diabetes Father     Colon polyps Father     No Known Problems Sister     No Known Problems Brother     No Known Problems Son     No Known Problems Daughter     No Known Problems Brother     No Known Problems Brother     No Known Problems Brother     No Known Problems Sister     Gallbladder disease Sister        MEDICATIONS & ALLERGIES     Current Outpatient Medications   Medication Instructions    Azelastine HCl 137 MCG/SPRAY SOLN 1 spray, Each Nare, 2 times daily    benzonatate (TESSALON) 200 mg, Oral, 3 times daily PRN    famotidine (PEPCID) 20 mg, Oral, Daily at bedtime    levonorgestrel (MIRENA) 20 MCG/24HR IUD 1 each, Intrauterine, Once    levothyroxine 75 mcg, Oral, Daily    omeprazole (PRILOSEC) 40 mg, Oral, Daily before lunch     Allergies   Allergen Reactions    Blackberry Flavor - Food Allergy Hives    Shellfish-Derived Products - Food Allergy     Tetanus Toxoid Hives and Edema       PHYSICAL EXAM      Objective   Blood pressure 122/78, pulse 86, temperature 98.2 °F (36.8 °C), resp. rate 18, height 5' 8\" (1.727 m), weight (!) 152 kg (334 lb), SpO2 99%. Body mass index is 50.78 kg/m².    General Appearance:   Alert, cooperative, no distress   HEENT:   Normocephalic, atraumatic, anicteric     Neck:   Supple, symmetrical, trachea midline   Lungs:   Equal chest rise, respirations unlabored    Heart:   Regular rate   Abdomen:   Soft, non-tender, non-distended; normal bowel sounds; no masses, no organomegaly    Rectal:   Deferred    Extremities:   No cyanosis or edema    Neuro:   Moves all 4 extremities    Skin:   No jaundice, rashes, or lesions      LABORATORY RESULTS     No visits with results within 1 Day(s) from this visit.   Latest known visit with results is:   Appointment on 01/25/2024   Component Date Value    TSH 3RD GENERATON 01/25/2024 3.858     Free T4 01/25/2024 0.74         IMAGING RESULTS     No results found.  I have personally reviewed any available and pertinent imaging study " reports.      Dejon Jha D.O.  [facility]  Division of Gastroenterology & Hepatology  Available on [employer]  [email or web address]    ** Please Note: This note is constructed using a voice recognition dictation system. **

## 2024-02-23 NOTE — ASSESSMENT & PLAN NOTE
Suspect she has some degree of underlying irritable bowel syndrome with diarrhea.  Has had symptoms for many years with more days throughout the week where she experiences symptoms than not.  She does have associated abdominal pain during episodes where she feels that she needs to go to the bathroom.  Symptoms are relieved with having bowel movement.  No nocturnal symptoms.  IBS would not explain the elevated fecal calprotectin and CRP levels which were previously seen.  The significance of this is uncertain as her biopsies from endoscopy and colonoscopy did not show any signs of IBD.  She could certainly have isolated small bowel disease although lower suspicion for this.  Possible SIBO as well although less likely without any bloating or flatulence.  May have certain sensitivity to caffeine as well.    We will check hydrogen/methane breath test to assess for underlying SIBO  If confirmed or if symptoms persist, recommend trial of rifaximin  Encourage patient to try to decrease caffeine intake  Could also consider small bowel evaluation if symptoms persist and above measures are not effective (CT versus MR enterography versus video capsule endoscopy)

## 2024-02-23 NOTE — ASSESSMENT & PLAN NOTE
Previously with elevated fecal calprotectin level.  Significance of this is uncertain but possibly concerning for underlying IBD.  EGD and colonoscopy without any evidence of IBD.  Could have isolated small bowel disease.    Recheck fecal calprotectin level along with CRP  If still elevated, recommend small bowel evaluation with either CT enterography versus MR enterography versus video capsule endoscopy

## 2024-02-23 NOTE — ASSESSMENT & PLAN NOTE
Continues to have symptoms.  EGD fairly unremarkable.  She also has history of wheezing which may be indicative of LPR.  Currently on omeprazole but has increased dose to better control her symptoms.  She does admit to eating certain trigger foods and potentially later in the evening which could exacerbate her symptoms.  Her weight will also exacerbate her reflux symptoms which was explained to her as well.    Recommend omeprazole 40 mg daily before lunch as she takes levothyroxine in the morning before breakfast  Start Pepcid 20 mg daily at bedtime  If Pepcid is ineffective, she may also increase omeprazole to twice daily  If this is ineffective, can consider alternate PPI versus possibly pH monitoring/Tim test off PPI to confirm GERD versus functional dyspepsia  Encouraged patient on weight loss which would also help improve her symptoms  Encourage lifestyle and dietary measures to minimize reflux symptoms

## 2024-02-23 NOTE — ASSESSMENT & PLAN NOTE
Current BMI 50.78.  Patient informs me that she has recently joined a gym for her daughter.  Has been referred to weight management in the past but has not seen them yet.    Encouraged her to make efforts at weight loss including watching her diet and increasing cardiovascular exercise  Encouraged her also to schedule appointment to see weight management which I believe will be beneficial  Continue to monitor weight progress

## 2024-02-23 NOTE — PATIENT INSTRUCTIONS
We will recheck blood work and stool test  Take the omeprazole 40 mg 30 minutes before lunch  We will start Pepcid 20 mg daily at bedtime  We will also check a breath test to rule out small intestinal bacterial overgrowth

## 2024-02-25 NOTE — PROGRESS NOTES
"Name: Shelley Tineo      : 1985      MRN: [id number]  Encounter Provider: Tamica Foster DO  Encounter Date: 3/1/2024   Encounter department: [facility] [place]    Assessment & Plan     1. Prediabetes    2. Hypothyroidism, unspecified type    3. Morbid obesity (HCC)    4. Chronic cough    5. Wheezing    6. Gastroesophageal reflux disease without esophagitis    7. Middle ear effusion, left     Chronic cough and wheezing possible ddx severe GERD- pt's GI is managing PPI trial  Chronic conditions otherwise stable  Pt also c/o left ear symptoms today - reports she had been seen at urgent care in January and antibiotic was rx'd- \"said there was fluid in there\" per pt - still feeling pressure, burning sensation, and \"always feels like something is in there\" - exam today still fluid level left TM - pt reports she has an ENT that she had seen in [place] about a year and a half ago after she perforated the left TM with a Qtip - she will call for appt       Subjective      Chief Complaint   Patient presents with    Follow-up     6 mo f/u       Scheduled f/u  C/o Still cold \"freezing\" -  \"and still can't sleep\"  \"The nasal spray's helping with the cough, though\" still coughs slightly once on awhile \"but it's the wheeze- as soon as I lay down at night\" - describes her GERD sx as some days very severe like a type of \"dinosaur that spits out venom\"  GI thought her wheezing and cough might also be related to GERD per pt, so rx'd higher dose PPI  Admits sits all day for work \"did join a gym\"  Did not yet see weight management due to medical bills for her and her family  Pulmonology eval also was ordered- not yet scheduled  Also c/o left ear symptoms - states, \"Went to urgent care in January and they gave me an antibiotic- said there was fluid in there - any time I blow my nose there's a lot of pressure in there and burning sensation\" and pulling down on ear relieves per pt \"always feels like something is in " "there\" - exam today still fluid level left TM - pt reports she has an ENT that she had seen in [place] about a year and a half ago after she perforated the left TM with a Qtip - she will call for appt      9/1/2023  [facility]  Assessment/Plan:  1. Hypothyroidism, unspecified type  Assessment & Plan:  Her lab values are, are slightly out of range and I am increasing her levothyroxine to 75 mcg once a day. She has 50 mcg tablets at home, and she will be taking 1.5 of those tablets until she runs through those and then I did e-scribe 75 mcg levothyroxine prescription to her pharmacy today to  when she runs out of the 50 mcg that she will be taking 1.5 tablets a day.  Orders:  -     levothyroxine 75 mcg tablet; Take 1 tablet (75 mcg total) by mouth daily  -     TSH, 3rd generation; Future; Expected date: 10/26/2023  -     T4, free; Future; Expected date: 10/26/2023  2. Morbid obesity (HCC)  -     Ambulatory Referral to Weight Management; Future  3. Elevated fasting glucose  Assessment & Plan:  Her hemoglobin A1C today definitely shows prediabetes, and I am putting in an order for medical weight management evaluation.  Orders:  -     POCT hemoglobin A1c  4. Prediabetes  -     Ambulatory Referral to Weight Management; Future  5. Low HDL (under 40)  Assessment & Plan:  The patient has already started a diet program and she will continue this, and we will just monitor her lipid profile.  6. Lymphadenopathy, cervical  Comments:  on thyroid US; order was placed previously to obtain f/u neck US, but pt has not yet obtained- states today she will schedule  Assessment & Plan:  This was seen on her thyroid ultrasound that was done in 07/2022. She is scheduled to obtain the head and neck ultrasound that was recommended as a follow-up by radiologist last year and we will reassess after this ultrasound has resulted. She does have very mild elevation of white blood cell count on labs that were done " "yesterday as well as labs from 2021 on chart review. So again, we will reassess after the repeat head and neck ultrasounds for the lymphadenopathy.  7. Lymphocytosis    Pt will obtain the repeat neck US that was ordered previously for the neck lymphadenopathy seen on thyroid US                  Review of Systems    Current Outpatient Medications on File Prior to Visit   Medication Sig    Azelastine HCl 137 MCG/SPRAY SOLN instill 1 spray into each nostril twice a day    famotidine (PEPCID) 20 mg tablet Take 1 tablet (20 mg total) by mouth daily at bedtime    levonorgestrel (MIRENA) 20 MCG/24HR IUD 1 each by Intrauterine route once    levothyroxine 75 mcg tablet swallow 1 tablet once daily    omeprazole (PriLOSEC) 40 MG capsule Take 1 capsule (40 mg total) by mouth daily before lunch       Objective     /70 (BP Location: Left arm, Patient Position: Sitting, Cuff Size: Standard)   Pulse 74   Temp (!) 97 °F (36.1 °C) (Tympanic)   Ht 5' 8\" (1.727 m)   Wt (!) 152 kg (335 lb)   SpO2 99%   BMI 50.94 kg/m²     Physical Exam  Vitals and nursing note reviewed.   Constitutional:       General: She is not in acute distress.     Appearance: She is well-groomed. She is morbidly obese. She is not ill-appearing, toxic-appearing or diaphoretic.   HENT:      Head: Normocephalic and atraumatic.      Right Ear: Tympanic membrane, ear canal and external ear normal.      Left Ear: Ear canal and external ear normal. No tenderness. A middle ear effusion (small fluid level visible) is present. Tympanic membrane is not injected, erythematous, retracted or bulging.      Nose: Nose normal.      Mouth/Throat:      Lips: Pink.      Mouth: Mucous membranes are moist.      Pharynx: Oropharynx is clear. Uvula midline.   Neck:      Trachea: Phonation normal.   Cardiovascular:      Rate and Rhythm: Normal rate and regular rhythm.      Heart sounds: Normal heart sounds.   Pulmonary:      Effort: Pulmonary effort is normal.      Breath " sounds: Normal breath sounds and air entry.   Skin:     General: Skin is warm and dry.      Coloration: Skin is not pale.   Neurological:      Mental Status: She is alert and oriented to person, place, and time.   Psychiatric:         Behavior: Behavior is cooperative.       Tamica Foster DO

## 2024-03-01 ENCOUNTER — OFFICE VISIT (OUTPATIENT)
Dept: FAMILY MEDICINE CLINIC | Facility: CLINIC | Age: 39
End: 2024-03-01
Payer: COMMERCIAL

## 2024-03-01 VITALS
OXYGEN SATURATION: 99 % | TEMPERATURE: 97 F | HEIGHT: 68 IN | SYSTOLIC BLOOD PRESSURE: 130 MMHG | HEART RATE: 74 BPM | WEIGHT: 293 LBS | BODY MASS INDEX: 44.41 KG/M2 | DIASTOLIC BLOOD PRESSURE: 70 MMHG

## 2024-03-01 DIAGNOSIS — E66.01 MORBID OBESITY (HCC): ICD-10-CM

## 2024-03-01 DIAGNOSIS — E03.9 HYPOTHYROIDISM, UNSPECIFIED TYPE: ICD-10-CM

## 2024-03-01 DIAGNOSIS — H65.92 MIDDLE EAR EFFUSION, LEFT: ICD-10-CM

## 2024-03-01 DIAGNOSIS — R05.3 CHRONIC COUGH: ICD-10-CM

## 2024-03-01 DIAGNOSIS — R06.2 WHEEZING: ICD-10-CM

## 2024-03-01 DIAGNOSIS — R73.03 PREDIABETES: Primary | ICD-10-CM

## 2024-03-01 DIAGNOSIS — K21.9 GASTROESOPHAGEAL REFLUX DISEASE WITHOUT ESOPHAGITIS: ICD-10-CM

## 2024-03-01 PROCEDURE — 99214 OFFICE O/P EST MOD 30 MIN: CPT | Performed by: FAMILY MEDICINE

## 2024-05-24 DIAGNOSIS — K21.9 GASTROESOPHAGEAL REFLUX DISEASE WITHOUT ESOPHAGITIS: ICD-10-CM

## 2024-05-24 RX ORDER — FAMOTIDINE 20 MG/1
20 TABLET, FILM COATED ORAL
Qty: 30 TABLET | Refills: 2 | Status: SHIPPED | OUTPATIENT
Start: 2024-05-24

## 2024-07-12 ENCOUNTER — OFFICE VISIT (OUTPATIENT)
Age: 39
End: 2024-07-12
Payer: COMMERCIAL

## 2024-07-12 VITALS
HEIGHT: 68 IN | TEMPERATURE: 97.9 F | DIASTOLIC BLOOD PRESSURE: 80 MMHG | BODY MASS INDEX: 44.41 KG/M2 | HEART RATE: 88 BPM | OXYGEN SATURATION: 99 % | WEIGHT: 293 LBS | SYSTOLIC BLOOD PRESSURE: 138 MMHG

## 2024-07-12 DIAGNOSIS — K21.9 GASTROESOPHAGEAL REFLUX DISEASE WITHOUT ESOPHAGITIS: Primary | ICD-10-CM

## 2024-07-12 DIAGNOSIS — K58.0 IRRITABLE BOWEL SYNDROME WITH DIARRHEA: ICD-10-CM

## 2024-07-12 PROCEDURE — 99214 OFFICE O/P EST MOD 30 MIN: CPT | Performed by: STUDENT IN AN ORGANIZED HEALTH CARE EDUCATION/TRAINING PROGRAM

## 2024-07-12 RX ORDER — OMEPRAZOLE 40 MG/1
40 CAPSULE, DELAYED RELEASE ORAL
Qty: 90 CAPSULE | Refills: 1 | Status: SHIPPED | OUTPATIENT
Start: 2024-07-12

## 2024-07-12 NOTE — PROGRESS NOTES
[facility]  Outpatient Follow-up  Encounter: [id number]    PATIENT INFO     Name: Shelley Tineo  YOB: 1985   Age: 39 y.o.   Sex: female   MRN: [id number]    ASSESSMENT & PLAN     Shelley Tineo is a 39 y.o. female with history of GERD, IBS with diarrhea, elevated fecal calprotectin, hypothyroidism presents to GI office for follow-up.    Problem List Items Addressed This Visit       GERD (gastroesophageal reflux disease) - Primary     Fortunately, symptoms are improved with the addition of famotidine in the evening.  EGD was largely unremarkable.    Continue omeprazole 40 mg daily before lunch  We will send refill for this medication per her request  Continue famotidine 20 mg daily at bedtime  Advised patient to avoid trigger foods         Relevant Medications    omeprazole (PriLOSEC) 40 MG capsule    Irritable bowel syndrome with diarrhea     Likely has some degree of underlying irritable bowel syndrome with diarrhea.  She has had symptoms for many years.  Fortunately, it appears that her symptoms have improved somewhat since her last office visit.  This may be related to dietary factors as she has been trying to eat out less.  She has not yet completed hydrogen breath test but does intend to complete this.    I have encouraged her to follow dietary changes which seems to be helping  She may complete the hydrogen breath test at her earliest convenience          No orders of the defined types were placed in this encounter.      FOLLOW-UP: 6 months    HISTORY OF PRESENT ILLNESS       Shelley Tineo is a 39 y.o. female who presents to GI office for follow-up.  Fortunately, she appears to be doing very well since her last office visit.  She previously had complaints of uncontrolled reflux symptoms typically in the evenings or early mornings.  Since starting famotidine 20 mg daily at bedtime, her symptoms are markedly improved.  She denies any overt or breakthrough heartburn  symptoms.  Denies any dysphagia or odynophagia.  She is very pleased with this.    Additionally, her IBS symptoms appear to be improved as well.  She has not yet completed hydrogen breath test but does intend to complete this eventually.  She admits that she has been trying to change her diet and is trying to eat out less.  She thinks that this may be helping.  She is also working to identify trigger foods which may exacerbate her symptoms.  She reports that she had freshly cooked French fries during 1 outing with her family and that this almost immediately resulted in symptoms.  She is otherwise feeling better.  Offers no other acute GI complaints at this time.     ENDOSCOPIC HISTORY     UPPER ENDOSCOPY: 8/18/2023 with 5 fundic gland polyps in the stomach (confirmed with biopsies), remainder the stomach appeared normal, normal esophagus, normal duodenum (biopsies negative for H. pylori and celiac sprue)     COLONOSCOPY: 8/18/2023 with normal appearance of the terminal ileum and entire colon (biopsies negative for IBD); recommend repeat in 10 years    REVIEW OF SYSTEMS     CONSTITUTIONAL: Denies any fever, chills, rigors, and weight loss  HEENT: No earache or tinnitus, denies hearing loss or visual disturbances  CARDIOVASCULAR: No chest pain or palpitations  RESPIRATORY: Denies any cough, hemoptysis, shortness of breath or dyspnea on exertion  GASTROINTESTINAL: As noted in the History of Present Illness  GENITOURINARY: No problems with urination, denies any hematuria or dysuria  NEUROLOGIC: No dizziness or vertigo, denies headaches   MUSCULOSKELETAL: Denies any muscle or joint pain   SKIN: Denies jaundice or itching  PSYCHOSOCIAL: Denies depression or anxiety, denies any recent memory loss     Answers submitted by the patient for this visit:  Abdominal Pain Questionnaire (Submitted on 7/11/2024)  Chief Complaint: Abdominal pain  anorexia: No  arthralgias: No  belching: No  constipation: No  diarrhea: No  dysuria:  "No  fever: No  flatus: No  frequency: No  headaches: Yes  hematochezia: No  hematuria: No  melena: No  myalgias: No  nausea: No  weight loss: No  vomiting: No  Aggravated by: nothing  Relieved by: nothing  Diagnostic workup: lower endoscopy, upper endoscopy    Historical Information   Past Medical History:   Diagnosis Date    GERD (gastroesophageal reflux disease)     MRSA infection     bladder/ urine     Past Surgical History:   Procedure Laterality Date     SECTION      EYE SURGERY      strabismus    TOOTH EXTRACTION       Social History   Social History     Substance and Sexual Activity   Alcohol Use Not Currently    Alcohol/week: 0.0 standard drinks of alcohol    Comment: rare     Social History     Substance and Sexual Activity   Drug Use Never     Social History     Tobacco Use   Smoking Status Never   Smokeless Tobacco Never     Family History   Problem Relation Age of Onset    COPD Mother     Colon polyps Mother     Diabetes Father     Colon polyps Father     No Known Problems Sister     No Known Problems Brother     No Known Problems Son     No Known Problems Daughter     No Known Problems Brother     No Known Problems Brother     No Known Problems Brother     No Known Problems Sister     Gallbladder disease Sister        MEDICATIONS & ALLERGIES     Current Outpatient Medications   Medication Instructions    Azelastine HCl 137 MCG/SPRAY SOLN 1 spray, Each Nare, 2 times daily    famotidine (PEPCID) 20 mg, Oral, Daily at bedtime    levonorgestrel (MIRENA) 20 MCG/24HR IUD 1 each, Intrauterine, Once    levothyroxine 75 mcg, Oral, Daily    omeprazole (PRILOSEC) 40 mg, Oral, Daily before lunch     Allergies   Allergen Reactions    Blackberry Flavor - Food Allergy Hives    Shellfish-Derived Products - Food Allergy     Tetanus Toxoid Hives and Edema       PHYSICAL EXAM      Objective   Blood pressure 138/80, pulse 88, temperature 97.9 °F (36.6 °C), temperature source Temporal, height 5' 8\" (1.727 m), weight " (!) 152 kg (335 lb), SpO2 99%. Body mass index is 50.94 kg/m².    General Appearance:   Alert, cooperative, no distress   HEENT:   Normocephalic, atraumatic, anicteric     Neck:   Supple, symmetrical, trachea midline   Lungs:   Equal chest rise, respirations unlabored    Heart:   Regular rate   Abdomen:   Soft, non-tender, non-distended; normal bowel sounds; no masses, no organomegaly    Rectal:   Deferred    Extremities:   No cyanosis or edema    Neuro:   Moves all 4 extremities    Skin:   No jaundice, rashes, or lesions      LABORATORY RESULTS     No visits with results within 1 Day(s) from this visit.   Latest known visit with results is:   Appointment on 01/25/2024   Component Date Value    TSH 3RD GENERATON 01/25/2024 3.858     Free T4 01/25/2024 0.74         IMAGING RESULTS     No results found.  I have personally reviewed any available and pertinent imaging study reports.      Dejon Jha D.O.  [facility]  Division of Gastroenterology & Hepatology  Available on [employer]  [email or web address]    ** Please Note: This note is constructed using a voice recognition dictation system. **

## 2024-07-12 NOTE — ASSESSMENT & PLAN NOTE
Fortunately, symptoms are improved with the addition of famotidine in the evening.  EGD was largely unremarkable.    Continue omeprazole 40 mg daily before lunch  We will send refill for this medication per her request  Continue famotidine 20 mg daily at bedtime  Advised patient to avoid trigger foods

## 2024-07-12 NOTE — ASSESSMENT & PLAN NOTE
Likely has some degree of underlying irritable bowel syndrome with diarrhea.  She has had symptoms for many years.  Fortunately, it appears that her symptoms have improved somewhat since her last office visit.  This may be related to dietary factors as she has been trying to eat out less.  She has not yet completed hydrogen breath test but does intend to complete this.    I have encouraged her to follow dietary changes which seems to be helping  She may complete the hydrogen breath test at her earliest convenience

## 2024-08-11 DIAGNOSIS — E03.9 HYPOTHYROIDISM, UNSPECIFIED TYPE: ICD-10-CM

## 2024-08-11 DIAGNOSIS — J34.89 NASAL DRAINAGE: ICD-10-CM

## 2024-08-11 DIAGNOSIS — K21.9 GASTROESOPHAGEAL REFLUX DISEASE WITHOUT ESOPHAGITIS: ICD-10-CM

## 2024-08-11 DIAGNOSIS — R05.3 CHRONIC COUGH: ICD-10-CM

## 2024-08-11 RX ORDER — AZELASTINE HYDROCHLORIDE 137 UG/1
1 SPRAY, METERED NASAL 2 TIMES DAILY
Qty: 30 ML | Refills: 0 | Status: SHIPPED | OUTPATIENT
Start: 2024-08-11

## 2024-08-11 RX ORDER — LEVOTHYROXINE SODIUM 75 UG/1
75 TABLET ORAL DAILY
Qty: 90 TABLET | Refills: 1 | Status: SHIPPED | OUTPATIENT
Start: 2024-08-11

## 2024-08-11 RX ORDER — FAMOTIDINE 20 MG/1
20 TABLET, FILM COATED ORAL
Qty: 30 TABLET | Refills: 2 | Status: SHIPPED | OUTPATIENT
Start: 2024-08-11

## 2024-09-06 ENCOUNTER — OFFICE VISIT (OUTPATIENT)
Dept: FAMILY MEDICINE CLINIC | Facility: CLINIC | Age: 39
End: 2024-09-06
Payer: COMMERCIAL

## 2024-09-06 VITALS
DIASTOLIC BLOOD PRESSURE: 90 MMHG | TEMPERATURE: 97.3 F | OXYGEN SATURATION: 99 % | HEART RATE: 88 BPM | WEIGHT: 293 LBS | SYSTOLIC BLOOD PRESSURE: 130 MMHG | HEIGHT: 68 IN | BODY MASS INDEX: 44.41 KG/M2 | RESPIRATION RATE: 16 BRPM

## 2024-09-06 DIAGNOSIS — G89.29 CHRONIC PAIN OF BOTH KNEES: ICD-10-CM

## 2024-09-06 DIAGNOSIS — E03.9 HYPOTHYROIDISM, UNSPECIFIED TYPE: ICD-10-CM

## 2024-09-06 DIAGNOSIS — Z00.00 ANNUAL PHYSICAL EXAM: Primary | ICD-10-CM

## 2024-09-06 DIAGNOSIS — M25.561 CHRONIC PAIN OF BOTH KNEES: ICD-10-CM

## 2024-09-06 DIAGNOSIS — Z79.899 ENCOUNTER FOR LONG-TERM (CURRENT) USE OF MEDICATIONS: ICD-10-CM

## 2024-09-06 DIAGNOSIS — E66.01 MORBID OBESITY (HCC): ICD-10-CM

## 2024-09-06 DIAGNOSIS — R06.2 WHEEZING: ICD-10-CM

## 2024-09-06 DIAGNOSIS — Z13.21 ENCOUNTER FOR VITAMIN DEFICIENCY SCREENING: ICD-10-CM

## 2024-09-06 DIAGNOSIS — R51.9 NEW ONSET OF HEADACHES: ICD-10-CM

## 2024-09-06 DIAGNOSIS — E78.6 LOW HDL (UNDER 40): ICD-10-CM

## 2024-09-06 DIAGNOSIS — R73.03 PREDIABETES: ICD-10-CM

## 2024-09-06 DIAGNOSIS — U09.9 POST-ACUTE SEQUELAE OF COVID-19 (PASC): ICD-10-CM

## 2024-09-06 DIAGNOSIS — Z79.899 LONG-TERM CURRENT USE OF PROTON PUMP INHIBITOR THERAPY: ICD-10-CM

## 2024-09-06 DIAGNOSIS — R03.0 ELEVATED BLOOD PRESSURE READING IN OFFICE WITHOUT DIAGNOSIS OF HYPERTENSION: ICD-10-CM

## 2024-09-06 DIAGNOSIS — Z13.0 SCREENING FOR DEFICIENCY ANEMIA: ICD-10-CM

## 2024-09-06 DIAGNOSIS — M25.562 CHRONIC PAIN OF BOTH KNEES: ICD-10-CM

## 2024-09-06 PROBLEM — R05.3 CHRONIC COUGH: Status: RESOLVED | Noted: 2023-10-13 | Resolved: 2024-09-06

## 2024-09-06 PROCEDURE — 99395 PREV VISIT EST AGE 18-39: CPT | Performed by: FAMILY MEDICINE

## 2024-09-06 PROCEDURE — 99214 OFFICE O/P EST MOD 30 MIN: CPT | Performed by: FAMILY MEDICINE

## 2024-09-06 NOTE — PROGRESS NOTES
Adult Annual Physical  Name: Shelley Tineo      : 1985      MRN: [id number]  Encounter Provider: Tamica Foster DO  Encounter Date: 2024   Encounter department: [facility] [place]    Assessment & Plan   1. Annual physical exam  2. Elevated blood pressure reading in office without diagnosis of hypertension  -     Comprehensive metabolic panel; Future  -     Albumin / creatinine urine ratio; Future  3. New onset of headaches  4. Wheezing  -     Ambulatory Referral to Pulmonology; Future  5. Post-acute sequelae of COVID-19 (PASC)  -     Ambulatory Referral to Pulmonology; Future  6. Long-term current use of proton pump inhibitor therapy  7. Prediabetes  -     Comprehensive metabolic panel; Future  -     Ambulatory Referral to Weight Management; Future  -     Albumin / creatinine urine ratio; Future  -     Hemoglobin A1C; Future  8. Hypothyroidism, unspecified type  -     TSH, 3rd generation with Free T4 reflex; Future  -     T4, free; Future  9. Chronic pain of both knees  -     Ambulatory Referral to Physical Therapy; Future  10. Morbid obesity (HCC)  -     Lipid Panel with Direct LDL reflex; Future  -     Vitamin D 25 hydroxy; Future  -     Ambulatory Referral to Weight Management; Future  11. Low HDL (under 40)  -     Lipid Panel with Direct LDL reflex; Future  12. Screening for deficiency anemia  -     CBC and differential; Future  13. Encounter for vitamin deficiency screening  -     Vitamin D 25 hydroxy; Future  14. Encounter for long-term (current) use of medications  -     Vitamin D 25 hydroxy; Future    States her GI agreed that her chronic wheezing could be from uncontrolled GERD, so PPI dose was doubled and Pepcid was added about 2 months ago and working well with respect to stomach sx, but wheezing is unchanged per pt - I have ordered pulmonology consult  I advised pt to schedule appt for eye exam to r/o vision change as cause of her new headaches (last eye doctor exam was 2= yrs  "ago per pt) - and to cut back sodium intake to maximum 3GM per day - will monitor BP and HA sx - to ER if worsens    Immunizations and preventive care screenings were discussed with patient today. Appropriate education was printed on patient's after visit summary.    Counseling:  Exercise: the importance of regular exercise/physical activity was discussed. Recommend exercise 3-5 times per week for at least 30 minutes.          History of Present Illness     Adult Annual Physical:  Patient presents for annual physical. Late start to appt today due to previous pt having been booked in [facility] spot for a well child annual visit  Also c/o getting headaches back of head around to front and take excedrin migraine and then only is in the front   Also c/o \"both knees grind and hurt all the time\" \"ache so bad\" \"hard to go up steps, getting a lot worse past 7-8 months\" ongoing for many years  Also c/o heat and cold intolerance  Also states order for weight management last September \"nobody ever reached out\".     Diet and Physical Activity:  - Diet/Nutrition: low fat diet.  - Exercise: no formal exercise.    Depression Screening:  - PHQ-2 Score: 0    General Health:    - Hearing: normal hearing bilateral ears.  - Vision: most recent eye exam > 1 year ago.    /GYN Health:  - Follows with GYN: yes.     Review of Systems   All other systems reviewed and are negative.        Objective     /90 (BP Location: Left arm, Patient Position: Sitting, Cuff Size: Large)   Pulse 88   Temp (!) 97.3 °F (36.3 °C) (Tympanic)   Resp 16   Ht 5' 8\" (1.727 m)   Wt (!) 154 kg (339 lb)   SpO2 99%   BMI 51.54 kg/m²     Physical Exam  Vitals and nursing note reviewed.   Constitutional:       General: She is not in acute distress.     Appearance: She is well-groomed. She is morbidly obese. She is not ill-appearing, toxic-appearing or diaphoretic.      Comments: Very pleasant as always   HENT:      Head: Normocephalic and atraumatic.      Right " Ear: Tympanic membrane, ear canal and external ear normal.      Left Ear: Tympanic membrane, ear canal and external ear normal.      Nose: Nose normal.      Mouth/Throat:      Lips: Pink.      Mouth: Mucous membranes are moist.      Pharynx: Oropharynx is clear. Uvula midline.      Tonsils: 0 on the right. 0 on the left.   Eyes:      General: Lids are normal.      Extraocular Movements: Extraocular movements intact.      Conjunctiva/sclera: Conjunctivae normal.      Pupils: Pupils are equal, round, and reactive to light.   Neck:      Thyroid: No thyroid mass, thyromegaly or thyroid tenderness.      Vascular: No JVD.      Trachea: Trachea and phonation normal.   Cardiovascular:      Rate and Rhythm: Normal rate and regular rhythm.      Pulses: Normal pulses.      Heart sounds: Normal heart sounds.   Pulmonary:      Effort: Pulmonary effort is normal.      Breath sounds: Normal air entry. No stridor, decreased air movement or transmitted upper airway sounds. No decreased breath sounds, rhonchi or rales. Wheezes: faint exp wheezes t/o.  Abdominal:      General: Bowel sounds are normal. There is no distension or abdominal bruit.      Palpations: Abdomen is soft. There is no hepatomegaly, splenomegaly or mass.      Tenderness: There is no abdominal tenderness.      Hernia: There is no hernia in the ventral area.      Comments: Very large abd girth   Musculoskeletal:      Cervical back: Neck supple.      Right lower leg: No edema.      Left lower leg: No edema.   Lymphadenopathy:      Cervical: No cervical adenopathy.   Skin:     General: Skin is warm and dry.      Capillary Refill: Capillary refill takes less than 2 seconds.      Coloration: Skin is not pale.   Neurological:      Mental Status: She is alert and oriented to person, place, and time.      Cranial Nerves: Cranial nerves 2-12 are intact.      Sensory: Sensation is intact.      Motor: Motor function is intact.      Coordination: Coordination is intact.       Gait: Gait normal.      Deep Tendon Reflexes: Reflexes are normal and symmetric.   Psychiatric:         Mood and Affect: Mood normal.         Behavior: Behavior normal. Behavior is cooperative.         Cognition and Memory: Cognition and memory normal.

## 2024-09-06 NOTE — PATIENT INSTRUCTIONS
"Patient Education     Routine physical for adults   The Basics   Written by the doctors and editors at [facility]   What is a physical? -- A physical is a routine visit, or \"check-up,\" with your doctor. You might also hear it called a \"wellness visit\" or \"preventive visit.\"  During each visit, the doctor will:   Ask about your physical and mental health   Ask about your habits, behaviors, and lifestyle   Do an exam   Give you vaccines if needed   Talk to you about any medicines you take   Give advice about your health   Answer your questions  Getting regular check-ups is an important part of taking care of your health. It can help your doctor find and treat any problems you have. But it's also important for preventing health problems.  A routine physical is different from a \"sick visit.\" A sick visit is when you see a doctor because of a health concern or problem. Since physicals are scheduled ahead of time, you can think about what you want to ask the doctor.  How often should I get a physical? -- It depends on your age and health. In general, for people age 21 years and older:   If you are younger than 50 years, you might be able to get a physical every 3 years.   If you are 50 years or older, your doctor might recommend a physical every year.  If you have an ongoing health condition, like diabetes or high blood pressure, your doctor will probably want to see you more often.  What happens during a physical? -- In general, each visit will include:   Physical exam - The doctor or nurse will check your height, weight, heart rate, and blood pressure. They will also look at your eyes and ears. They will ask about how you are feeling and whether you have any symptoms that bother you.   Medicines - It's a good idea to bring a list of all the medicines you take to each doctor visit. Your doctor will talk to you about your medicines and answer any questions. Tell them if you are having any side effects that bother you. You " "should also tell them if you are having trouble paying for any of your medicines.   Habits and behaviors - This includes:   Your diet   Your exercise habits   Whether you smoke, drink alcohol, or use drugs   Whether you are sexually active   Whether you feel safe at home  Your doctor will talk to you about things you can do to improve your health and lower your risk of health problems. They will also offer help and support. For example, if you want to quit smoking, they can give you advice and might prescribe medicines. If you want to improve your diet or get more physical activity, they can help you with this, too.   Lab tests, if needed - The tests you get will depend on your age and situation. For example, your doctor might want to check your:   Cholesterol   Blood sugar   Iron level   Vaccines - The recommended vaccines will depend on your age, health, and what vaccines you already had. Vaccines are very important because they can prevent certain serious or deadly infections.   Discussion of screening - \"Screening\" means checking for diseases or other health problems before they cause symptoms. Your doctor can recommend screening based on your age, risk, and preferences. This might include tests to check for:   Cancer, such as breast, prostate, cervical, ovarian, colorectal, prostate, lung, or skin cancer   Sexually transmitted infections, such as chlamydia and gonorrhea   Mental health conditions like depression and anxiety  Your doctor will talk to you about the different types of screening tests. They can help you decide which screenings to have. They can also explain what the results might mean.   Answering questions - The physical is a good time to ask the doctor or nurse questions about your health. If needed, they can refer you to other doctors or specialists, too.  Adults older than 65 years often need other care, too. As you get older, your doctor will talk to you about:   How to prevent falling at " home   Hearing or vision tests   Memory testing   How to take your medicines safely   Making sure that you have the help and support you need at home  All topics are updated as new evidence becomes available and our peer review process is complete.  This topic retrieved from [employer] on: May 02, 2024.  Topic 978552 Version 1.0  Release: 32.4.3 - C32.122  © 2024 UpToDate, Inc. and/or its affiliates. All rights reserved.  Consumer Information Use and Disclaimer   Disclaimer: This generalized information is a limited summary of diagnosis, treatment, and/or medication information. It is not meant to be comprehensive and should be used as a tool to help the user understand and/or assess potential diagnostic and treatment options. It does NOT include all information about conditions, treatments, medications, side effects, or risks that may apply to a specific patient. It is not intended to be medical advice or a substitute for the medical advice, diagnosis, or treatment of a health care provider based on the health care provider's examination and assessment of a patient's specific and unique circumstances. Patients must speak with a health care provider for complete information about their health, medical questions, and treatment options, including any risks or benefits regarding use of medications. This information does not endorse any treatments or medications as safe, effective, or approved for treating a specific patient. UpToDate, Inc. and its affiliates disclaim any warranty or liability relating to this information or the use thereof.The use of this information is governed by the Terms of Use, available at https://www.woltersLuminateuwer.com/en/know/clinical-effectiveness-terms. 2024© UpToDate, Inc. and its affiliates and/or licensors. All rights reserved.  Copyright   © 2024 UpToDate, Inc. and/or its affiliates. All rights reserved.

## 2024-09-08 PROBLEM — M25.562 CHRONIC PAIN OF BOTH KNEES: Status: ACTIVE | Noted: 2024-09-08

## 2024-09-08 PROBLEM — M25.561 CHRONIC PAIN OF BOTH KNEES: Status: ACTIVE | Noted: 2024-09-08

## 2024-09-08 PROBLEM — G89.29 CHRONIC PAIN OF BOTH KNEES: Status: ACTIVE | Noted: 2024-09-08

## 2024-09-08 PROBLEM — R51.9 NEW ONSET OF HEADACHES: Status: ACTIVE | Noted: 2024-09-08

## 2024-09-10 ENCOUNTER — APPOINTMENT (OUTPATIENT)
Dept: LAB | Facility: CLINIC | Age: 39
End: 2024-09-10
Payer: COMMERCIAL

## 2024-09-10 DIAGNOSIS — Z13.21 ENCOUNTER FOR VITAMIN DEFICIENCY SCREENING: ICD-10-CM

## 2024-09-10 DIAGNOSIS — E66.01 MORBID OBESITY (HCC): ICD-10-CM

## 2024-09-10 DIAGNOSIS — E03.9 HYPOTHYROIDISM, UNSPECIFIED TYPE: ICD-10-CM

## 2024-09-10 DIAGNOSIS — R19.5 ELEVATED FECAL CALPROTECTIN: ICD-10-CM

## 2024-09-10 DIAGNOSIS — E78.6 LOW HDL (UNDER 40): ICD-10-CM

## 2024-09-10 DIAGNOSIS — R03.0 ELEVATED BLOOD PRESSURE READING IN OFFICE WITHOUT DIAGNOSIS OF HYPERTENSION: ICD-10-CM

## 2024-09-10 DIAGNOSIS — R73.03 PREDIABETES: ICD-10-CM

## 2024-09-10 DIAGNOSIS — Z79.899 ENCOUNTER FOR LONG-TERM (CURRENT) USE OF MEDICATIONS: ICD-10-CM

## 2024-09-10 DIAGNOSIS — K58.0 IRRITABLE BOWEL SYNDROME WITH DIARRHEA: ICD-10-CM

## 2024-09-10 DIAGNOSIS — Z13.0 SCREENING FOR DEFICIENCY ANEMIA: ICD-10-CM

## 2024-09-10 LAB
25(OH)D3 SERPL-MCNC: 22.5 NG/ML (ref 30–100)
ALBUMIN SERPL BCG-MCNC: 4 G/DL (ref 3.5–5)
ALP SERPL-CCNC: 117 U/L (ref 34–104)
ALT SERPL W P-5'-P-CCNC: 14 U/L (ref 7–52)
ANION GAP SERPL CALCULATED.3IONS-SCNC: 12 MMOL/L (ref 4–13)
AST SERPL W P-5'-P-CCNC: 17 U/L (ref 13–39)
BASOPHILS # BLD AUTO: 0.08 THOUSANDS/ÂΜL (ref 0–0.1)
BASOPHILS NFR BLD AUTO: 1 % (ref 0–1)
BILIRUB SERPL-MCNC: 0.37 MG/DL (ref 0.2–1)
BUN SERPL-MCNC: 12 MG/DL (ref 5–25)
CALCIUM SERPL-MCNC: 9.2 MG/DL (ref 8.4–10.2)
CHLORIDE SERPL-SCNC: 101 MMOL/L (ref 96–108)
CHOLEST SERPL-MCNC: 161 MG/DL
CO2 SERPL-SCNC: 23 MMOL/L (ref 21–32)
CREAT SERPL-MCNC: 0.75 MG/DL (ref 0.6–1.3)
CREAT UR-MCNC: 100.4 MG/DL
CRP SERPL QL: 25.6 MG/L
EOSINOPHIL # BLD AUTO: 0.28 THOUSAND/ÂΜL (ref 0–0.61)
EOSINOPHIL NFR BLD AUTO: 3 % (ref 0–6)
ERYTHROCYTE [DISTWIDTH] IN BLOOD BY AUTOMATED COUNT: 14.3 % (ref 11.6–15.1)
GFR SERPL CREATININE-BSD FRML MDRD: 102 ML/MIN/1.73SQ M
GLUCOSE P FAST SERPL-MCNC: 103 MG/DL (ref 65–99)
HCT VFR BLD AUTO: 36.8 % (ref 34.8–46.1)
HDLC SERPL-MCNC: 41 MG/DL
HGB BLD-MCNC: 11.9 G/DL (ref 11.5–15.4)
IMM GRANULOCYTES # BLD AUTO: 0.04 THOUSAND/UL (ref 0–0.2)
IMM GRANULOCYTES NFR BLD AUTO: 0 % (ref 0–2)
LDLC SERPL CALC-MCNC: 99 MG/DL (ref 0–100)
LYMPHOCYTES # BLD AUTO: 3.1 THOUSANDS/ÂΜL (ref 0.6–4.47)
LYMPHOCYTES NFR BLD AUTO: 32 % (ref 14–44)
MCH RBC QN AUTO: 25.5 PG (ref 26.8–34.3)
MCHC RBC AUTO-ENTMCNC: 32.3 G/DL (ref 31.4–37.4)
MCV RBC AUTO: 79 FL (ref 82–98)
MICROALBUMIN UR-MCNC: 168.4 MG/L
MICROALBUMIN/CREAT 24H UR: 168 MG/G CREATININE (ref 0–30)
MONOCYTES # BLD AUTO: 0.5 THOUSAND/ÂΜL (ref 0.17–1.22)
MONOCYTES NFR BLD AUTO: 5 % (ref 4–12)
NEUTROPHILS # BLD AUTO: 5.66 THOUSANDS/ÂΜL (ref 1.85–7.62)
NEUTS SEG NFR BLD AUTO: 59 % (ref 43–75)
NRBC BLD AUTO-RTO: 0 /100 WBCS
PLATELET # BLD AUTO: 371 THOUSANDS/UL (ref 149–390)
PMV BLD AUTO: 9.5 FL (ref 8.9–12.7)
POTASSIUM SERPL-SCNC: 4.3 MMOL/L (ref 3.5–5.3)
PROT SERPL-MCNC: 7.6 G/DL (ref 6.4–8.4)
RBC # BLD AUTO: 4.67 MILLION/UL (ref 3.81–5.12)
SODIUM SERPL-SCNC: 136 MMOL/L (ref 135–147)
T4 FREE SERPL-MCNC: 0.9 NG/DL (ref 0.61–1.12)
TRIGL SERPL-MCNC: 105 MG/DL
TSH SERPL DL<=0.05 MIU/L-ACNC: 4.05 UIU/ML (ref 0.45–4.5)
WBC # BLD AUTO: 9.66 THOUSAND/UL (ref 4.31–10.16)

## 2024-09-10 PROCEDURE — 83036 HEMOGLOBIN GLYCOSYLATED A1C: CPT

## 2024-09-10 PROCEDURE — 36415 COLL VENOUS BLD VENIPUNCTURE: CPT

## 2024-09-10 PROCEDURE — 80061 LIPID PANEL: CPT

## 2024-09-10 PROCEDURE — 82306 VITAMIN D 25 HYDROXY: CPT

## 2024-09-10 PROCEDURE — 80053 COMPREHEN METABOLIC PANEL: CPT

## 2024-09-10 PROCEDURE — 85025 COMPLETE CBC W/AUTO DIFF WBC: CPT

## 2024-09-10 PROCEDURE — 86140 C-REACTIVE PROTEIN: CPT

## 2024-09-10 PROCEDURE — 82570 ASSAY OF URINE CREATININE: CPT

## 2024-09-10 PROCEDURE — 84443 ASSAY THYROID STIM HORMONE: CPT

## 2024-09-10 PROCEDURE — 84439 ASSAY OF FREE THYROXINE: CPT

## 2024-09-10 PROCEDURE — 82043 UR ALBUMIN QUANTITATIVE: CPT

## 2024-09-11 LAB
EST. AVERAGE GLUCOSE BLD GHB EST-MCNC: 140 MG/DL
HBA1C MFR BLD: 6.5 %

## 2024-09-12 DIAGNOSIS — J34.89 NASAL DRAINAGE: ICD-10-CM

## 2024-09-12 DIAGNOSIS — R05.3 CHRONIC COUGH: ICD-10-CM

## 2024-09-13 ENCOUNTER — PATIENT MESSAGE (OUTPATIENT)
Dept: FAMILY MEDICINE CLINIC | Facility: CLINIC | Age: 39
End: 2024-09-13

## 2024-09-15 RX ORDER — AZELASTINE HYDROCHLORIDE 137 UG/1
1 SPRAY, METERED NASAL 2 TIMES DAILY
Qty: 30 ML | Refills: 0 | Status: SHIPPED | OUTPATIENT
Start: 2024-09-15

## 2024-09-18 NOTE — PROGRESS NOTES
"PT Evaluation     Today's date: 24  Patient name: Shelley Tineo  : 1985  MRN: [id number]  Referring provider: Tamica Foster DO  Dx:   Encounter Diagnosis     ICD-10-CM    1. Chronic pain of both knees  M25.561     M25.562     G89.29                      Assessment  Impairments: abnormal or restricted ROM, abnormal movement, activity intolerance, impaired physical strength, lacks appropriate home exercise program and pain with function  Symptom irritability: moderate    Assessment details: Shelley Tineo is a 39 y.o. female presenting to outpatient physical therapy with noted impairments including pain, impaired soft tissue mobility, reduced range of motion, reduced strength, reduced postural awareness, and reduced activity tolerance. Signs and symptoms at present are consistent with referring diagnosis of chronic B knee pain. Due to noted impairments, the patient's present functional limitations include difficulty with ADLs with increased need for assistance, reliance on medication and/or modalities for pain relief, reduced tolerance for functional mobility and activity, and difficulty completing work and HH  responsibilities. Patient to benefit from skilled outpatient physical therapy 2x/week for 6 weeks in order to reduce pain, maximize pain free range of motion, increase strength and stability, and improve functional mobility/functional activity in order to maximize return to prior level of function with reduced limitations. Home exercise program was provided and all questions answered to patient's level of satisfaction. Thank you for your referral.        Understanding of Dx/Px/POC: good     Prognosis: good    Goals  STGs to be achieved in 4 weeks:  1. Pt to demonstrate reduced subjective pain rating \"at worst\" by at least 2-3 points from Initial Eval in order to allow for reduced pain with ADLs and improved functional activity tolerance.   2. Pt to demonstrate increased AROM of B knees by at " least 5-10 degrees in order to allow for greater ease and independence with ADLs and functional mobility.   3. Pt to demonstrate increased strength and tolerance to activity of B knee flex/ext by at least 1/2-1 grade in order to improve safety and stability with ADLs and functional mobility.     LTGs to be achieved in 6-8 weeks:  1. Pt will be I with HEP in order to continue to improve quality of life and independence and reduce risk for re-injury.   2. Pt to demonstrate return to  stair climbing without limitations or restrictions.   3. Pt to demonstrate improved function as noted by achieving or exceeding predicted score on FOTO outcomes assessment tool.       Plan  Patient would benefit from: skilled physical therapy  Other planned modality interventions: Modalities prn for symptom management    Planned therapy interventions: manual therapy, neuromuscular re-education, therapeutic activities, therapeutic exercise, strengthening, stretching and home exercise program    Frequency: 2x week  Duration in weeks: 6  Plan of Care beginning date: 2024  Plan of Care expiration date: 10/31/2024  Treatment plan discussed with: PTA and patient    Subjective Evaluation    History of Present Illness  Mechanism of injury: Pt notes she has progressive knee pain for many years with biggest complaint of diff climbing stairs. Notes grinding in B knees. States her knees ache intermittentlyt/o the day, notes her knees swell R>L and she uses icy hot on B knees. Pt takes ibuprofen (4) at night to help sleep. No relief with tylenol.Pt notes she is unable to kneel, notes feet get tingly after kneeling. Has diff walking on inclines. Diff with prolonged amb-- must take breaks.          Recurrent probem    Quality of life: fair    Patient Goals  Patient goals for therapy: decreased pain, increased strength and increased motion  Patient goal: attends gym 2x/week,  prevent progression of knee pain  Pain  Current pain ratin  At best  pain ratin  At worst pain ratin (at night, get restless)  Quality: dull ache and sharp (sharp on stairs or hills or kneeling)  Relieving factors: rest and medications (icy hot)  Aggravating factors: stair climbing and walking (kneeling, squatting)  Progression: worsening    Social Support  Steps to enter house: yes  8  Stairs in house: yes   25  Lives in: multiple-level home  Lives with: spouse and young children    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[employer])  Treatments  Current treatment: medication and physical therapy      Objective     Observations     Additional Observation Details  Pt is morbidly obese    Palpation     Additional Palpation Details  Mod crepitus R knee, L knee min crepitus    Active Range of Motion   Left Knee   Flexion: 115 degrees   Extension: 0 degrees     Right Knee   Flexion: 118 degrees   Extension: 0 degrees     Strength/Myotome Testing     Left Knee   Flexion: 5  Extension: 5    Right Knee   Flexion: 5  Extension: 5             Precautions: none    Re-eval Date: 10/31/24    Date        Visit Count        FOTO        Pain In        Pain Out              Manuals                                        Neuro Re-Ed                                                                Ther Ex        Nustep        Knee ext mach        Knee flex mach        Leg press        Step ups  F/L        Standing 3 way SLR        bridges        Incline calf stretch        B quad stretch                                Ham stretch        Ther Activity                        Gait Training                        Modalities

## 2024-09-19 ENCOUNTER — EVALUATION (OUTPATIENT)
Dept: PHYSICAL THERAPY | Facility: CLINIC | Age: 39
End: 2024-09-19
Payer: COMMERCIAL

## 2024-09-19 DIAGNOSIS — G89.29 CHRONIC PAIN OF BOTH KNEES: Primary | ICD-10-CM

## 2024-09-19 DIAGNOSIS — M25.561 CHRONIC PAIN OF BOTH KNEES: Primary | ICD-10-CM

## 2024-09-19 DIAGNOSIS — M25.562 CHRONIC PAIN OF BOTH KNEES: Primary | ICD-10-CM

## 2024-09-19 PROCEDURE — 97162 PT EVAL MOD COMPLEX 30 MIN: CPT | Performed by: PHYSICAL MEDICINE & REHABILITATION

## 2024-09-20 ENCOUNTER — OFFICE VISIT (OUTPATIENT)
Dept: PHYSICAL THERAPY | Facility: CLINIC | Age: 39
End: 2024-09-20
Payer: COMMERCIAL

## 2024-09-20 DIAGNOSIS — M25.562 CHRONIC PAIN OF BOTH KNEES: Primary | ICD-10-CM

## 2024-09-20 DIAGNOSIS — M25.561 CHRONIC PAIN OF BOTH KNEES: Primary | ICD-10-CM

## 2024-09-20 DIAGNOSIS — G89.29 CHRONIC PAIN OF BOTH KNEES: Primary | ICD-10-CM

## 2024-09-20 PROCEDURE — 97110 THERAPEUTIC EXERCISES: CPT

## 2024-09-20 NOTE — PROGRESS NOTES
"Daily Note     Today's date: 2024  Patient name: Shelley Tineo  : 1985  MRN: [id number]  Referring provider: Tamica Foster DO  Dx:   Encounter Diagnosis     ICD-10-CM    1. Chronic pain of both knees  M25.561     M25.562     G89.29                      Subjective:   Pt. reports  \"Not too bad\" initially at beginning of therapy session re: B knees, however did note variable pain/sx during performance of some exer.  Level range = \"2-6\"/10.      Objective: See treatment diary below      Assessment: Tolerated treatment Fairly Well/Well overall.   Patient exhibited good technique with therapeutic exercises and would benefit from continued PT.      Plan: Progress treatment as tolerated.               Precautions: none    Re-eval Date: 10/31/24    Date  9.20      Visit Count  2      FOTO        Pain In  \"Not too bad\" initially    2-6/10 variable pain/sx level  w/exer      Pain Out              Manuals                                        Neuro Re-Ed                                                                Ther Ex  9.20      Nustep  **L1 x 10 min      Knee ext mach  **22# 30x      Knee flex mach  **33# 30x      Leg press  **70# 30x        Step ups  F/L  *NV      Standing 3 way SLR  **B 1x10 ea      bridges  *NV        Incline calf stretch  ** B 3x/30\"      B quad stretch  *NV                              Ham stretch  ** B 3x/30\"  Long-seated        Ther Activity                        Gait Training                        Modalities                             "

## 2024-09-24 ENCOUNTER — OFFICE VISIT (OUTPATIENT)
Dept: PHYSICAL THERAPY | Facility: CLINIC | Age: 39
End: 2024-09-24
Payer: COMMERCIAL

## 2024-09-24 DIAGNOSIS — G89.29 CHRONIC PAIN OF BOTH KNEES: Primary | ICD-10-CM

## 2024-09-24 DIAGNOSIS — M25.562 CHRONIC PAIN OF BOTH KNEES: Primary | ICD-10-CM

## 2024-09-24 DIAGNOSIS — M25.561 CHRONIC PAIN OF BOTH KNEES: Primary | ICD-10-CM

## 2024-09-24 PROCEDURE — 97110 THERAPEUTIC EXERCISES: CPT

## 2024-09-24 NOTE — PROGRESS NOTES
"Daily Note     Today's date: 2024  Patient name: Shelley Tineo  : 1985  MRN: [id number]  Referring provider: Tamica Foster DO  Dx:   Encounter Diagnosis     ICD-10-CM    1. Chronic pain of both knees  M25.561     M25.562     G89.29                      Subjective:   Pt. denies any actual pain @ B knees at this present time, but rather describes \"soreness\" @ B lat knees and B sub-patellar regions.       Objective: See treatment diary below      Assessment: Tolerated treatment well. Patient exhibited good technique with therapeutic exercises and would benefit from continued PT.      Plan: Continue per plan of care.            Precautions: none    Re-eval Date: 10/31/24    Date       Visit Count  2 3     FOTO        Pain In  \"Not too bad\" initially    2-6/10 variable pain/sx level  w/exer 0/10     Pain Out   Some Mm soreness           Manuals                                        Neuro Re-Ed                                                                Ther Ex       Nustep  **L1 x 10 min L2 x 10 min     Knee ext mach  **22# 30x 22# 30x     Knee flex mach  **33# 30x 33# 40x       Leg press  **70# 30x   70# 30x     Step ups  F/L  *NV **B Alt 1x10 ea     Standing 3 way SLR  **B 1x10 ea B 1x10 ea     bridges  *NV        Incline calf stretch  ** B 3x/30\" B 4x/30\"     B quad stretch  *NV **B 3x/20\"                             Ham stretch  ** B 3x/30\"  Long-seated   B 3x/30\"  Long-seated     Ther Activity                        Gait Training                        Modalities                               "

## 2024-09-26 ENCOUNTER — RA CDI HCC (OUTPATIENT)
Dept: OTHER | Facility: HOSPITAL | Age: 39
End: 2024-09-26

## 2024-09-26 PROBLEM — U09.9 POST-ACUTE SEQUELAE OF COVID-19 (PASC): Status: RESOLVED | Noted: 2022-07-17 | Resolved: 2024-09-26

## 2024-09-26 NOTE — PROGRESS NOTES
"Daily Note     Today's date: 2024  Patient name: Shelley Tineo  : 1985  MRN: [id number]  Referring provider: Tamica Foster DO  Dx:   Encounter Diagnosis     ICD-10-CM    1. Chronic pain of both knees  M25.561     M25.562     G89.29                      Subjective:  Pt. states her B knees are hurting today, which she attributes to \"maybe the weather\".  L side > R side.      Objective: See treatment diary below      Assessment: Tolerated treatment Fairly Well overall.  Patient exhibited good technique with therapeutic exercises and would benefit from continued PT.      Plan: Progress treatment as tolerated.               Precautions: none    Re-eval Date: 10/31/24                                                                                                     9.27  Visit Count  2 3 4    FOTO        Pain In  \"Not too bad\" initially    2-6/10 variable pain/sx level  w/exer 0/10 L knee 3/10  R knee \"a little bit\"    Increased sx w/exer (min.)    Pain Out   Some Mm soreness \"Same\"          Manuals    .                                    Neuro Re-Ed                                                                Ther Ex  9. 9 9.    Nustep  **L1 x 10 min L2 x 10 min L2 x 10 min    Knee ext mach  **22# 30x 22# 30x 22# 40x    Knee flex mach  **33# 30x 33# 40x   33# 40x    Leg press  **70# 30x   70# 30x 70# 40x    Step ups  F/L  *NV **B Alt 1x10 ea B Alt 1x10 ea    Standing 3 way SLR  **B 1x10 ea B 1x10 ea B 1x15 ea    bridges  *NV        Incline calf stretch  ** B 3x/30\" B 4x/30\" B 4x/20\"    B quad stretch  *NV **B 3x/20\" B 3x/20\"                            Ham stretch  ** B 3x/30\"  Long-seated   B 3x/30\"  Long-seated B 4x/30\"  Long-seated    Ther Activity                        Gait Training                        Modalities                                 "

## 2024-09-27 ENCOUNTER — OFFICE VISIT (OUTPATIENT)
Dept: PHYSICAL THERAPY | Facility: CLINIC | Age: 39
End: 2024-09-27
Payer: COMMERCIAL

## 2024-09-27 DIAGNOSIS — M25.562 CHRONIC PAIN OF BOTH KNEES: Primary | ICD-10-CM

## 2024-09-27 DIAGNOSIS — M25.561 CHRONIC PAIN OF BOTH KNEES: Primary | ICD-10-CM

## 2024-09-27 DIAGNOSIS — G89.29 CHRONIC PAIN OF BOTH KNEES: Primary | ICD-10-CM

## 2024-09-27 PROCEDURE — 97110 THERAPEUTIC EXERCISES: CPT

## 2024-10-01 ENCOUNTER — OFFICE VISIT (OUTPATIENT)
Dept: PHYSICAL THERAPY | Facility: CLINIC | Age: 39
End: 2024-10-01
Payer: COMMERCIAL

## 2024-10-01 DIAGNOSIS — M25.562 CHRONIC PAIN OF BOTH KNEES: Primary | ICD-10-CM

## 2024-10-01 DIAGNOSIS — M25.561 CHRONIC PAIN OF BOTH KNEES: Primary | ICD-10-CM

## 2024-10-01 DIAGNOSIS — G89.29 CHRONIC PAIN OF BOTH KNEES: Primary | ICD-10-CM

## 2024-10-01 PROCEDURE — 97110 THERAPEUTIC EXERCISES: CPT

## 2024-10-01 NOTE — PROGRESS NOTES
"Daily Note     Today's date: 10/1/2024  Patient name: Shelley Tineo  : 1985  MRN: [id number]  Referring provider: Tamica Foster DO  Dx:   Encounter Diagnosis     ICD-10-CM    1. Chronic pain of both knees  M25.561     M25.562     G89.29                      Subjective:  Pt. states her B knees are \"good\" today.  Denies any pain or sx @ this present time.      Objective: See treatment diary below      Assessment: Tolerated treatment well.  Pt. Progressing consistently thus far.  Patient exhibited good technique with therapeutic exercises and would benefit from continued PT.      Plan: Continue per plan of care.            Precautions: none    Re-eval Date: 10/31/24                                                                                                     9.27                  10.1  Visit Count  2 3 4 5   FOTO        Pain In  \"Not too bad\" initially    2-6/10 variable pain/sx level  w/exer 0/10 L knee 3/10  R knee \"a little bit\"    Increased sx w/exer (min.) 0/10  knees   Pain Out   Some Mm soreness \"Same\" L knee a little sore         Manuals    9.27 10.1                                   Neuro Re-Ed    . 10.1                                                           Ther Ex  9. 9. 9. 10.1   Nustep  **L1 x 10 min L2 x 10 min L2 x 10 min L3 x 10 min   Knee ext mach  **22# 30x 22# 30x 22# 40x 22# 40x   Knee flex mach  **33# 30x 33# 40x   33# 40x 33# 40x   Leg press  **70# 30x   70# 30x 70# 40x 70# 40x   Step ups  F/L  *NV **B Alt 1x10 ea B Alt 1x10 ea B Alt 1x15 ea   Standing 3 way SLR  **B 1x10 ea B 1x10 ea B 1x15 ea B 1x15 ea   bridges  *NV        Incline calf stretch  ** B 3x/30\" B 4x/30\" B 4x/20\" B 4x/20\"   B quad stretch  *NV **B 3x/20\" B 3x/20\" B 4x/20\"                           Ham stretch  ** B 3x/30\"  Long-seated   B 3x/30\"  Long-seated B 4x/30\"  Long-seated B 4x/30\"  Long-seated   Ther Activity                        Gait Training                        Modalities                    "

## 2024-10-03 NOTE — PROGRESS NOTES
"Daily Note     Today's date: 10/4/2024  Patient name: Shelley Tineo  : 1985  MRN: [id number]  Referring provider: Tamica Foster DO  Dx:   Encounter Diagnosis     ICD-10-CM    1. Chronic pain of both knees  M25.561     M25.562     G89.29                      Subjective:  Pt. denies any pain or sx  @ B knees at this present time.       Objective: See treatment diary below      Assessment: Tolerated treatment Fairly Well/Well overall.   Did note her R knee started hurting while on NuStep.   No further sx t/o treatment session.  Patient exhibited good technique with therapeutic exercises and would benefit from continued PT.      Plan: Continue per plan of care.              Precautions: none    Re-eval Date: 10/31/24                                    10.4                                                                     9.27                  10.1  Visit Count 6 2 3 4 5   FOTO        Pain In 0/10 \"Not too bad\" initially    2-6/10 variable pain/sx level  w/exer 0/10 L knee 3/10  R knee \"a little bit\"    Increased sx w/exer (min.) 0/10  knees   Pain Out 0/10  Some Mm soreness \"Same\" L knee a little sore         Manuals    9.27 10.1                                   Neuro Re-Ed    9.27 10.1                                                           Ther Ex 10.4  9.20 9.24 9.27 10.1   Nustep L3 x 10 min **L1 x 10 min L2 x 10 min L2 x 10 min L3 x 10 min   Knee ext mach 22# 45x **22# 30x 22# 30x 22# 40x 22# 40x   Knee flex mach 33# 45x **33# 30x 33# 40x   33# 40x 33# 40x   Leg press 80# 30x **70# 30x   70# 30x 70# 40x 70# 40x   Step ups  F/L B Alt 1x15 ea *NV **B Alt 1x10 ea B Alt 1x10 ea B Alt 1x15 ea   Standing 3 way SLR B 1x20 ea **B 1x10 ea B 1x10 ea B 1x15 ea B 1x15 ea   bridges  *NV        Incline calf stretch B 5x/30\" ** B 3x/30\" B 4x/30\" B 4x/20\" B 4x/20\"   B quad stretch B 4x/20\" *NV **B /20\" B /20\" B 4x/\"                           Ham stretch B 4x/\"  Long-seated ** B /\"  Long-seated   B " "3x/30\"  Long-seated B 4x/30\"  Long-seated B 4x/30\"  Long-seated   Ther Activity                        Gait Training                        Modalities                                     "

## 2024-10-04 ENCOUNTER — OFFICE VISIT (OUTPATIENT)
Dept: PHYSICAL THERAPY | Facility: CLINIC | Age: 39
End: 2024-10-04
Payer: COMMERCIAL

## 2024-10-04 ENCOUNTER — OFFICE VISIT (OUTPATIENT)
Dept: FAMILY MEDICINE CLINIC | Facility: CLINIC | Age: 39
End: 2024-10-04
Payer: COMMERCIAL

## 2024-10-04 VITALS
RESPIRATION RATE: 17 BRPM | HEIGHT: 68 IN | DIASTOLIC BLOOD PRESSURE: 80 MMHG | SYSTOLIC BLOOD PRESSURE: 130 MMHG | BODY MASS INDEX: 44.41 KG/M2 | HEART RATE: 84 BPM | TEMPERATURE: 96.1 F | OXYGEN SATURATION: 99 % | WEIGHT: 293 LBS

## 2024-10-04 DIAGNOSIS — M25.562 CHRONIC PAIN OF BOTH KNEES: Primary | ICD-10-CM

## 2024-10-04 DIAGNOSIS — E66.01 MORBID OBESITY (HCC): ICD-10-CM

## 2024-10-04 DIAGNOSIS — E55.9 VITAMIN D INSUFFICIENCY: ICD-10-CM

## 2024-10-04 DIAGNOSIS — E03.9 HYPOTHYROIDISM, UNSPECIFIED TYPE: ICD-10-CM

## 2024-10-04 DIAGNOSIS — R73.03 PREDIABETES: Primary | ICD-10-CM

## 2024-10-04 DIAGNOSIS — M25.561 CHRONIC PAIN OF BOTH KNEES: Primary | ICD-10-CM

## 2024-10-04 DIAGNOSIS — G89.29 CHRONIC PAIN OF BOTH KNEES: Primary | ICD-10-CM

## 2024-10-04 DIAGNOSIS — E78.6 LOW HDL (UNDER 40): ICD-10-CM

## 2024-10-04 PROCEDURE — 97110 THERAPEUTIC EXERCISES: CPT

## 2024-10-04 PROCEDURE — 99214 OFFICE O/P EST MOD 30 MIN: CPT | Performed by: FAMILY MEDICINE

## 2024-10-04 NOTE — ASSESSMENT & PLAN NOTE
Start trial metformin for weight loss; pt also has prediabetes  Orders:    metFORMIN (GLUCOPHAGE) 500 mg tablet; Take 1 tablet (500 mg total) by mouth 2 (two) times a day with meals

## 2024-10-04 NOTE — PROGRESS NOTES
"Ambulatory Visit  Name: Shelley Tineo      : 1985      MRN: [id number]  Encounter Provider: Tamica Foster DO  Encounter Date: 10/4/2024   Encounter department: [facility] [place]    Assessment & Plan  Prediabetes  We are starting trial metformin today for weight loss as well as glycemic control benefit; pt's HbA1c tipped just into diabetes range at 6.5% one time this recent result, has been prediabetic for awhile; Recheck HbA1c in 3 months, if second HbA1c result  > = 6.5%, then would assign new diabetes dx, but for now still assessing as prediabetic  Orders:    metFORMIN (GLUCOPHAGE) 500 mg tablet; Take 1 tablet (500 mg total) by mouth 2 (two) times a day with meals    Morbid obesity (HCC)  Start trial metformin for weight loss; pt also has prediabetes  Orders:    metFORMIN (GLUCOPHAGE) 500 mg tablet; Take 1 tablet (500 mg total) by mouth 2 (two) times a day with meals    Low HDL (under 40)  Recheck lipids in 6 months       Hypothyroidism, unspecified type  Recheck TFT's in 6 months       Vitamin D insufficiency  OTC advised          Chief Complaint   Patient presents with    Follow-up       History of Present Illness     Short interval recheck of elevated BP, new onset HA's, and various other complaints at her annual well adult visit last month  Labs ordered and obtained - HbA1c tipped just into diabetes range at 6.5% - pt has been prediabetic for awhile, I advise we will repeat in 3 months and then determine if actual diabetes dx if remains > = 6.5%   Wasn't able to get appt with nutritionist at [place] until January  Started PT a month ago          Review of Systems        Objective     /80 (BP Location: Left arm, Patient Position: Sitting, Cuff Size: Large)   Pulse 84   Temp (!) 96.1 °F (35.6 °C) (Tympanic)   Resp 17   Ht 5' 8\" (1.727 m)   Wt (!) 156 kg (343 lb 4.8 oz)   SpO2 99%   BMI 52.20 kg/m²     Physical Exam  Vitals and nursing note reviewed.   Constitutional:       " General: She is not in acute distress.     Appearance: She is well-groomed. She is morbidly obese. She is not ill-appearing, toxic-appearing or diaphoretic.      Comments: Very pleasant as always   HENT:      Head: Normocephalic and atraumatic.      Nose: Nose normal.   Eyes:      Conjunctiva/sclera: Conjunctivae normal.   Neck:      Thyroid: No thyroid mass, thyromegaly or thyroid tenderness.      Vascular: No JVD.      Trachea: Trachea and phonation normal.   Cardiovascular:      Rate and Rhythm: Normal rate and regular rhythm.      Pulses: Normal pulses.      Heart sounds: Normal heart sounds.   Pulmonary:      Effort: Pulmonary effort is normal.      Breath sounds: Normal breath sounds and air entry.   Abdominal:      Palpations: Abdomen is soft. There is no hepatomegaly, splenomegaly or mass.      Tenderness: There is no abdominal tenderness.   Musculoskeletal:      Cervical back: Neck supple.      Right lower leg: No edema.      Left lower leg: No edema.   Lymphadenopathy:      Cervical: No cervical adenopathy.   Skin:     General: Skin is warm and dry.      Coloration: Skin is not pale.   Neurological:      Mental Status: She is alert and oriented to person, place, and time.      Gait: Gait normal.   Psychiatric:         Mood and Affect: Mood normal.         Behavior: Behavior normal. Behavior is cooperative.         Cognition and Memory: Cognition and memory normal.

## 2024-10-04 NOTE — ASSESSMENT & PLAN NOTE
We are starting trial metformin today for weight loss as well as glycemic control benefit; pt's HbA1c tipped just into diabetes range at 6.5% one time this recent result, has been prediabetic for awhile; Recheck HbA1c in 3 months, if second HbA1c result  > = 6.5%, then would assign new diabetes dx, but for now still assessing as prediabetic  Orders:    metFORMIN (GLUCOPHAGE) 500 mg tablet; Take 1 tablet (500 mg total) by mouth 2 (two) times a day with meals

## 2024-10-08 ENCOUNTER — OFFICE VISIT (OUTPATIENT)
Dept: PHYSICAL THERAPY | Facility: CLINIC | Age: 39
End: 2024-10-08
Payer: COMMERCIAL

## 2024-10-08 DIAGNOSIS — M25.561 CHRONIC PAIN OF BOTH KNEES: Primary | ICD-10-CM

## 2024-10-08 DIAGNOSIS — M25.562 CHRONIC PAIN OF BOTH KNEES: Primary | ICD-10-CM

## 2024-10-08 DIAGNOSIS — G89.29 CHRONIC PAIN OF BOTH KNEES: Primary | ICD-10-CM

## 2024-10-08 PROCEDURE — 97110 THERAPEUTIC EXERCISES: CPT

## 2024-10-08 NOTE — PROGRESS NOTES
"Daily Note     Today's date: 10/8/2024  Patient name: Shelley Tineo  : 1985  MRN: [id number]  Referring provider: Tamica Foster DO  Dx:   Encounter Diagnosis     ICD-10-CM    1. Chronic pain of both knees  M25.561     M25.562     G89.29                      Subjective:  Pt. denies any pain or sx @ B knees at this present time.      Objective: See treatment diary below      Assessment: Tolerated treatment well.  Patient exhibited good technique with therapeutic exercises and would benefit from continued PT.  Pt's greatest limitation as yet is ascending/descending stairs.      Plan: Progress treatment as tolerated.             Precautions: none    Re-eval Date: 10/31/24                                    10.4                 10.8                                                  9.27                  10.1  Visit Count 6 7 3 4 5   FOTO  *NV      Pain In 0/10 0/10 0/10 L knee 3/10  R knee \"a little bit\"    Increased sx w/exer (min.) 0/10  knees   Pain Out 0/10 No signif neg change Some Mm soreness \"Same\" L knee a little sore         Manuals  10.8  9.27 10.1                                   Neuro Re-Ed    9.27 10.1                                                           Ther Ex 10.4  10.8 9.24 9.27 10.1   Nustep L3 x 10 min L4 x 10 min   L2 x 10 min L2 x 10 min L3 x 10 min   Knee ext mach 22# 45x 22# 45x 22# 30x 22# 40x 22# 40x   Knee flex mach 33# 45x 44# 30x 33# 40x   33# 40x 33# 40x   Leg press 80# 30x 80# 30x   70# 30x 70# 40x 70# 40x   Step ups  F/L B Alt 1x15 ea B Alt 1x20 ea **B Alt 1x10 ea B Alt 1x10 ea B Alt 1x15 ea   Standing 3 way SLR B 1x20 ea B 1x20 ea B 1x10 ea B 1x15 ea B 1x15 ea   bridges  *NV        Incline calf stretch B 5x/30\"  B 5x/30\" B 4x/30\" B 4x/20\" B 4x/20\"   B quad stretch B 4x/20\" B 4x/20-30\" **B 3x/20\" B 3x/20\" B 4x/20\"                           Ham stretch B 4x/30\"  Long-seated B 5x/30\"  Long-seated   B 3x/30\"  Long-seated B 4x/30\"  Long-seated B 4x/30\"  Long-seated   Ther " Activity                        Gait Training                        Modalities

## 2024-10-10 NOTE — PROGRESS NOTES
"Daily Note     Today's date: 10/11/2024  Patient name: Shelley Tineo  : 1985  MRN: [id number]  Referring provider: Tamica Foster DO  Dx:   Encounter Diagnosis     ICD-10-CM    1. Chronic pain of both knees  M25.561     M25.562     G89.29                      Subjective:  Pt. reports R med. knee is currently  \"a little sore/achey\".      Objective: See treatment diary below      Assessment: Tolerated treatment well.  Patient exhibited good technique with therapeutic exercises and would benefit from continued PT.  *FOTO survey results today exceeded initial projected score.       Plan: Continue per plan of care.            Precautions: none    Re-eval Date: 10/31/24                                  10.4                 10.8                 10.11                9.27               10.1  Visit Count 6 7 8 4 5   FOTO  *NV *completed 10.11    Initial projected score met/exceeded     Pain In 0/10 0/10 0/10  R med. knee   \"a little sore/achey\"  L knee 3/10  R knee \"a little bit\"    Increased sx w/exer (min.) 0/10  knees   Pain Out 0/10 No signif neg change No signif neg change \"Same\" L knee a little sore         Manuals  10.8 10.11  9.27 10.1                                   Neuro Re-Ed   10.11  9.27 10.1      **wobble board    F/B 30x  side-side 30x                                                     Ther Ex 10.4  10.8 10.11  9.27 10.1   Nustep L3 x 10 min L4 x 10 min   L4 x 10 min L2 x 10 min L3 x 10 min   Knee ext mach 22# 45x 22# 45x 33# 30x 22# 40x 22# 40x   Knee flex mach 33# 45x 44# 30x 44# 30x   33# 40x 33# 40x   Leg press 80# 30x 80# 30x   80# 40x 70# 40x 70# 40x   Step ups  F/L B Alt 1x15 ea B Alt 1x20 ea B Alt 1x20 ea B Alt 1x10 ea B Alt 1x15 ea   Standing 3 way SLR B 1x20 ea B 1x20 ea B 1x25 ea B 1x15 ea B 1x15 ea   bridges  *NV        Incline calf stretch B 5x/30\"  B 5x/30\" B 5x/30\" B 4x/20\" B 4x/20\"   B quad stretch B 4x/20\" B 4x/20-30\" B 4x/30\" B 3x/20\" B 4x/20\"                           Ham stretch " "B 4x/30\"  Long-seated B 5x/30\"  Long-seated   B 5x/30\"  Long-seated B 4x/30\"  Long-seated B 4x/30\"  Long-seated   Ther Activity                        Gait Training                        Modalities                                         "

## 2024-10-11 ENCOUNTER — OFFICE VISIT (OUTPATIENT)
Dept: PHYSICAL THERAPY | Facility: CLINIC | Age: 39
End: 2024-10-11
Payer: COMMERCIAL

## 2024-10-11 DIAGNOSIS — G89.29 CHRONIC PAIN OF BOTH KNEES: Primary | ICD-10-CM

## 2024-10-11 DIAGNOSIS — M25.562 CHRONIC PAIN OF BOTH KNEES: Primary | ICD-10-CM

## 2024-10-11 DIAGNOSIS — M25.561 CHRONIC PAIN OF BOTH KNEES: Primary | ICD-10-CM

## 2024-10-11 PROCEDURE — 97110 THERAPEUTIC EXERCISES: CPT

## 2024-10-12 DIAGNOSIS — R05.3 CHRONIC COUGH: ICD-10-CM

## 2024-10-12 DIAGNOSIS — J34.89 NASAL DRAINAGE: ICD-10-CM

## 2024-10-13 RX ORDER — AZELASTINE HYDROCHLORIDE 137 UG/1
1 SPRAY, METERED NASAL 2 TIMES DAILY
Qty: 30 ML | Refills: 0 | Status: SHIPPED | OUTPATIENT
Start: 2024-10-13

## 2024-10-13 NOTE — PROGRESS NOTES
"Daily Note     Today's date: 10/18/2024  Patient name: Shelley Tineo  : 1985  MRN: [id number]  Referring provider: Tamica Foster DO  Dx:   Encounter Diagnosis     ICD-10-CM    1. Chronic pain of both knees  M25.561     M25.562     G89.29                      Subjective:   Pt. states the wobble board made her B knees more achey last 2x she performed that exer.   Currently her B  knees feel like a \"throbbing ache\".      Objective: See treatment diary below      Assessment: Tolerated treatment well.  Sx no worse with exer. (Held wobble board exer.) Patient exhibited good technique with therapeutic exercises and would benefit from continued PT.      Plan: Continue per plan of care.              Precautions: none    Re-eval Date: 10/31/24                                  10.4                 10.8                 10.11                10.15               10.18  Visit Count 6 7 8 9 10   FOTO  *NV *completed 10.11    Initial projected score met/exceeded     Pain In 0/10 0/10 0/10  R med. knee   \"a little sore/achey\"   \"throbbing ache\" B knees   Pain Out 0/10 No signif neg change No signif neg change  Sx no worse         Manuals  10.8 10.11  10.15 10.18                                   Neuro Re-Ed   10.11  10.15 10.18      **wobble board    F/B 30x  side-side 30x wobble board    F/B 30x  side-side 30x *deferred                                                   Ther Ex 10.4  10.8 10.11  10.15 10.18   Nustep L3 x 10 min L4 x 10 min   L4 x 10 min L4 x 10 min L4 x 10 min   Knee ext mach 22# 45x 22# 45x 33# 30x 33# 30x 33# 40x   Knee flex mach 33# 45x 44# 30x 44# 30x   44# 40x 44# 40x   Leg press 80# 30x 80# 30x   80# 40x 80# 40x 90# 30x   Step ups  F/L B Alt 1x15 ea B Alt 1x20 ea B Alt 1x20 ea B Alt 1x20 ea B Alt 1x25 ea   Standing 3 way SLR B 1x20 ea B 1x20 ea B 1x25 ea B 1x25 ea B 1x25 ea   bridges  *NV    **    Incline calf stretch B 5x/30\"  B 5x/30\" B 5x/30\" B 6x/30\" B 6x/30\"   B quad stretch B 4x/20\" B 4x/20-30\" " "B 4x/30\" B 4x/30\" B 5x/20\"                           Ham stretch B 4x/30\"  Long-seated B 5x/30\"  Long-seated   B 5x/30\"  Long-seated B 5x/30\"  Long-seated B 5x/30\"  Long-seated   Ther Activity                        Gait Training                        Modalities                                           "

## 2024-10-14 NOTE — PROGRESS NOTES
"Daily Note     Today's date: 10/15/2024  Patient name: Shelley Tineo  : 1985  MRN: [id number]  Referring provider: Tamica Foster DO  Dx:   Encounter Diagnosis     ICD-10-CM    1. Chronic pain of both knees  M25.561     M25.562     G89.29                      Subjective:   Pt. denies any pain or sx @ B knees at this present time.       Objective: See treatment diary below      Assessment: Tolerated treatment well.  Pt. Progressing consistently to date. Patient exhibited good technique with therapeutic exercises and would benefit from continued PT.      Plan: Continue per plan of care.            Precautions: none    Re-eval Date: 10/31/24                                  10.4                 10.8                 10.11               10.15               10.1  Visit Count 6 7 8 9 5   FOTO  *NV *completed 10.11    Initial projected score met/exceeded     Pain In 0/10 0/10 0/10  R med. knee   \"a little sore/achey\"  0/10 0/10  knees   Pain Out 0/10 No signif neg change No signif neg change No signif neg change L knee a little sore         Manuals  10.8 10.11  10.15 10.1                                   Neuro Re-Ed   10.11  10.15 10.1      **wobble board    F/B 30x  side-side 30x wobble board    F/B 30x  side-side 30x                                                    Ther Ex 10.4  10.8 10.11  10.15 10.1   Nustep L3 x 10 min L4 x 10 min   L4 x 10 min L4 x 10 min L3 x 10 min   Knee ext mach 22# 45x 22# 45x 33# 30x 33# 30x 22# 40x   Knee flex mach 33# 45x 44# 30x 44# 30x   44# 40x 33# 40x   Leg press 80# 30x 80# 30x   80# 40x 80# 45x 70# 40x   Step ups  F/L B Alt 1x15 ea B Alt 1x20 ea B Alt 1x20 ea B Alt 1x25 ea B Alt 1x15 ea   Standing 3 way SLR B 1x20 ea B 1x20 ea B 1x25 ea B 1x25 ea B 1x15 ea   bridges  *NV        Incline calf stretch B 5x/30\"  B 5x/30\" B 5x/30\" B 5x/45\" B 4x/20\"   B quad stretch B 4x\" B 4x-30\" B 4\" B 4x\" B 4x\"                           Ham stretch B \"  Long-seated B " "5x/30\"  Long-seated   B 5x/30\"  Long-seated B 5x/30\"  Long-seated B 4x/30\"  Long-seated   Ther Activity                        Gait Training                        Modalities                                           "

## 2024-10-15 ENCOUNTER — OFFICE VISIT (OUTPATIENT)
Dept: PHYSICAL THERAPY | Facility: CLINIC | Age: 39
End: 2024-10-15
Payer: COMMERCIAL

## 2024-10-15 DIAGNOSIS — G89.29 CHRONIC PAIN OF BOTH KNEES: Primary | ICD-10-CM

## 2024-10-15 DIAGNOSIS — M25.561 CHRONIC PAIN OF BOTH KNEES: Primary | ICD-10-CM

## 2024-10-15 DIAGNOSIS — M25.562 CHRONIC PAIN OF BOTH KNEES: Primary | ICD-10-CM

## 2024-10-15 PROCEDURE — 97110 THERAPEUTIC EXERCISES: CPT

## 2024-10-18 ENCOUNTER — OFFICE VISIT (OUTPATIENT)
Dept: PHYSICAL THERAPY | Facility: CLINIC | Age: 39
End: 2024-10-18
Payer: COMMERCIAL

## 2024-10-18 DIAGNOSIS — M25.562 CHRONIC PAIN OF BOTH KNEES: Primary | ICD-10-CM

## 2024-10-18 DIAGNOSIS — G89.29 CHRONIC PAIN OF BOTH KNEES: Primary | ICD-10-CM

## 2024-10-18 DIAGNOSIS — M25.561 CHRONIC PAIN OF BOTH KNEES: Primary | ICD-10-CM

## 2024-10-18 PROCEDURE — 97110 THERAPEUTIC EXERCISES: CPT

## 2024-10-22 ENCOUNTER — OFFICE VISIT (OUTPATIENT)
Dept: PHYSICAL THERAPY | Facility: CLINIC | Age: 39
End: 2024-10-22
Payer: COMMERCIAL

## 2024-10-22 DIAGNOSIS — M25.561 CHRONIC PAIN OF BOTH KNEES: Primary | ICD-10-CM

## 2024-10-22 DIAGNOSIS — G89.29 CHRONIC PAIN OF BOTH KNEES: Primary | ICD-10-CM

## 2024-10-22 DIAGNOSIS — M25.562 CHRONIC PAIN OF BOTH KNEES: Primary | ICD-10-CM

## 2024-10-22 PROCEDURE — 97110 THERAPEUTIC EXERCISES: CPT

## 2024-10-22 NOTE — PROGRESS NOTES
"Daily Note     Today's date: 10/22/2024  Patient name: Shelley Tineo  : 1985  MRN: [id number]  Referring provider: Tamica Foster DO  Dx:   Encounter Diagnosis     ICD-10-CM    1. Chronic pain of both knees  M25.561     M25.562     G89.29                      Subjective:  Pt. denies any pain @ B knees at this present time. Says she \"feels good\".      Objective: See treatment diary below      Assessment: Tolerated treatment well.  Pt. Progressing consistently to date. Patient exhibited good technique with therapeutic exercises and would benefit from continued PT.      Plan: Continue per plan of care.              Precautions: none    Re-eval Date: 10/31/24                                  10.22                 10.                 10.                10.15               10.18  Visit Count 11 7 8 9 10   FOTO  *NV *completed 10.    Initial projected score met/exceeded     Pain In 0/10 0/10 0/10  R med. knee   \"a little sore/achey\"   \"throbbing ache\" B knees   Pain Out B Knees \"ache\" No signif neg change No signif neg change  Sx no worse         Manuals  10.8 10.11  10.15 10.18                                   Neuro Re-Ed   10  10.15 10.18      **wobble board    F/B 30x  side-side 30x wobble board    F/B 30x  side-side 30x *deferred                                                   Ther Ex 10  10.8 10.11  10.15 10.18   Nustep L5 x 10 min L4 x 10 min   L4 x 10 min L4 x 10 min L4 x 10 min   Knee ext mach 33# 40x 22# 45x 33# 30x 33# 30x 33# 40x   Knee flex mach 44# 45x 44# 30x 44# 30x   44# 40x 44# 40x   Leg press 90# 30x 80# 30x   80# 40x 80# 40x 90# 30x   Step ups  F/L B Alt 1x25 Fwd  **Lat onto AeroMat 1x20 B B Alt 1x20 ea B Alt 1x20 ea B Alt 1x20 ea B Alt 1x25 ea   Standing 3 way SLR B 1x30 ea B 1x20 ea B 1x25 ea B 1x25 ea B 1x25 ea   bridges  *NV    **    Incline calf stretch B 6x/30\"  B 5x/30\" B 5x/30\" B 6x/30\" B 6x/30\"   B quad stretch B 5x/20\" B 4x/20-30\" B 4x/30\" B 4x/30\" B 5x/20\"               " "            Ham stretch B 5x/30\"  Long-seated B 5x/30\"  Long-seated   B 5x/30\"  Long-seated B 5x/30\"  Long-seated B 5x/30\"  Long-seated   Ther Activity                        Gait Training                        Modalities                                             "

## 2024-10-25 ENCOUNTER — OFFICE VISIT (OUTPATIENT)
Dept: PHYSICAL THERAPY | Facility: CLINIC | Age: 39
End: 2024-10-25
Payer: COMMERCIAL

## 2024-10-25 DIAGNOSIS — M25.561 CHRONIC PAIN OF BOTH KNEES: Primary | ICD-10-CM

## 2024-10-25 DIAGNOSIS — M25.562 CHRONIC PAIN OF BOTH KNEES: Primary | ICD-10-CM

## 2024-10-25 DIAGNOSIS — G89.29 CHRONIC PAIN OF BOTH KNEES: Primary | ICD-10-CM

## 2024-10-25 PROCEDURE — 97110 THERAPEUTIC EXERCISES: CPT

## 2024-10-25 NOTE — PROGRESS NOTES
"Daily Note     Today's date: 10/25/2024  Patient name: Shelley Tineo  : 1985  MRN: [id number]  Referring provider: Tamica Foster DO  Dx:   Encounter Diagnosis     ICD-10-CM    1. Chronic pain of both knees  M25.561     M25.562     G89.29                      Subjective:  Pt. denies any Pain @ B knees upon beginning of treatment session, however sx of pulling @ B knees began while on the NuStep.  Also notes \"jabbing/grinding\" sensation while performing Leg ext on machine.   Pt. felt \"ok/good\" by conclusion of treatment session.       Objective: See treatment diary below      Assessment: Tolerated treatment Fairly Well/Well overall.  Patient would benefit from continued PT.  Pt's greatest limitation is still prolonged walking.   Pt will begin back at her local gym beginning today. Will workout 2x/week, as per her feedback.       Plan: Continue per plan of care.              Precautions: none    Re-eval Date: 10/31/24                                  10.22               10.25                 10.11                10.15               10.18  Visit Count 11 12 8 9 10   FOTO   *completed 10.11    Initial projected score met/exceeded     Pain In 0/10 0/10 0/10  R med. knee   \"a little sore/achey\"   \"throbbing ache\" B knees   Pain Out B Knees \"ache\" \"ok/good\" No signif neg change  Sx no worse         Manuals  10.25  10.11  10.15 10.                                   Neuro Re-Ed  10.25  10.11  10.15 10.18      D/C **wobble board    F/B 30x  side-side 30x wobble board    F/B 30x  side-side 30x *deferred                                                   Ther Ex 10.22  10.25  10.  10.15 10.   Nustep L5 x 10 min L5 x 10 min   L4 x 10 min L4 x 10 min L4 x 10 min   Knee ext mach 33# 40x 33# 45x 33# 30x 33# 30x 33# 40x   Knee flex mach 44# 45x 44# 45x 44# 30x   44# 40x 44# 40x   Leg press 90# 30x 90# 40x   80# 40x 80# 40x 90# 30x   Step ups  F/L B Alt 1x25 Fwd  **Lat onto AeroMat 1x20 B B Alt 1x25 Fwd  Lat onto " "AeroMat 1x20 B B Alt 1x20 ea B Alt 1x20 ea B Alt 1x25 ea   Standing 3 way SLR B 1x30 ea B 1x30 ea B 1x25 ea B 1x25 ea B 1x25 ea   bridges      **    Incline calf stretch B 6x/30\"  B 6x/30\" B 5x/30\" B 6x/30\" B 6x/30\"   B quad stretch B 5x/20\" B 5x/20\" B 4x/30\" B 4x/30\" B 5x/20\"                           Ham stretch B 5x/30\"  Long-seated B 5x/30\"  Long-seated   B 5x/30\"  Long-seated B 5x/30\"  Long-seated B 5x/30\"  Long-seated   Ther Activity                        Gait Training                        Modalities                                               "

## 2024-10-29 ENCOUNTER — OFFICE VISIT (OUTPATIENT)
Dept: PHYSICAL THERAPY | Facility: CLINIC | Age: 39
End: 2024-10-29
Payer: COMMERCIAL

## 2024-10-29 DIAGNOSIS — M25.561 CHRONIC PAIN OF BOTH KNEES: Primary | ICD-10-CM

## 2024-10-29 DIAGNOSIS — G89.29 CHRONIC PAIN OF BOTH KNEES: Primary | ICD-10-CM

## 2024-10-29 DIAGNOSIS — M25.562 CHRONIC PAIN OF BOTH KNEES: Primary | ICD-10-CM

## 2024-10-29 PROCEDURE — 97110 THERAPEUTIC EXERCISES: CPT

## 2024-10-29 NOTE — PROGRESS NOTES
"Daily Note     Today's date: 10/29/2024  Patient name: Shelley Tineo  : 1985  MRN: [id number]  Referring provider: Tamica Foster DO  Dx:   Encounter Diagnosis     ICD-10-CM    1. Chronic pain of both knees  M25.561     M25.562     G89.29                      Subjective:   Pt. denies any pain/sx @ B Knees at this present time.  Says \"Good!\".      Objective: See treatment diary below      Assessment:  Tolerated treatment well.   Pt. demonstrating improvement on a consistent basis to date. Patient exhibited good technique with therapeutic exercises and would benefit from continued PT.      Plan: Plan is for re-eval upon next treatment session. Continue per plan of care.            Precautions: none    Re-eval Date: 10/31/24                                  10.22               10.25               10.29                10.15               10.18  Visit Count 11 12 13 9 10   FOTO   *completed 10.    Initial projected score met/exceeded     Pain In 0/10 0/10 0/10 B knees  \"throbbing ache\" B knees   Pain Out B Knees \"ache\" \"ok/good\" No neg. change  Sx no worse         Manuals  10.25  10.29  10.15 10.                                   Neuro Re-Ed  10.25  10.29  10.15 10.      D/C  wobble board    F/B 30x  side-side 30x *deferred                                                   Ther Ex 10.22  10.25   10.29  10.15 10.   Nustep L5 x 10 min L5 x 10 min   L5 x 10 min L4 x 10 min L4 x 10 min   Knee ext mach 33# 40x 33# 45x 33# 45x 33# 30x 33# 40x   Knee flex mach 44# 45x 44# 45x 44# 45x   44# 40x 44# 40x   Leg press 90# 30x 90# 40x   90# 40x 80# 40x 90# 30x   Step ups  F/L B Alt 1x25 Fwd  **Lat onto AeroMat 1x20 B B Alt 1x30 Fwd  Lat onto AeroMat 1x20 B B Alt 1x30 Fwd  Lat 1x20 B B Alt 1x20 ea B Alt 1x25 ea   Standing 3 way SLR B 1x30 ea B 1x30 ea B 1x30 ea B 1x25 ea B 1x25 ea   bridges      **    Incline calf stretch B 6x/30\"  B 6x/30\" B 6x/30\" B 6x/30\" B 6x/30\"   B quad stretch B 5x/20\" B 5x/20\" B 5x/30\" B " "4x/30\" B 5x/20\"                           Ham stretch B 5x/30\"  Long-seated B 5x/30\"  Long-seated   B 5x/30\"  Long-seated B 5x/30\"  Long-seated B 5x/30\"  Long-seated   Ther Activity                        Gait Training                        Modalities                                                 "

## 2024-10-31 NOTE — PROGRESS NOTES
PT Re-Evaluation     Today's date:   Patient name: Shelley Tineo  : 1985  MRN: [id number]  Referring provider: Tamica Foster DO  Dx:   Encounter Diagnosis     ICD-10-CM    1. Chronic pain of both knees  M25.561     M25.562     G89.29                        Assessment  Impairments: abnormal or restricted ROM, abnormal movement, activity intolerance, impaired physical strength, lacks appropriate home exercise program and pain with function  Symptom irritability: moderate    Assessment details: Shelley Tineo is a 39 y.o. female presenting to outpatient physical therapy with noted impairments including pain, impaired soft tissue mobility, reduced range of motion, reduced strength, reduced postural awareness, and reduced activity tolerance. Signs and symptoms at present are consistent with referring diagnosis of chronic B knee pain. Due to noted impairments, the patient's present functional limitations include difficulty with ADLs with increased need for assistance, reliance on medication and/or modalities for pain relief, reduced tolerance for functional mobility and activity, and difficulty completing work and HH  responsibilities. Patient to benefit from skilled outpatient physical therapy 2x/week for 6 weeks in order to reduce pain, maximize pain free range of motion, increase strength and stability, and improve functional mobility/functional activity in order to maximize return to prior level of function with reduced limitations. Home exercise program was provided and all questions answered to patient's level of satisfaction. Thank you for your referral.    24 UPDATE: Pt notes her knees hurt more after therapy but she feels her endurance has improved and tolerance to stair climbing has increased.  Pt notes 20% improvement in B knees since SOC. Notes she attends PT 2x/week and the gym 2x/week. Grinding in B knees cont and pt notes popping L knee with ascending stairs. Notes she cont to have  "diff on inclines and stairs, denisse noted on a trip to [facility]  Pt ROM remaians about the same and is unable to improve due to tissue approximation with knee flex. Pt has improved strength BLEs as seen through ability to lift heavier weights with increased reps. Plan to cont PT 4 more weeks to maximize strength and mobility gains.  Understanding of Dx/Px/POC: good     Prognosis: good    Goals  STGs to be achieved in 4 weeks:  1. Pt to demonstrate reduced subjective pain rating \"at worst\" by at least 2-3 points from Initial Eval in order to allow for reduced pain with ADLs and improved functional activity tolerance.  ONGOING  2. Pt to demonstrate increased AROM of B knees by at least 5-10 degrees in order to allow for greater ease and independence with ADLs and functional mobility. ONGOING  3. Pt to demonstrate increased strength and tolerance to activity of B knee flex/ext by at least 1/2-1 grade in order to improve safety and stability with ADLs and functional mobility.  MET    LTGs to be achieved in 6-8 weeks:  1. Pt will be I with HEP in order to continue to improve quality of life and independence and reduce risk for re-injury. MET  2. Pt to demonstrate return to  stair climbing without limitations or restrictions.  ONGOING  3. Pt to demonstrate improved function as noted by achieving or exceeding predicted score on FOTO outcomes assessment tool. MET      Plan  Patient would benefit from: skilled physical therapy  Other planned modality interventions: Modalities prn for symptom management    Planned therapy interventions: manual therapy, neuromuscular re-education, therapeutic activities, therapeutic exercise, strengthening, stretching and home exercise program    Frequency: 2x week  Duration in weeks: 4  Plan of Care beginning date: 11/1/2024  Plan of Care expiration date: 11/29/2024  Treatment plan discussed with: PTA and patient    Subjective Evaluation    History of Present Illness  Mechanism of injury: Pt " notes she has progressive knee pain for many years with biggest complaint of diff climbing stairs. Notes grinding in B knees. States her knees ache intermittentlyt/o the day, notes her knees swell R>L and she uses icy hot on B knees. Pt takes ibuprofen (4) at night to help sleep. No relief with tylenol.Pt notes she is unable to kneel, notes feet get tingly after kneeling. Has diff walking on inclines. Diff with prolonged amb-- must take breaks.  24 UPDATE: Pt notes her knees hurt more after therapy but she feels her endurance has improved and tolerance to stair climbing has increased.  Pt notes 20% improvement in B knees since SOC. Notes she attends PT 2x/week and the gym 2x/week. Grinding in B knees cont and pt notes popping L knee with ascending stairs. Notes she cont to have diff on inclines and stairs, denisse noted on a trip to [employer] [place].          Recurrent probem    Quality of life: fair    Patient Goals  Patient goals for therapy: decreased pain, increased strength and increased motion  Patient goal: attends gym 2x/week,  prevent progression of knee pain  Pain  Current pain rating: 3  At best pain ratin  At worst pain rating: 10 (at night, get restless)  Quality: dull ache, sharp and burning (sharp on stairs or hills or kneeling)  Relieving factors: rest and medications (icy hot)  Aggravating factors: stair climbing and walking (kneeling, squatting)  Progression: worsening    Social Support  Steps to enter house: yes  8  Stairs in house: yes   25  Lives in: multiple-level home  Lives with: spouse and young children    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[email or web address])  Treatments  Current treatment: medication and physical therapy      Objective     Observations     Additional Observation Details  Pt is morbidly obese    Palpation     Additional Palpation Details  Mod crepitus R knee, L knee min crepitus    Active Range of Motion   Left Knee   Flexion: 117 degrees   Extension: 0 degrees     Right  "Knee   Flexion: 118 degrees   Extension: 0 degrees     Strength/Myotome Testing     Left Knee   Flexion: 5  Extension: 5    Right Knee   Flexion: 5  Extension: 5             Precautions: none    Re-eval Date: 10/31/24            DATE                      10.22               10.25               10.29       11/1                         Visit Count 11 12 13 14 RE    FOTO   *completed 10.11    Initial projected score met/exceeded     Pain In 0/10 0/10 0/10 B knees     Pain Out B Knees \"ache\" \"ok/good\" No neg. change           Manuals  10.25  10.29  11/1                                    Neuro Re-Ed  10.25  10.29         D/C                                                      Ther Ex 10.22  10.25   10.29    11/1    Nustep L5 x 10 min L5 x 10 min   L5 x 10 min L5x 10 min    Knee ext mach 33# 40x 33# 45x 33# 45x 33# 45x    Knee flex mach 44# 45x 44# 45x 44# 45x   44# 45x    Leg press 90# 30x 90# 40x   90# 40x 100# 40x    Step ups  F/L B Alt 1x25 Fwd  **Lat onto AeroMat 1x20 B B Alt 1x30 Fwd  Lat onto AeroMat 1x20 B B Alt 1x30 Fwd  Lat 1x20 B B Alt F/L   30x ea    Standing 3 way SLR B 1x30 ea B 1x30 ea B 1x30 ea B 30x  ea    bridges          Incline calf stretch B 6x/30\"  B 6x/30\" B 6x/30\" B 6x/30\"    B quad stretch B 5x/20\" B 5x/20\" B 5x/30\" B 5x/30\"                            Ham stretch B 5x/30\"  Long-seated B 5x/30\"  Long-seated   B 5x/30\"  Long-seated B 5x/30\"  Long-seated    Ther Activity                        Gait Training                        Modalities                                                 "

## 2024-11-01 ENCOUNTER — EVALUATION (OUTPATIENT)
Dept: PHYSICAL THERAPY | Facility: CLINIC | Age: 39
End: 2024-11-01
Payer: COMMERCIAL

## 2024-11-01 DIAGNOSIS — G89.29 CHRONIC PAIN OF BOTH KNEES: Primary | ICD-10-CM

## 2024-11-01 DIAGNOSIS — M25.561 CHRONIC PAIN OF BOTH KNEES: Primary | ICD-10-CM

## 2024-11-01 DIAGNOSIS — M25.562 CHRONIC PAIN OF BOTH KNEES: Primary | ICD-10-CM

## 2024-11-01 PROCEDURE — 97110 THERAPEUTIC EXERCISES: CPT | Performed by: PHYSICAL MEDICINE & REHABILITATION

## 2024-11-05 NOTE — PROGRESS NOTES
"Daily Note     Today's date: 2024  Patient name: Shelley Tineo  : 1985  MRN: [id number]  Referring provider: Tamica Foster DO  Dx:   Encounter Diagnosis     ICD-10-CM    1. Chronic pain of both knees  M25.561     M25.562     G89.29                      Subjective:   Pt. states B knees are \"Good\" at this present time.       Objective: See treatment diary below      Assessment: Tolerated treatment Fairly Well/well overall.  Pt. did note some pain/sx @ B knees by end of session today, however.  Patient exhibited good technique with therapeutic exercises and would benefit from continued PT.      Plan: Continue per plan of care.              Precautions: none    Re-eval Date: 10/31/24            DATE              10.22               10.25           10.29                 11/1                11.6         Visit Count 11 12 13 14 RE 15   FOTO   *completed 10.11    Initial projected score met/exceeded     Pain In 0/10 0/10 0/10 B knees  0/10 B knees     Pain Out B Knees \"ache\" \"ok/good\" No neg. change  3/10         Manuals  10.25  10.29  11/1 11.6                                    Neuro Re-Ed  10.25  10.29         D/C                                                      Ther Ex 10.22  10.25   10.29    11/1 11.6    Nustep L5 x 10 min L5 x 10 min   L5 x 10 min L5x 10 min L5x 10 min   Knee ext mach 33# 40x 33# 45x 33# 45x 33# 45x 33# 45x   Knee flex mach   44# 45x 44# 45x 44# 45x   44# 45x 44# 45x   Leg press 90# 30x 90# 40x   90# 40x 100# 40x 100# 40x   Step ups  F/L B Alt 1x25 Fwd  **Lat onto AeroMat 1x20 B B Alt 1x30 Fwd  Lat onto AeroMat 1x20 B B Alt 1x30 Fwd  Lat 1x20 B B Alt F/L   30x ea B Alt F/L   2x20 ea   Standing 3 way SLR B 1x30 ea B 1x30 ea B 1x30 ea B 30x  ea **Steam-  Boats   B 1x15   bridges          Incline calf stretch B 6x/30\"  B 6x/30\" B 6x/30\" B 6x/30\" B 6x/30\"   B quad stretch B 5x/20\" B 5x/20\" B 5x/30\" B 5x/30\" B 5x/30\"                           Ham stretch B 5x/30\"  Long-seated B " "5x/30\"  Long-seated   B 5x/30\"  Long-seated B 5x/30\"  Long-seated B 5x/30\"  Long-seated   Ther Activity                        Gait Training                          Modalities                                                      "

## 2024-11-06 ENCOUNTER — OFFICE VISIT (OUTPATIENT)
Dept: PHYSICAL THERAPY | Facility: CLINIC | Age: 39
End: 2024-11-06
Payer: COMMERCIAL

## 2024-11-06 DIAGNOSIS — M25.562 CHRONIC PAIN OF BOTH KNEES: Primary | ICD-10-CM

## 2024-11-06 DIAGNOSIS — M25.561 CHRONIC PAIN OF BOTH KNEES: Primary | ICD-10-CM

## 2024-11-06 DIAGNOSIS — G89.29 CHRONIC PAIN OF BOTH KNEES: Primary | ICD-10-CM

## 2024-11-06 PROCEDURE — 97110 THERAPEUTIC EXERCISES: CPT

## 2024-11-08 ENCOUNTER — OFFICE VISIT (OUTPATIENT)
Dept: PHYSICAL THERAPY | Facility: CLINIC | Age: 39
End: 2024-11-08
Payer: COMMERCIAL

## 2024-11-08 DIAGNOSIS — M25.561 CHRONIC PAIN OF BOTH KNEES: Primary | ICD-10-CM

## 2024-11-08 DIAGNOSIS — G89.29 CHRONIC PAIN OF BOTH KNEES: Primary | ICD-10-CM

## 2024-11-08 DIAGNOSIS — M25.562 CHRONIC PAIN OF BOTH KNEES: Primary | ICD-10-CM

## 2024-11-08 PROCEDURE — 97110 THERAPEUTIC EXERCISES: CPT

## 2024-11-08 NOTE — PROGRESS NOTES
"Daily Note     Today's date: 2024  Patient name: Shelley Tineo  : 1985  MRN: [id number]  Referring provider: Tamica Foster DO  Dx:   Encounter Diagnosis     ICD-10-CM    1. Chronic pain of both knees  M25.561     M25.562     G89.29                      Subjective: No new c/o  re: B knees.      Objective: See treatment diary below      Assessment: Tolerated treatment well. Patient exhibited good technique with therapeutic exercises and would benefit from continued PT.      Plan: Continue per plan of care.  Progress treatment as tolerated.             Precautions: none    Re-eval Date: 10/31/24            DATE             11.8               10.               10.                11.6         Visit Count 16 12 13 14 RE 15   FOTO   *completed 10.11    Initial projected score met/exceeded     Pain In 0/10 0/10 0/10 B knees  0/10 B knees     Pain Out No neg change \"ok/good\" No neg. change  3/10         Manuals 11.8  10.  10. 11.6                                    Neuro Re-Ed 11.8  10.  10.         D/C                                                      Ther Ex 11.8  10.   10. 11.6    Nustep L5 x 10 min L5 x 10 min   L5 x 10 min L5x 10 min L5x 10 min   Knee ext mach 33# 45x 33# 45x 33# 45x 33# 45x 33# 45x   Knee flex mach 44# 45x 44# 45x 44# 45x   44# 45x 44# 45x   Leg press 100# 45x 90# 40x   90# 40x 100# 40x 100# 40x   Step ups  F/L B Alt F/L   2x20 ea B Alt 1x30 Fwd  Lat onto AeroMat 1x20 B B Alt 1x30 Fwd  Lat 1x20 B B Alt F/L   30x ea B Alt F/L   2x20 ea   Standing 3 way SLR Steam-  Boats   B 1x15 B 1x30 ea B 1x30 ea B 30x  ea **Steam-  Boats   B 1x15   bridges          Incline calf stretch B 6x/30\"  B 6x/30\" B 6x/30\" B 6x/30\" B 6x/30\"   B quad stretch B 5x/20\" B 5x/20\" B 5x/30\" B 5x/30\" B 5x/30\"                           Ham stretch B 5x/30\"  Long-seated B 5x/30\"  Long-seated   B 5x/30\"  Long-seated B 5x/30\"  Long-seated B 5x/30\"  Long-seated   Ther " Activity                        Gait Training                          Modalities

## 2024-11-12 ENCOUNTER — OFFICE VISIT (OUTPATIENT)
Dept: PHYSICAL THERAPY | Facility: CLINIC | Age: 39
End: 2024-11-12
Payer: COMMERCIAL

## 2024-11-12 DIAGNOSIS — G89.29 CHRONIC PAIN OF BOTH KNEES: Primary | ICD-10-CM

## 2024-11-12 DIAGNOSIS — M25.561 CHRONIC PAIN OF BOTH KNEES: Primary | ICD-10-CM

## 2024-11-12 DIAGNOSIS — M25.562 CHRONIC PAIN OF BOTH KNEES: Primary | ICD-10-CM

## 2024-11-12 DIAGNOSIS — J34.89 NASAL DRAINAGE: ICD-10-CM

## 2024-11-12 DIAGNOSIS — R05.3 CHRONIC COUGH: ICD-10-CM

## 2024-11-12 PROCEDURE — 97110 THERAPEUTIC EXERCISES: CPT

## 2024-11-12 NOTE — PROGRESS NOTES
"Daily Note     Today's date: 2024  Patient name: Shelley Tineo  : 1985  MRN: [id number]  Referring provider: Tamica Foster DO  Dx:   Encounter Diagnosis     ICD-10-CM    1. Chronic pain of both knees  M25.561     M25.562     G89.29                      Subjective:  \"Not much\" pain @ B knees today....Only about a \"1\"/10, as per pt feedback.       Objective: See treatment diary below      Assessment: Tolerated treatment  Fairly Well/Well .  Patient demonstrated fatigue post treatment and would benefit from continued PT.      Plan: Continue per plan of care.            Precautions: none    Re-eval Date: 10/31/24            DATE             11.8               11.12              10.29                                 11.6         Visit Count 16 17 13 14 RE 15   FOTO   *completed 10.11    Initial projected score met/exceeded     Pain In 0/10 1/10 0/10 B knees  0/10 B knees     Pain Out No neg change B knees \"Tired\" No neg. change  3/10         Manuals 11.8  11.12 10.29   11.6                                    Neuro Re-Ed 11.8  11.12 10.29                                                              Ther Ex 11.8  11.12  10.29     11.6    Nustep L5 x 10 min L5 x 10 min   L5 x 10 min L5x 10 min L5x 10 min   Knee ext mach 33# 45x 44# 2x10 33# 45x 33# 45x 33# 45x   Knee flex mach 44# 45x 55# 2x10 44# 45x   44# 45x 44# 45x   Leg press 100# 45x 105# 30x   90# 40x 100# 40x 100# 40x   Step ups  F/L B Alt F/L   2x20 ea B Alt 1x30 Fwd    Lat onto AeroMat 1x20 B B Alt 1x30 Fwd  Lat 1x20 B B Alt F/L   30x ea B Alt F/L   2x20 ea   Standing 3 way SLR Steam-  Boats   B 1x15 Steam-  Boats   B 1x20 B 1x30 ea B 30x  ea **Steam-  Boats   B 1x15   bridges          Incline calf stretch B 6x/30\"  B 6x/30\" B 6x/30\" B 6x/30\" B 6x/30\"   B quad stretch B 5x/20\" B 5x/30\" B 5x/30\" B 5x/30\" B 5x/30\"                           Ham stretch B 5x/30\"  Long-seated B 5x/30\"  Long-seated   B 5x/30\"  Long-seated B " "5x/30\"  Long-seated B 5x/30\"  Long-seated   Ther Activity                        Gait Training                          Modalities                                                          "

## 2024-11-13 RX ORDER — AZELASTINE HYDROCHLORIDE 137 UG/1
1 SPRAY, METERED NASAL 2 TIMES DAILY
Qty: 30 ML | Refills: 0 | Status: SHIPPED | OUTPATIENT
Start: 2024-11-13

## 2024-11-15 ENCOUNTER — OFFICE VISIT (OUTPATIENT)
Dept: PHYSICAL THERAPY | Facility: CLINIC | Age: 39
End: 2024-11-15
Payer: COMMERCIAL

## 2024-11-15 DIAGNOSIS — M25.562 CHRONIC PAIN OF BOTH KNEES: Primary | ICD-10-CM

## 2024-11-15 DIAGNOSIS — M25.561 CHRONIC PAIN OF BOTH KNEES: Primary | ICD-10-CM

## 2024-11-15 DIAGNOSIS — G89.29 CHRONIC PAIN OF BOTH KNEES: Primary | ICD-10-CM

## 2024-11-15 PROCEDURE — 97110 THERAPEUTIC EXERCISES: CPT

## 2024-11-15 NOTE — PROGRESS NOTES
"Daily Note     Today's date: 11/15/2024  Patient name: Shelley Tineo  : 1985  MRN: [id number]  Referring provider: Tamica Foster DO  Dx:   Encounter Diagnosis     ICD-10-CM    1. Chronic pain of both knees  M25.561     M25.562     G89.29                      Subjective:   Pt. states \"feels like someone/something is banging/hitting\" on her B knees.  Rates pain level at this present time to = \"2\"/10.      Objective: See treatment diary below      Assessment: Tolerated treatment  Fairly Well/Well overall. Pain level less by end of treatment session.  . Patient exhibited good technique with therapeutic exercises and would benefit from continued PT.      Plan: Continue per plan of care.                Precautions: none    Re-eval Date: 10/31/24            DATE             11.8               11.12              11.15                 11                11.6         Visit Count 16 17 18 14 RE 15   FOTO   *completed 10.11    Initial projected score met/exceeded     Pain In 0/10 1/10 2/10 B knees  0/10 B knees     Pain Out No neg change B knees \"Tired\" 1/10  3/10         Manuals 11.8  11.12 11.15  11 11.6                                    Neuro Re-Ed 11.8  11.12 11.15                                                              Ther Ex 11.8  11.12 11.15     11.6    Nustep L5 x 10 min L5 x 10 min   L5 x 10 min L5x 10 min L5x 10 min   Knee ext mach 33# 45x 44# 2x10 44# 2x10 33# 45x 33# 45x   Knee flex mach 44# 45x 55# 2x10 55# 2x10   44# 45x 44# 45x   Leg press 100# 45x 105# 30x   105# 30x 100# 40x 100# 40x   Step ups  F/L B Alt F/L   2x20 ea B Alt 1x30 Fwd    Lat onto AeroMat 1x20 B B Alt 2x20 Fwd  Lat 1x30 B B Alt F/L   30x ea B Alt F/L   2x20 ea   Standing 3 way SLR Steam-  Boats   B 1x15 Steam-  Boats   B 1x20 Steam-  Boats   B 1x20 B 30x  ea **Steam-  Boats   B 1x15   bridges          Incline calf stretch B 6x/30\"  B 6x/30\" B 6x/30\" B 6x/30\" B 6x/30\"   B quad stretch B 5x/20\" B 5x/30\" B 5x/30\" B 5x/30\" B " "5x/30\"                           Ham stretch B 5x/30\"  Long-seated B 5x/30\"  Long-seated   B 5x/30\"  Long-seated B 5x/30\"  Long-seated B 5x/30\"  Long-seated   Ther Activity                        Gait Training                          Modalities                                                            "

## 2024-11-19 ENCOUNTER — OFFICE VISIT (OUTPATIENT)
Dept: PHYSICAL THERAPY | Facility: CLINIC | Age: 39
End: 2024-11-19
Payer: COMMERCIAL

## 2024-11-19 DIAGNOSIS — G89.29 CHRONIC PAIN OF BOTH KNEES: Primary | ICD-10-CM

## 2024-11-19 DIAGNOSIS — M25.562 CHRONIC PAIN OF BOTH KNEES: Primary | ICD-10-CM

## 2024-11-19 DIAGNOSIS — M25.561 CHRONIC PAIN OF BOTH KNEES: Primary | ICD-10-CM

## 2024-11-19 PROCEDURE — 97110 THERAPEUTIC EXERCISES: CPT

## 2024-11-19 NOTE — PROGRESS NOTES
"Daily Note     Today's date: 2024  Patient name: Shelley Tineo  : 1985  MRN: [id number]  Referring provider: Tamica Foster DO  Dx:   Encounter Diagnosis     ICD-10-CM    1. Chronic pain of both knees  M25.561     M25.562     G89.29                      Subjective:  Pt. reports pain level @ B knees is currently = \"3\"/10.  Pt. states today will be her last treatment visit, due to her upcoming holiday work schedule.     Objective: See treatment diary below      Assessment: Tolerated treatment  Fairly Well/Well overall . Patient  will self D/C services after today's visit due to work schedule conflict, and as per her discretion.       Plan: Continue per plan of care.              Precautions: none    Re-eval Date: 10/31/24            DATE             11.8               11.12              11.15                 11.19                11.6         Visit Count 16 17 18 19 15   FOTO   *completed 10.11    Initial projected score met/exceeded     Pain In 0/10 10 2/10 B knees 3/10 B knees 0/10 B knees     Pain Out No neg change B knees \"Tired\" 1/10 No neg change 3/10         Manuals 11.8  11.12 11.15  11.19  11.6                                    Neuro Re-Ed 11.8  11.12 11.15  11.19                                                             Ther Ex 11.8  11.12 11.15   11.19  11.6    Nustep L5 x 10 min L5 x 10 min   L5 x 10 min L5x 10 min L5x 10 min   Knee ext mach 33# 45x 44# 2x10 44# 2x10 44# 3x10 33# 45x   Knee flex mach 44# 45x 55# 2x10 55# 2x10   55# 3x10 44# 45x   Leg press 100# 45x 105# 30x   105# 30x 105# 40x 100# 40x   Step ups  F/L B Alt F/L   2x20 ea B Alt 1x30 Fwd    Lat onto AeroMat 1x20 B B Alt 2x20 Fwd  Lat 1x30 B B Alt 2x20 Fwd  Lat 1x30 B B Alt F/L   2x20 ea   Standing 3 way SLR Steam-  Boats   B 1x15 Steam-  Boats   B 1x20 Steam-  Boats   B 1x20 Steam-  Boats   B 1x25 **Steam-  Boats   B 1x15   bridges          Incline calf stretch B 6x\"  B 6x\" B 6x\" B 6\" B 6x\"   B quad " "stretch B 5x/20\" B 5x/30\" B 5x/30\" B 5x/30\" B 5x/30\"                           Ham stretch B 5x/30\"  Long-seated B 5x/30\"  Long-seated   B 5x/30\"  Long-seated B 5x/45\"  Long-seated B 5x/30\"  Long-seated   Ther Activity                        Gait Training                          Modalities                                                              "

## 2024-11-22 ENCOUNTER — APPOINTMENT (OUTPATIENT)
Dept: PHYSICAL THERAPY | Facility: CLINIC | Age: 39
End: 2024-11-22
Payer: COMMERCIAL

## 2024-11-26 ENCOUNTER — APPOINTMENT (OUTPATIENT)
Dept: PHYSICAL THERAPY | Facility: CLINIC | Age: 39
End: 2024-11-26
Payer: COMMERCIAL

## 2024-11-29 ENCOUNTER — APPOINTMENT (OUTPATIENT)
Dept: PHYSICAL THERAPY | Facility: CLINIC | Age: 39
End: 2024-11-29
Payer: COMMERCIAL

## 2024-11-29 ENCOUNTER — OFFICE VISIT (OUTPATIENT)
Dept: URGENT CARE | Facility: CLINIC | Age: 39
End: 2024-11-29
Payer: COMMERCIAL

## 2024-11-29 VITALS
BODY MASS INDEX: 44.41 KG/M2 | TEMPERATURE: 98.3 F | DIASTOLIC BLOOD PRESSURE: 86 MMHG | WEIGHT: 293 LBS | HEIGHT: 68 IN | RESPIRATION RATE: 20 BRPM | HEART RATE: 107 BPM | OXYGEN SATURATION: 98 % | SYSTOLIC BLOOD PRESSURE: 138 MMHG

## 2024-11-29 DIAGNOSIS — J01.90 ACUTE SINUSITIS, RECURRENCE NOT SPECIFIED, UNSPECIFIED LOCATION: Primary | ICD-10-CM

## 2024-11-29 DIAGNOSIS — R06.2 WHEEZING: ICD-10-CM

## 2024-11-29 PROCEDURE — 99213 OFFICE O/P EST LOW 20 MIN: CPT | Performed by: PHYSICIAN ASSISTANT

## 2024-11-29 RX ORDER — CEFDINIR 300 MG/1
300 CAPSULE ORAL EVERY 12 HOURS SCHEDULED
Qty: 20 CAPSULE | Refills: 0 | Status: SHIPPED | OUTPATIENT
Start: 2024-11-29 | End: 2024-12-09

## 2024-11-29 RX ORDER — PREDNISONE 10 MG/1
TABLET ORAL
Qty: 26 TABLET | Refills: 0 | Status: SHIPPED | OUTPATIENT
Start: 2024-11-29

## 2024-11-29 NOTE — PROGRESS NOTES
[facility]    NAME: Shelley Tineo is a 39 y.o. female  : 1985    MRN: [id number]  DATE: 2024  TIME: 12:47 PM    Assessment and Plan   Acute sinusitis, recurrence not specified, unspecified location [J01.90]  1. Acute sinusitis, recurrence not specified, unspecified location  predniSONE 10 mg tablet      2. Wheezing  cefdinir (OMNICEF) 300 mg capsule          Patient Instructions     Patient Instructions   I have prescribed an antibiotic for the infection.  Please take the antibiotic as prescribed and finish the entire prescription.  Take an over the counter probiotic or eat yogurt with live cultures in it (activia) to keep good bacteria in the gut and help prevent diarrhea.  Wash hands frequently to prevent the spread of infection.  Can use over the counter cough and cold medications to help with symptoms.  Ibuprofen and/or tylenol as needed for pain or fever.  If not improving over the next 7-10 days, follow up with PCP.          Chief Complaint     Chief Complaint   Patient presents with    Earache     Left ear and congestion for last week    Nasal Congestion     Sinus congestion for one week taking OTC mucinex. Now moving into chest. Denies fever       History of Present Illness   40 Yo female here with complaint of nasal congestion, sinus pressure, left ear pain for the past week.  Cough.  Getting worse. No fever, chills.  Noticed wheezing at night.        Review of Systems   Review of Systems   Constitutional:  Negative for appetite change, chills and fever.   HENT:  Positive for congestion, ear pain, postnasal drip and sinus pressure. Negative for ear discharge, facial swelling, sneezing and sore throat.    Respiratory:  Positive for cough and wheezing. Negative for shortness of breath.    Neurological:  Positive for headaches.       Current Medications     Current Outpatient Medications:     Azelastine HCl 137 MCG/SPRAY SOLN, instill 1 spray into each nostril twice a day, Disp: 30  mL, Rfl: 0    cefdinir (OMNICEF) 300 mg capsule, Take 1 capsule (300 mg total) by mouth every 12 (twelve) hours for 10 days, Disp: 20 capsule, Rfl: 0    famotidine (PEPCID) 20 mg tablet, take 1 tablet by mouth at bedtime, Disp: 30 tablet, Rfl: 2    levonorgestrel (MIRENA) 20 MCG/24HR IUD, 1 each by Intrauterine route once, Disp: , Rfl:     levothyroxine 75 mcg tablet, swallow 1 tablet once daily, Disp: 90 tablet, Rfl: 1    metFORMIN (GLUCOPHAGE) 500 mg tablet, Take 1 tablet (500 mg total) by mouth 2 (two) times a day with meals, Disp: 60 tablet, Rfl: 2    omeprazole (PriLOSEC) 40 MG capsule, Take 1 capsule (40 mg total) by mouth daily before lunch, Disp: 90 capsule, Rfl: 1    predniSONE 10 mg tablet, Take 3 tabs BID X 2 days, 2 tabs BID X 2 days, 1 tab BID X 2 days, 1 tab daily X 2 days, Disp: 26 tablet, Rfl: 0    Current Allergies     Allergies as of 2024 - Reviewed 2024   Allergen Reaction Noted    Blackberry flavor - food allergy Hives 2021    Shellfish-derived products - food allergy  2019    Tetanus toxoid Hives and Edema 2016          The following portions of the patient's history were reviewed and updated as appropriate: allergies, current medications, past family history, past medical history, past social history, past surgical history and problem list.   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     Past Surgical History:   Procedure Laterality Date     SECTION      EYE SURGERY      strabismus    TOOTH EXTRACTION       Family History   Problem Relation Age of Onset    COPD Mother     Colon polyps Mother     Diabetes Father     Colon polyps Father     No Known Problems Sister     No Known Problems Brother     No Known Problems Son     No Known Problems Daughter     No Known Problems Brother     No Known Problems Brother     No Known Problems  "Brother     No Known Problems Sister     Gallbladder disease Sister      Social History     Socioeconomic History    Marital status: /Civil Union     Spouse name: Not on file    Number of children: Not on file    Years of education: Not on file    Highest education level: Not on file   Occupational History    Not on file   Tobacco Use    Smoking status: Never    Smokeless tobacco: Never   Vaping Use    Vaping status: Never Used   Substance and Sexual Activity    Alcohol use: Not Currently     Alcohol/week: 0.0 standard drinks of alcohol     Comment: rare    Drug use: Never    Sexual activity: Yes     Partners: Male     Birth control/protection: I.U.D.   Other Topics Concern    Not on file   Social History Narrative    , 3 children    Works at [employer]     Financial Resource Strain: Not on file   Food Insecurity: Not on file   Transportation Needs: Not on file   Physical Activity: Not on file   Stress: Not on file   Social Connections: Not on file   Intimate Partner Violence: Not on file   Housing Stability: Not on file     Medications have been verified.    Objective   /86   Pulse (!) 107   Temp 98.3 °F (36.8 °C)   Resp 20   Ht 5' 8\" (1.727 m)   Wt (!) 154 kg (339 lb 12.8 oz)   SpO2 98%   BMI 51.67 kg/m²      Physical Exam   Physical Exam  Vitals and nursing note reviewed.   Constitutional:       General: She is not in acute distress.     Appearance: She is well-developed.   HENT:      Head: Normocephalic and atraumatic.      Right Ear: Tympanic membrane normal.      Left Ear: Tympanic membrane normal.      Nose: Congestion present. No mucosal edema.      Right Sinus: No maxillary sinus tenderness or frontal sinus tenderness.      Left Sinus: No maxillary sinus tenderness or frontal sinus tenderness.      Mouth/Throat:      Pharynx: Posterior oropharyngeal erythema present. No oropharyngeal exudate.   Eyes:      Conjunctiva/sclera: Conjunctivae normal. "   Cardiovascular:      Rate and Rhythm: Normal rate and regular rhythm.      Heart sounds: Normal heart sounds. No murmur heard.  Pulmonary:      Effort: Pulmonary effort is normal.      Breath sounds: No wheezing.

## 2024-12-13 DIAGNOSIS — J34.89 NASAL DRAINAGE: ICD-10-CM

## 2024-12-13 DIAGNOSIS — R05.3 CHRONIC COUGH: ICD-10-CM

## 2024-12-13 RX ORDER — AZELASTINE HYDROCHLORIDE 137 UG/1
1 SPRAY, METERED NASAL 2 TIMES DAILY
Qty: 30 ML | Refills: 1 | Status: SHIPPED | OUTPATIENT
Start: 2024-12-13

## 2024-12-21 DIAGNOSIS — R73.03 PREDIABETES: ICD-10-CM

## 2024-12-21 DIAGNOSIS — E66.01 MORBID OBESITY (HCC): ICD-10-CM

## 2024-12-30 ENCOUNTER — TRANSCRIBE ORDERS (OUTPATIENT)
Dept: SLEEP CENTER | Facility: CLINIC | Age: 39
End: 2024-12-30

## 2024-12-30 ENCOUNTER — OFFICE VISIT (OUTPATIENT)
Dept: PULMONOLOGY | Facility: CLINIC | Age: 39
End: 2024-12-30
Payer: COMMERCIAL

## 2024-12-30 VITALS
WEIGHT: 293 LBS | HEIGHT: 68 IN | HEART RATE: 103 BPM | RESPIRATION RATE: 18 BRPM | TEMPERATURE: 98.3 F | OXYGEN SATURATION: 98 % | BODY MASS INDEX: 44.41 KG/M2 | DIASTOLIC BLOOD PRESSURE: 90 MMHG | SYSTOLIC BLOOD PRESSURE: 170 MMHG

## 2024-12-30 DIAGNOSIS — R06.83 SNORING: ICD-10-CM

## 2024-12-30 DIAGNOSIS — J45.909 UNCOMPLICATED ASTHMA, UNSPECIFIED ASTHMA SEVERITY, UNSPECIFIED WHETHER PERSISTENT: ICD-10-CM

## 2024-12-30 DIAGNOSIS — G47.19 EXCESSIVE DAYTIME SLEEPINESS: ICD-10-CM

## 2024-12-30 DIAGNOSIS — G47.19 EXCESSIVE DAYTIME SLEEPINESS: Primary | ICD-10-CM

## 2024-12-30 DIAGNOSIS — E66.813 CLASS 3 OBESITY: ICD-10-CM

## 2024-12-30 DIAGNOSIS — R06.2 WHEEZING: Primary | ICD-10-CM

## 2024-12-30 PROBLEM — Z86.16 PERSONAL HISTORY OF COVID-19: Status: RESOLVED | Noted: 2022-01-20 | Resolved: 2024-12-30

## 2024-12-30 PROCEDURE — 99204 OFFICE O/P NEW MOD 45 MIN: CPT | Performed by: INTERNAL MEDICINE

## 2024-12-30 RX ORDER — BUDESONIDE AND FORMOTEROL FUMARATE DIHYDRATE 80; 4.5 UG/1; UG/1
2 AEROSOL RESPIRATORY (INHALATION) 2 TIMES DAILY
Qty: 10.2 G | Refills: 3 | Status: SHIPPED | OUTPATIENT
Start: 2024-12-30

## 2024-12-30 NOTE — ASSESSMENT & PLAN NOTE
Patient presents with wheezing, dry cough, dyspnea on exertion.  GERD is controlled on medications.  Post-nasal drip improved on astelin.  She has some improvement when on prednisone for sinus/ear infections.  Did not improved with albuterol in past- no trial of ICS  Normal PFTs 2022  Normal CXR 2023    Plan  Trial Symbicort 80-4.5 mcg 2 puffs BID to trial treatment for possible asthma, rinse mouth after use  RTC 2 months to assess response

## 2024-12-30 NOTE — ASSESSMENT & PLAN NOTE
Snoring, obesity BMI 51, Increased neck circumference, morning headaches, morning dry mouth, difficulty sleeping, excessive daytime sleepiness    Check home sleep study to assess for KAMLA.

## 2024-12-30 NOTE — PROGRESS NOTES
Pulmonary Outpatient Note   Shelley Tineo 39 y.o. female MRN: [id number]  12/30/2024      Referring Physician: Tamica Foster DO    Reason for Consultation:    Chief Complaint   Patient presents with    New Patient Visit     Wheeze for the last four years, burning in chest, night wheeze is bad     Wheezing         Assessment/Plan:    1. Wheezing  Assessment & Plan:  Patient presents with wheezing, dry cough, dyspnea on exertion.  GERD is controlled on medications.  Post-nasal drip improved on astelin.  She has some improvement when on prednisone for sinus/ear infections.  Did not improved with albuterol in past- no trial of ICS  Normal PFTs 2022  Normal CXR 2023    Plan  Trial Symbicort 80-4.5 mcg 2 puffs BID to trial treatment for possible asthma, rinse mouth after use  RTC 2 months to assess response  Orders:  -     Ambulatory Referral to Pulmonology  -     budesonide-formoterol (Symbicort) 80-4.5 MCG/ACT inhaler; Inhale 2 puffs 2 (two) times a day Rinse mouth after use.  2. Excessive daytime sleepiness  Assessment & Plan:  Snoring, obesity BMI 51, Increased neck circumference, morning headaches, morning dry mouth, difficulty sleeping, excessive daytime sleepiness    Check home sleep study to assess for KAMLA.  Orders:  -     Ambulatory Referral to Sleep Medicine; Future  3. Snoring  -     Ambulatory Referral to Sleep Medicine; Future  4. Uncomplicated asthma, unspecified asthma severity, unspecified whether persistent  -     budesonide-formoterol (Symbicort) 80-4.5 MCG/ACT inhaler; Inhale 2 puffs 2 (two) times a day Rinse mouth after use.  5. Class 3 obesity  Assessment & Plan:  Would benefit from weight loss. Following up in weight loss clinic.        Health Maintenance  Immunization History   Administered Date(s) Administered    DTP 12/21/1988, 03/22/1989, 05/24/1989, 08/20/1992    HiB 12/21/1988    INFLUENZA 11/13/2012    MMR 12/21/1988, 08/20/1992    OPV 12/21/1988, 03/22/1989, 08/20/1992    Rho (D) Immune  Globulin 10/11/2012, 04/08/2014    Tdap 12/05/2012      Return in about 2 months (around 2/28/2025).    History of Present Illness   HPI:  Shelley Tineo is a 39 y.o. female who has a history of severe obesity BMI 51.5, prediabetes, GERD, recurrent sinusitis, recurrent otitis media on R, hypothyroidism who presents for pulmonary evaluation.    2022 PFTs normal  2023 CXR normal    She is referred for wheeze by her PCP.  She is on PPI for GERD- had an EGD.    The wheezing is in the morning and night. And in cold weather.  She has shortness of breath walking up a hill or farther distances.  She went on a longer walk recently and had to stop with inclines.  She coughs every day.  Dry cough with a wheeze.  Tried albuterol- did not help.  No other inhalers.    No history of asthma.  Has been on prednisone for infections- her wheezing and coughing improves.    She is tired during the day. Does not doze off. Does not nap.  She has trouble sleeping.    Does not wake up short of breath  She snores.  She has morning headaches  She wakes up with a dry mouth.    Heartburn currently controlled.  She has IBS- occasionally with nausea.    She does note she has a post-nasal drip at times.  Takes astelin nasal spray which helps. Flonase did not.    Prescribed cefdinir and prednisone taper 11/29 by urgent care.  Has been seen in the past by Dr Souza by ENT for chronic serous otitis R ear and maxillary sinusitis.    Answers submitted by the patient for this visit:  Pulmonology Questionnaire (Submitted on 12/23/2024)  Chief Complaint: Primary symptoms  Chronicity: recurrent  When did you first notice your symptoms?: more than 1 year ago  How often do your symptoms occur?: daily  Since you first noticed this problem, how has it changed?: resolved  Do you have shortness of breath that occurs with effort or exertion?: Yes  Do you have ear congestion?: Yes  Do you have heartburn?: Yes  Do you have fatigue?: No  Do you have nasal  congestion?: No  Do you have shortness of breath when lying flat?: No  Do you have shortness of breath when you wake up?: No  Do you have sweats?: No  Have you experienced weight loss?: No  Which of the following makes your symptoms worse?: exercise, lying down  Which of the following makes your symptoms better?: nothing  Risk factors for lung disease: animal exposure, smoking/tobacco exposure    Pulmonary history:    Childhood lung disease/premature birth: No    Family hx of lung disease: No  Autoimmune history:    Prior rheumatologic diagnosis: No      Rash: No eczema    Dysphagia/Reflux: controlled    Exposure history:    Exposure to pets/birds/farm animals: 2 cats, 1 dog    Mold/Roaches: Unsure about mold. No roaches/mice    Social history:    Smoking: Never smoker.  + second hand smoke her whole life    Alcohol, ilicit drugs (inhalational/ IV): No drugs.  No alcohol.    Worked as: Works [employer] at [employer]      Review of Systems   Constitutional:  Negative for appetite change, chills and fever.   HENT:  Negative for ear pain, postnasal drip, rhinorrhea, sneezing, sore throat and trouble swallowing.    Eyes:  Negative for pain and visual disturbance.   Respiratory:  Positive for cough, shortness of breath and wheezing.    Cardiovascular:  Negative for chest pain and palpitations.   Gastrointestinal:  Negative for abdominal pain and vomiting.   Genitourinary:  Negative for dysuria and hematuria.   Musculoskeletal:  Negative for arthralgias, back pain and myalgias.   Skin:  Negative for color change and rash.   Neurological:  Positive for headaches. Negative for seizures and syncope.   All other systems reviewed and are negative.          Historical Information   Past Medical History:   Diagnosis Date    GERD (gastroesophageal reflux disease)     MRSA infection     bladder/ urine    Personal history of COVID-19 01/20/2022 12/03/2021 - has lingering wheezing [facility]      Post-acute sequelae of COVID-19 ([facility])  "2022     Past Surgical History:   Procedure Laterality Date     SECTION      EYE SURGERY      strabismus    TOOTH EXTRACTION       Family History   Problem Relation Age of Onset    COPD Mother     Colon polyps Mother     Diabetes Father     Colon polyps Father     No Known Problems Sister     No Known Problems Brother     No Known Problems Son     No Known Problems Daughter     No Known Problems Brother     No Known Problems Brother     No Known Problems Brother     No Known Problems Sister     Gallbladder disease Sister              Meds/Allergies     Current Outpatient Medications:     Azelastine HCl 137 MCG/SPRAY SOLN, instill 1 spray into each nostril twice a day, Disp: 30 mL, Rfl: 1    budesonide-formoterol (Symbicort) 80-4.5 MCG/ACT inhaler, Inhale 2 puffs 2 (two) times a day Rinse mouth after use., Disp: 10.2 g, Rfl: 3    famotidine (PEPCID) 20 mg tablet, take 1 tablet by mouth at bedtime, Disp: 30 tablet, Rfl: 2    levonorgestrel (MIRENA) 20 MCG/24HR IUD, 1 each by Intrauterine route once, Disp: , Rfl:     levothyroxine 75 mcg tablet, swallow 1 tablet once daily, Disp: 90 tablet, Rfl: 1    metFORMIN (GLUCOPHAGE) 500 mg tablet, take 1 tablet by mouth twice a day with meals, Disp: 60 tablet, Rfl: 5    omeprazole (PriLOSEC) 40 MG capsule, Take 1 capsule (40 mg total) by mouth daily before lunch, Disp: 90 capsule, Rfl: 1  Allergies   Allergen Reactions    Blackberry Flavor - Food Allergy Hives    Shellfish-Derived Products - Food Allergy     Tetanus Toxoid Hives and Edema       Vitals: Blood pressure 170/90, pulse 103, temperature 98.3 °F (36.8 °C), temperature source Temporal, resp. rate 18, height 5' 8\" (1.727 m), weight (!) 154 kg (339 lb), SpO2 98%. Body mass index is 51.54 kg/m². Oxygen Therapy  SpO2: 98 %      Physical Exam  Physical Exam  Vitals and nursing note reviewed.   Constitutional:       General: She is not in acute distress.     Appearance: She is well-developed. She is obese. " "  HENT:      Head: Normocephalic and atraumatic.      Mouth/Throat:      Comments: Mallampatti IV  Eyes:      Conjunctiva/sclera: Conjunctivae normal.   Cardiovascular:      Rate and Rhythm: Normal rate and regular rhythm.      Heart sounds: No murmur heard.  Pulmonary:      Effort: Pulmonary effort is normal. No respiratory distress.      Breath sounds: Normal breath sounds.   Abdominal:      Palpations: Abdomen is soft.      Tenderness: There is no abdominal tenderness.   Musculoskeletal:         General: No swelling.      Cervical back: Neck supple.   Skin:     General: Skin is warm and dry.      Capillary Refill: Capillary refill takes less than 2 seconds.   Neurological:      Mental Status: She is alert.   Psychiatric:         Mood and Affect: Mood normal.         Labs:   I have personally reviewed pertinent lab results.    ABG: No results found for: \"PHART\", \"ICW8ZSV\", \"PO2ART\", \"TIS5DSN\", \"X3KVIDIE\", \"BEART\", \"SOURCE\",   BNP: No results found for: \"BNP\",   CBC:  Lab Results   Component Value Date    WBC 9.66 09/10/2024    HGB 11.9 09/10/2024    HCT 36.8 09/10/2024    MCV 79 (L) 09/10/2024     09/10/2024    EOSPCT 3 09/10/2024    EOSABS 0.28 09/10/2024    NEUTOPHILPCT 59 09/10/2024    LYMPHOPCT 32 09/10/2024   ,   CMP:   Lab Results   Component Value Date    SODIUM 136 09/10/2024    K 4.3 09/10/2024     09/10/2024    CO2 23 09/10/2024    BUN 12 09/10/2024    CREATININE 0.75 09/10/2024    CALCIUM 9.2 09/10/2024    AST 17 09/10/2024    ALT 14 09/10/2024    ALKPHOS 117 (H) 09/10/2024    EGFR 102 09/10/2024   ,   PT/INR: No results found for: \"PT\", \"INR\",   Troponin: No results found for: \"TROPONINI\"      Imaging and other studies: I have personally reviewed pertinent reports.   and I have personally reviewed pertinent films in PACS    CXR 10/2023   Lungs clear    Pulmonary function testing:     Pulmonary Functions Testing Results: 2022  Results:  FEV1/FVC Ratio:  85%, FEV1 2.89 L/81%, FVC 3.4 L/78%   "   Lung volumes by body plethysmography: TLC 79%, RV 68%, DLCO 86%     MVV 96% of predicted  MEP (90 cmH2O) 59% of predicted  MIP (-139 cmH2O)160% of predicted     Interpretation:  Spirometer shows no obstructive ventilatory defect   No significant response after administration of bronchodilator according to the ats standards  Normal lung volumes   Normal diffusion capacity   Normal airway resistance   The maximal respiratory pressures are within normal limits, excluding clinically significant respiratory muscle dysfunction       EKG, Pathology, and Other Studies: I have personally reviewed pertinent reports.         David Foley M.D.  [facility]

## 2025-01-08 ENCOUNTER — TELEPHONE (OUTPATIENT)
Dept: FAMILY MEDICINE CLINIC | Facility: CLINIC | Age: 40
End: 2025-01-08

## 2025-01-11 DIAGNOSIS — K21.9 GASTROESOPHAGEAL REFLUX DISEASE WITHOUT ESOPHAGITIS: ICD-10-CM

## 2025-01-13 RX ORDER — OMEPRAZOLE 40 MG/1
CAPSULE, DELAYED RELEASE ORAL
Qty: 90 CAPSULE | Refills: 1 | Status: SHIPPED | OUTPATIENT
Start: 2025-01-13

## 2025-01-29 ENCOUNTER — HOSPITAL ENCOUNTER (OUTPATIENT)
Dept: SLEEP CENTER | Facility: HOSPITAL | Age: 40
Discharge: HOME | End: 2025-01-29
Payer: COMMERCIAL

## 2025-01-29 DIAGNOSIS — G47.19 EXCESSIVE DAYTIME SLEEPINESS: ICD-10-CM

## 2025-01-29 DIAGNOSIS — R06.83 SNORING: ICD-10-CM

## 2025-01-29 PROCEDURE — G0399 HOME SLEEP TEST/TYPE 3 PORTA: HCPCS

## 2025-01-29 NOTE — PROGRESS NOTES
Home Sleep Study Documentation    HOME STUDY DEVICE: Noxturnal no                                           Briseida G3 yes device # 26      Pre-Sleep Home Study:    Set-up and instructions performed by: Namita    Technician performed demonstration for Patient: yes    Return demonstration performed by Patient: yes    Written instructions provided to Patient: yes    Patient signed consent form: yes        Post-Sleep Home Study:    Additional comments by Patient: None    Home Sleep Study Failed:no:    Failure reason: N/A    Reported or Detected: N/A    Scored by: MAURICIO Clancy

## 2025-01-31 PROBLEM — G47.33 OSA (OBSTRUCTIVE SLEEP APNEA): Status: ACTIVE | Noted: 2025-01-31

## 2025-02-03 PROCEDURE — 95806 SLEEP STUDY UNATT&RESP EFFT: CPT | Performed by: INTERNAL MEDICINE

## 2025-02-05 ENCOUNTER — TELEPHONE (OUTPATIENT)
Dept: PULMONOLOGY | Facility: CLINIC | Age: 40
End: 2025-02-05

## 2025-02-05 ENCOUNTER — RESULTS FOLLOW-UP (OUTPATIENT)
Dept: PULMONOLOGY | Facility: CLINIC | Age: 40
End: 2025-02-05

## 2025-02-05 DIAGNOSIS — G47.33 OSA (OBSTRUCTIVE SLEEP APNEA): Primary | ICD-10-CM

## 2025-02-05 NOTE — RESULT ENCOUNTER NOTE
[facility] please process the Auto CPAP order, let patient know, and relay the [employer] message I sent with this result to make sure she is aware.

## 2025-02-07 ENCOUNTER — RA CDI HCC (OUTPATIENT)
Dept: OTHER | Facility: HOSPITAL | Age: 40
End: 2025-02-07

## 2025-02-09 DIAGNOSIS — R05.3 CHRONIC COUGH: ICD-10-CM

## 2025-02-09 DIAGNOSIS — J34.89 NASAL DRAINAGE: ICD-10-CM

## 2025-02-09 DIAGNOSIS — E03.9 HYPOTHYROIDISM, UNSPECIFIED TYPE: ICD-10-CM

## 2025-02-11 RX ORDER — AZELASTINE HYDROCHLORIDE 137 UG/1
1 SPRAY, METERED NASAL 2 TIMES DAILY
Qty: 30 ML | Refills: 1 | Status: SHIPPED | OUTPATIENT
Start: 2025-02-11

## 2025-02-11 RX ORDER — LEVOTHYROXINE SODIUM 75 UG/1
75 TABLET ORAL DAILY
Qty: 90 TABLET | Refills: 1 | Status: SHIPPED | OUTPATIENT
Start: 2025-02-11

## 2025-02-14 ENCOUNTER — OFFICE VISIT (OUTPATIENT)
Dept: FAMILY MEDICINE CLINIC | Facility: CLINIC | Age: 40
End: 2025-02-14
Payer: COMMERCIAL

## 2025-02-14 ENCOUNTER — APPOINTMENT (OUTPATIENT)
Dept: LAB | Facility: CLINIC | Age: 40
End: 2025-02-14
Payer: COMMERCIAL

## 2025-02-14 VITALS
BODY MASS INDEX: 44.41 KG/M2 | TEMPERATURE: 97.3 F | HEART RATE: 95 BPM | OXYGEN SATURATION: 99 % | DIASTOLIC BLOOD PRESSURE: 90 MMHG | SYSTOLIC BLOOD PRESSURE: 150 MMHG | WEIGHT: 293 LBS | HEIGHT: 68 IN

## 2025-02-14 DIAGNOSIS — J45.909 UNCOMPLICATED ASTHMA, UNSPECIFIED ASTHMA SEVERITY, UNSPECIFIED WHETHER PERSISTENT: ICD-10-CM

## 2025-02-14 DIAGNOSIS — I10 ESSENTIAL HYPERTENSION: ICD-10-CM

## 2025-02-14 DIAGNOSIS — E03.9 HYPOTHYROIDISM, UNSPECIFIED TYPE: ICD-10-CM

## 2025-02-14 DIAGNOSIS — I10 ESSENTIAL HYPERTENSION: Primary | ICD-10-CM

## 2025-02-14 DIAGNOSIS — E66.01 MORBID OBESITY (HCC): ICD-10-CM

## 2025-02-14 DIAGNOSIS — M25.561 CHRONIC PAIN OF BOTH KNEES: ICD-10-CM

## 2025-02-14 DIAGNOSIS — M25.562 CHRONIC PAIN OF BOTH KNEES: ICD-10-CM

## 2025-02-14 DIAGNOSIS — G89.29 CHRONIC PAIN OF BOTH KNEES: ICD-10-CM

## 2025-02-14 DIAGNOSIS — R73.03 PREDIABETES: ICD-10-CM

## 2025-02-14 LAB
ANION GAP SERPL CALCULATED.3IONS-SCNC: 10 MMOL/L (ref 4–13)
BUN SERPL-MCNC: 11 MG/DL (ref 5–25)
CALCIUM SERPL-MCNC: 9.6 MG/DL (ref 8.4–10.2)
CHLORIDE SERPL-SCNC: 101 MMOL/L (ref 96–108)
CO2 SERPL-SCNC: 26 MMOL/L (ref 21–32)
CREAT SERPL-MCNC: 0.77 MG/DL (ref 0.6–1.3)
EST. AVERAGE GLUCOSE BLD GHB EST-MCNC: 128 MG/DL
GFR SERPL CREATININE-BSD FRML MDRD: 98 ML/MIN/1.73SQ M
GLUCOSE SERPL-MCNC: 104 MG/DL (ref 65–140)
HBA1C MFR BLD: 6.1 %
POTASSIUM SERPL-SCNC: 4.4 MMOL/L (ref 3.5–5.3)
SODIUM SERPL-SCNC: 137 MMOL/L (ref 135–147)

## 2025-02-14 PROCEDURE — 99214 OFFICE O/P EST MOD 30 MIN: CPT | Performed by: FAMILY MEDICINE

## 2025-02-14 PROCEDURE — 83036 HEMOGLOBIN GLYCOSYLATED A1C: CPT

## 2025-02-14 PROCEDURE — 80048 BASIC METABOLIC PNL TOTAL CA: CPT

## 2025-02-14 PROCEDURE — 36415 COLL VENOUS BLD VENIPUNCTURE: CPT

## 2025-02-14 NOTE — PROGRESS NOTES
Name: Shelley Tineo      : 1985      MRN: [id number]  Encounter Provider: Tamica Foster DO  Encounter Date: 2025   Encounter department: [facility]  :  Assessment & Plan  Essential hypertension  No previous hx HTN, but has had several visits now where BP hypertensive - new dx HTN - will check BMP and EKG and then choose antihypertensive med to start - would prefer beta-blocker given pt's chronic HA's -BB possibly would benefit both  I also advised pt to cut back on caffeinated beverages  Orders:    ECG 12 lead; Future    Basic metabolic panel; Future    Prediabetes  Continue metformin  Orders:    metFORMIN (GLUCOPHAGE) 500 mg tablet; Take 1 tablet (500 mg total) by mouth 2 (two) times a day with meals    ECG 12 lead; Future    Basic metabolic panel; Future    Hemoglobin A1C; Future    Morbid obesity (HCC)  Pt has been losing weight, she has upcoming appt with weight management as well    Orders:    metFORMIN (GLUCOPHAGE) 500 mg tablet; Take 1 tablet (500 mg total) by mouth 2 (two) times a day with meals    ECG 12 lead; Future    Hypothyroidism, unspecified type  cpm       Uncomplicated asthma, unspecified asthma severity, unspecified whether persistent  cpm       Chronic pain of both knees  Failed PT trial  Orders:    Ambulatory Referral to Orthopedic Surgery; Future      Chief Complaint   Patient presents with    Follow-up     Needs refills          History of Present Illness   Scheduled f/u  Lost under half a pound since starting metformin in October - rx'd for prediabetes + morbid obesity - still goes to gym 2 days a week, had been doing PT for knees 2x a week until completed in November   Weight management had to reschedule her appt from last month to April  Did not start vitamin D - will do so  BP on rooming 150/90 - even higher on recheck -  no hx HTN, but on prior visits has had Elevated blood pressure readings in office  States will be picking up a CPAP machine on the  "24th- rx'd by pulmonologist for KAMLA - states also her BP spiked during the sleep test  Gets chronic headaches  Drinks a can of mountain dew every day in addition to coffee      10/4/2024  [facility]  Assessment & Plan  Prediabetes  We are starting trial metformin today for weight loss as well as glycemic control benefit; pt's HbA1c tipped just into diabetes range at 6.5% one time this recent result, has been prediabetic for awhile; Recheck HbA1c in 3 months, if second HbA1c result  > = 6.5%, then would assign new diabetes dx, but for now still assessing as prediabetic  Orders:  ·  metFORMIN (GLUCOPHAGE) 500 mg tablet; Take 1 tablet (500 mg total) by mouth 2 (two) times a day with meals  Morbid obesity (HCC)  Start trial metformin for weight loss; pt also has prediabetes  Orders:  ·  metFORMIN (GLUCOPHAGE) 500 mg tablet; Take 1 tablet (500 mg total) by mouth 2 (two) times a day with meals  Low HDL (under 40)  Recheck lipids in 6 months  Hypothyroidism, unspecified type  Recheck TFT's in 6 months  Vitamin D insufficiency  OTC advised              Review of Systems   Constitutional: Negative.    Respiratory: Negative.     Cardiovascular: Negative.    Neurological: Negative.    Hematological: Negative.        Objective   /90 (BP Location: Left arm, Patient Position: Sitting, Cuff Size: Standard)   Pulse 95   Temp (!) 97.3 °F (36.3 °C) (Tympanic)   Ht 5' 8\" (1.727 m)   Wt (!) 156 kg (343 lb)   SpO2 99%   BMI 52.15 kg/m²      Physical Exam  Vitals and nursing note reviewed.   Constitutional:       General: She is not in acute distress.     Appearance: She is well-groomed. She is morbidly obese. She is not ill-appearing, toxic-appearing or diaphoretic.   HENT:      Head: Normocephalic and atraumatic.      Nose: Nose normal.      Mouth/Throat:      Lips: Pink.      Mouth: Mucous membranes are moist.      Pharynx: Oropharynx is clear.   Eyes:      General: Lids are normal.      " Conjunctiva/sclera: Conjunctivae normal.      Pupils: Pupils are equal, round, and reactive to light.   Neck:      Vascular: No JVD.   Cardiovascular:      Rate and Rhythm: Normal rate and regular rhythm.      Pulses: Normal pulses.      Heart sounds: Normal heart sounds.   Pulmonary:      Effort: Pulmonary effort is normal.      Breath sounds: Normal breath sounds and air entry.   Abdominal:      General: Abdomen is protuberant. Bowel sounds are normal.      Palpations: Abdomen is soft. There is no hepatomegaly, splenomegaly or mass.      Tenderness: There is no abdominal tenderness.   Musculoskeletal:      Right lower leg: No edema.      Left lower leg: No edema.   Skin:     Coloration: Skin is not pale.   Neurological:      General: No focal deficit present.      Mental Status: She is alert and oriented to person, place, and time.   Psychiatric:         Mood and Affect: Mood normal.         Behavior: Behavior normal. Behavior is cooperative.         Cognition and Memory: Cognition and memory normal.

## 2025-02-14 NOTE — ASSESSMENT & PLAN NOTE
No previous hx HTN, but has had several visits now where BP hypertensive - new dx HTN - will check BMP and EKG and then choose antihypertensive med to start - would prefer beta-blocker given pt's chronic HA's -BB possibly would benefit both  I also advised pt to cut back on caffeinated beverages  Orders:    ECG 12 lead; Future    Basic metabolic panel; Future

## 2025-02-14 NOTE — ASSESSMENT & PLAN NOTE
Pt has been losing weight, she has upcoming appt with weight management as well    Orders:    metFORMIN (GLUCOPHAGE) 500 mg tablet; Take 1 tablet (500 mg total) by mouth 2 (two) times a day with meals    ECG 12 lead; Future

## 2025-02-14 NOTE — ASSESSMENT & PLAN NOTE
Continue metformin  Orders:    metFORMIN (GLUCOPHAGE) 500 mg tablet; Take 1 tablet (500 mg total) by mouth 2 (two) times a day with meals    ECG 12 lead; Future    Basic metabolic panel; Future    Hemoglobin A1C; Future

## 2025-02-17 ENCOUNTER — RESULTS FOLLOW-UP (OUTPATIENT)
Dept: FAMILY MEDICINE CLINIC | Facility: CLINIC | Age: 40
End: 2025-02-17

## 2025-02-18 NOTE — RESULT ENCOUNTER NOTE
Called and left VM for pt to return call. When call is returned, please advise pt that per Dr. Foster her labs have improved and please remind her to obtain EKG so that she can be started on an appropriate antihypertensive medication.

## 2025-02-21 ENCOUNTER — APPOINTMENT (OUTPATIENT)
Dept: RADIOLOGY | Facility: CLINIC | Age: 40
End: 2025-02-21
Payer: COMMERCIAL

## 2025-02-21 ENCOUNTER — OFFICE VISIT (OUTPATIENT)
Dept: OBGYN CLINIC | Facility: CLINIC | Age: 40
End: 2025-02-21
Payer: COMMERCIAL

## 2025-02-21 VITALS — WEIGHT: 293 LBS | HEIGHT: 68 IN | BODY MASS INDEX: 44.41 KG/M2

## 2025-02-21 DIAGNOSIS — M25.561 CHRONIC PAIN OF BOTH KNEES: ICD-10-CM

## 2025-02-21 DIAGNOSIS — M22.8X2 MALTRACKING OF LEFT PATELLA: ICD-10-CM

## 2025-02-21 DIAGNOSIS — M25.562 CHRONIC PAIN OF BOTH KNEES: ICD-10-CM

## 2025-02-21 DIAGNOSIS — G89.29 CHRONIC PAIN OF BOTH KNEES: ICD-10-CM

## 2025-02-21 DIAGNOSIS — M22.8X1 MALTRACKING OF RIGHT PATELLA: Primary | ICD-10-CM

## 2025-02-21 DIAGNOSIS — M54.16 LUMBAR RADICULOPATHY: ICD-10-CM

## 2025-02-21 PROCEDURE — 73562 X-RAY EXAM OF KNEE 3: CPT

## 2025-02-21 PROCEDURE — 99204 OFFICE O/P NEW MOD 45 MIN: CPT | Performed by: STUDENT IN AN ORGANIZED HEALTH CARE EDUCATION/TRAINING PROGRAM

## 2025-02-21 RX ORDER — MELOXICAM 15 MG/1
15 TABLET ORAL DAILY
Qty: 30 TABLET | Refills: 0 | Status: SHIPPED | OUTPATIENT
Start: 2025-02-21

## 2025-02-21 NOTE — ASSESSMENT & PLAN NOTE
Orders:    XR knee 3 vw right non injury; Future    XR knee 3 vw left non injury; Future    Ambulatory Referral to Orthopedic Surgery

## 2025-02-21 NOTE — PROGRESS NOTES
Ortho Sports Medicine Knee New Patient Visit     Assessment & Plan  Chronic pain of both knees    Orders:    XR knee 3 vw right non injury; Future    XR knee 3 vw left non injury; Future    Ambulatory Referral to Orthopedic Surgery    Maltracking of left patella    Orders:    Ambulatory Referral to Physical Therapy; Future    meloxicam (Mobic) 15 mg tablet; Take 1 tablet (15 mg total) by mouth daily    Maltracking of right patella    Orders:    Ambulatory Referral to Physical Therapy; Future    meloxicam (Mobic) 15 mg tablet; Take 1 tablet (15 mg total) by mouth daily    Lumbar radiculopathy    Orders:    Ambulatory referral to Spine & Pain Management; Future    I reviewed the history, exam, imaging with the patient in clinic today.  I did review the patient's radiographs that show no evidence of fracture or significant degenerative changes.  On exam, the patient does have crepitus under the patella with range of motion.  She does endorse anterior knee pain worse with stairs and sitting for long periods of time.  I discussed with the patient and her symptoms likely due to patella maltracking patella chondromalacia.  I discussed potential treatment options with the patient focusing on conservative management.  Patient has tried physical therapy in the fall without significant improvement.  I did recommend another trial of physical therapy focused on patella maltracking which would address both the hip and the weakness.  Also provided her with a prescription for meloxicam for 1 month.  Patient did endorse numbness and tingling radiating down her left leg.  Discussed this condition be due to lumbar radiculopathy and did refer her to spine pain for evaluation as well.  Recommend the patient follow-up in 6 weeks for repeat evaluation. The patient demonstrated understanding of the discussion and was in agreement with the plan.  All of the questions were answered.  Patient can reach out to clinic with any questions or  concerns at any time.            Chief Complaint   Patient presents with    Right Knee - Pain    Left Knee - Pain       History of Present Illness:  The patient is a 40 y.o. female seen in clinic for bilateral knee pain.  Patient states that she has had pain for approximately 3 years.  She denies any specific injury inciting them.  She does note that she has had frequent falls over that time period.  Feels the pain over the anterior aspect of the knee.  She states that the knee locks up but does not swell or buckle.  She states her symptoms are aggravated with stairs and sitting for long periods of time.  She takes ibuprofen and Tylenol for pain control.  She did take physical therapy in November and transition to home exercises.  She denies any prior surgeries.  She does state that she does have numbness and tingling from the hip down to her ankle.  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 10.2 g 3    famotidine (PEPCID) 20 mg tablet take 1 tablet by mouth at bedtime 30 tablet 2    levonorgestrel (MIRENA) 20 MCG/24HR IUD 1 each by Intrauterine route once      levothyroxine  75 mcg tablet swallow 1 tablet once daily 90 tablet 1    metFORMIN (GLUCOPHAGE) 500 mg tablet Take 1 tablet (500 mg total) by mouth 2 (two) times a day with meals 60 tablet 5    omeprazole (PriLOSEC) 40 MG capsule take 1 capsule by mouth once daily BEFORE LUNCH 90 capsule 1     No current facility-administered medications on file prior to visit.     Social History     Socioeconomic History    Marital status: /Civil Union     Spouse name: Not on file    Number of children: Not on file    Years of education: Not on file    Highest education level: Not on file   Occupational History    Not on file   Tobacco Use    Smoking status: Never    Smokeless tobacco: Never   Vaping Use    Vaping status: Never Used   Substance and Sexual Activity    Alcohol use: Not Currently     Comment: rare    Drug use: Never    Sexual activity: Yes     Partners: Male     Birth control/protection: I.U.D.   Other Topics Concern    Not on file   Social History Narrative    , 3 children    Works at [employer]     Financial Resource Strain: Not on file   Food Insecurity: Not on file   Transportation Needs: Not on file   Physical Activity: Not on file   Stress: Not on file   Social Connections: Not on file   Intimate Partner Violence: Not on file   Housing Stability: Not on file         I have reviewed the past medical, surgical, social and family history, medications and allergies as documented in the EMR.       Physical Exam:    Focused bilateral knee exam:  Ambulates with a normal gait.    Inspection:  - Skin intact  - Ecchymosis: no  - Erythema: no  - Gross deformity: no  - Previous surgical incisions: no  - Effusion: negative    Palpation:  - Medial patellar facet: no  - Lateral patellar facet: no  - Tibial tubercle: no  - Patella tendon: no  - Pes anserine bursa: no  - Gerdy's tubercle: no  - Popliteal fossa: no    - Lateral epicondyle: no  - Medial epicondyle: no  - Posterior calf: no    Range of  motion:  - Extension: 0 degrees  - Flexion: 120 degrees  - Pain with hyperflexion: yes  - Pain with hyperextension: no    Strength:  - Patient able to perform a straight leg raise  - Hip flexion: 5/5  - Knee extension: 5/5  - Knee flexion: 5/5  - Ankle DF: 5/5  - Ankle PF: 5/5    Meniscus:  - Medial joint line tenderness: no  - Lateral joint line tenderness: no  - Nasrin's: no  - Flexion compression: no  - Thessaly test: n/a    Collateral ligaments:  -  Varus laxity at full extension: no  -  Varus laxity at full in 30° of knee flexion: no  -  Valgus laxity at full extension: no  -  Valgus laxity at full in 30° of knee flexion: no    Cruciate ligaments:  - Lachman: 1A  - Pivot shift test: no  - Anterior drawer: 1A  - Posterior drawer: 1A  - Posterior tibial sag: no    Patella:  - Apprehension with lateral patellar translation: no  - Able to sublux 2 quadrant with firm endpoint  - Apprehension with medial patellar translation: no  - Able to sublux 2 quadrant with firm endpoint  - J sign: no  - Patella grind test: yes  - Patella crepitus: yes  - Patella tilt: no  - Squat test: n/a    Range of motion testing of the hip and ankle are within normal limits.    No calf tenderness to palpation bilaterally. Negative Jose test    LE NV Exam:   +2 DP/PT pulses bilaterally  Sensation intact to light touch L2-S1 bilaterally, SPN/DPN/tibial/saphenous/sural nerve distributions  Motor intact in SPN/DPN/TA nerve distributions       Knee Imaging    X-rays of the bilateral knee were obtained on 2/21/2025 and reviewed with the patient. Per my independent review, the imaging shows no fracture, dislocation, or degenerative disease.        Scribe Attestation      I,:  Segun Montes PA-C am acting as a scribe while in the presence of the attending physician.:       I,:  Maurilio Hughes MD personally performed the services described in this documentation    as scribed in my presence.:

## 2025-02-24 ENCOUNTER — TELEPHONE (OUTPATIENT)
Age: 40
End: 2025-02-24

## 2025-02-24 NOTE — TELEPHONE ENCOUNTER
Pt. came in to the [facility]'s office to be set up on Auto PAP. I gave a ResMed S11 set at 5-15cm and gave an AirFit P30I (s) mask. I turn on pressure with mask fit. We discussed cleaning, resupply and compliance. Opal downloaded, S/n # [id number]  d/n# [id number]

## 2025-02-28 ENCOUNTER — OFFICE VISIT (OUTPATIENT)
Dept: URGENT CARE | Facility: CLINIC | Age: 40
End: 2025-02-28
Payer: COMMERCIAL

## 2025-02-28 VITALS
HEART RATE: 87 BPM | DIASTOLIC BLOOD PRESSURE: 90 MMHG | OXYGEN SATURATION: 98 % | RESPIRATION RATE: 18 BRPM | SYSTOLIC BLOOD PRESSURE: 140 MMHG | TEMPERATURE: 97.9 F

## 2025-02-28 DIAGNOSIS — J20.9 ACUTE BRONCHITIS, UNSPECIFIED ORGANISM: Primary | ICD-10-CM

## 2025-02-28 PROCEDURE — 99213 OFFICE O/P EST LOW 20 MIN: CPT | Performed by: PHYSICIAN ASSISTANT

## 2025-02-28 PROCEDURE — 94640 AIRWAY INHALATION TREATMENT: CPT | Performed by: PHYSICIAN ASSISTANT

## 2025-02-28 PROCEDURE — 96372 THER/PROPH/DIAG INJ SC/IM: CPT | Performed by: PHYSICIAN ASSISTANT

## 2025-02-28 RX ORDER — IPRATROPIUM BROMIDE AND ALBUTEROL SULFATE 2.5; .5 MG/3ML; MG/3ML
3 SOLUTION RESPIRATORY (INHALATION) ONCE
Status: COMPLETED | OUTPATIENT
Start: 2025-02-28 | End: 2025-02-28

## 2025-02-28 RX ORDER — METHYLPREDNISOLONE SODIUM SUCCINATE 125 MG/2ML
125 INJECTION, POWDER, LYOPHILIZED, FOR SOLUTION INTRAMUSCULAR; INTRAVENOUS ONCE
Status: COMPLETED | OUTPATIENT
Start: 2025-02-28 | End: 2025-02-28

## 2025-02-28 RX ORDER — PREDNISONE 10 MG/1
TABLET ORAL
Qty: 26 TABLET | Refills: 0 | Status: SHIPPED | OUTPATIENT
Start: 2025-02-28

## 2025-02-28 RX ORDER — AZITHROMYCIN 250 MG/1
TABLET, FILM COATED ORAL
Qty: 6 TABLET | Refills: 0 | Status: SHIPPED | OUTPATIENT
Start: 2025-02-28 | End: 2025-03-04

## 2025-02-28 RX ADMIN — METHYLPREDNISOLONE SODIUM SUCCINATE 125 MG: 125 INJECTION, POWDER, LYOPHILIZED, FOR SOLUTION INTRAMUSCULAR; INTRAVENOUS at 13:38

## 2025-02-28 RX ADMIN — IPRATROPIUM BROMIDE AND ALBUTEROL SULFATE 3 ML: 2.5; .5 SOLUTION RESPIRATORY (INHALATION) at 13:36

## 2025-02-28 NOTE — PATIENT INSTRUCTIONS
I have prescribed an antibiotic for the infection.  Please take the antibiotic as prescribed and finish the entire prescription.  Take an over the counter probiotic or eat yogurt with live cultures in it (activia) to keep good bacteria in the gut and help prevent diarrhea.  Wash hands frequently to prevent the spread of infection.  Can use over the counter cough and cold medications to help with symptoms.  Ibuprofen and/or tylenol as needed for pain or fever.  If not improving over the next 7-10 days, follow up with PCP.      Prednisone as directed  Albuterol inhaler as needed

## 2025-02-28 NOTE — PROGRESS NOTES
[facility]    NAME: Shelley Tineo is a 40 y.o. female  : 1985    MRN: [id number]  DATE: 2025  TIME: 2:05 PM    Assessment and Plan   Acute bronchitis, unspecified organism [J20.9]  1. Acute bronchitis, unspecified organism  ipratropium-albuterol (DUO-NEB) 0.5-2.5 mg/3 mL inhalation solution 3 mL    azithromycin (ZITHROMAX) 250 mg tablet    predniSONE 10 mg tablet    methylPREDNISolone sodium succinate (Solu-MEDROL) injection 125 mg      Mini neb    Performed by: Michelle Behler, PA-C  Authorized by: Michelle Behler, PA-C    Number of treatments:  1  Treatment 1:   Pre-Procedure     Symptoms:  Wheezing, cough and shortness of breath    Lung Sounds:  Wheezing    HR:  87    SP02:  98    Medication Administered:  Duoneb - Albuterol 2.5 mg/Atrovent 0.5 mg  Post-Procedure     Symptoms:  Cough and wheezing    Lung sounds:  Improved wheezing    HR:  88    SP02:  98        Patient Instructions     Patient Instructions   I have prescribed an antibiotic for the infection.  Please take the antibiotic as prescribed and finish the entire prescription.  Take an over the counter probiotic or eat yogurt with live cultures in it (activia) to keep good bacteria in the gut and help prevent diarrhea.  Wash hands frequently to prevent the spread of infection.  Can use over the counter cough and cold medications to help with symptoms.  Ibuprofen and/or tylenol as needed for pain or fever.  If not improving over the next 7-10 days, follow up with PCP.      Prednisone as directed  Albuterol inhaler as needed    Chief Complaint     Chief Complaint   Patient presents with   • Cough     And congestion started over 3 week ago nothing helping        History of Present Illness   40-year-old female here with complaint of chest congestion and wheezing.  Been present for the last 3 weeks is getting progressively worse.  Cough is productive.  No fever or chills.  She has been using albuterol inhaler with minimal  relief.      Review of Systems   Review of Systems   Constitutional:  Negative for appetite change, chills and fever.   HENT:  Negative for congestion, ear discharge, ear pain, facial swelling, postnasal drip, sinus pressure, sneezing and sore throat.    Respiratory:  Positive for cough, chest tightness, shortness of breath and wheezing.    Neurological:  Negative for headaches.       Current Medications     Current Outpatient Medications:   •  azithromycin (ZITHROMAX) 250 mg tablet, Take 2 tablets today then 1 tablet daily x 4 days, Disp: 6 tablet, Rfl: 0  •  predniSONE 10 mg tablet, Take 3 tabs BID X 2 days, 2 tabs BID X 2 days, 1 tab BID X 2 days, 1 tab daily X 2 days, Disp: 26 tablet, Rfl: 0  •  Azelastine HCl 137 MCG/SPRAY SOLN, instill 1 spray into each nostril twice a day, Disp: 30 mL, Rfl: 1  •  budesonide-formoterol (Symbicort) 80-4.5 MCG/ACT inhaler, Inhale 2 puffs 2 (two) times a day Rinse mouth after use., Disp: 10.2 g, Rfl: 3  •  famotidine (PEPCID) 20 mg tablet, take 1 tablet by mouth at bedtime, Disp: 30 tablet, Rfl: 2  •  levonorgestrel (MIRENA) 20 MCG/24HR IUD, 1 each by Intrauterine route once, Disp: , Rfl:   •  levothyroxine 75 mcg tablet, swallow 1 tablet once daily, Disp: 90 tablet, Rfl: 1  •  meloxicam (Mobic) 15 mg tablet, Take 1 tablet (15 mg total) by mouth daily, Disp: 30 tablet, Rfl: 0  •  metFORMIN (GLUCOPHAGE) 500 mg tablet, Take 1 tablet (500 mg total) by mouth 2 (two) times a day with meals, Disp: 60 tablet, Rfl: 5  •  omeprazole (PriLOSEC) 40 MG capsule, take 1 capsule by mouth once daily BEFORE LUNCH, Disp: 90 capsule, Rfl: 1  No current facility-administered medications for this visit.    Current Allergies     Allergies as of 02/28/2025 - Reviewed 02/28/2025   Allergen Reaction Noted   • Blackberry flavor - food allergy Hives 09/23/2021   • Shellfish-derived products - food allergy  09/26/2019   • Tetanus toxoid Hives and Edema 02/08/2016          The following portions of the  patient's history were reviewed and updated as appropriate: allergies, current medications, past family history, past medical history, past social history, past surgical history and problem list.   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     Past Surgical History:   Procedure Laterality Date   •  SECTION     • EYE SURGERY      strabismus   • TOOTH EXTRACTION       Family History   Problem Relation Age of Onset   • COPD Mother    • Colon polyps Mother    • Diabetes Father    • Colon polyps Father    • No Known Problems Sister    • No Known Problems Brother    • No Known Problems Son    • No Known Problems Daughter    • No Known Problems Brother    • No Known Problems Brother    • No Known Problems Brother    • No Known Problems Sister    • Gallbladder disease Sister      Social History     Socioeconomic History   • Marital status: /Civil Union     Spouse name: Not on file   • Number of children: Not on file   • Years of education: Not on file   • Highest education level: Not on file   Occupational History   • Not on file   Tobacco Use   • Smoking status: Never   • Smokeless tobacco: Never   Vaping Use   • Vaping status: Never Used   Substance and Sexual Activity   • Alcohol use: Not Currently     Comment: rare   • Drug use: Never   • Sexual activity: Yes     Partners: Male     Birth control/protection: I.U.D.   Other Topics Concern   • Not on file   Social History Narrative    , 3 children    Works at [employer]     Social Drivers of Health     Financial Resource Strain: Not on file   Food Insecurity: Not on file   Transportation Needs: Not on file   Physical Activity: Not on file   Stress: Not on file   Social Connections: Not on file   Intimate Partner Violence: Not on file    Housing Stability: Not on file     Medications have been verified.    Objective   BP (!) 187/89   Pulse 87   Temp 97.9 °F (36.6 °C)   Resp 18   SpO2 98%      Physical Exam   Physical Exam  Vitals and nursing note reviewed.   Constitutional:       General: She is not in acute distress.     Appearance: She is well-developed.   HENT:      Head: Normocephalic and atraumatic.      Right Ear: Tympanic membrane normal.      Left Ear: Tympanic membrane normal.      Nose: Nose normal. No mucosal edema or rhinorrhea.      Right Sinus: No maxillary sinus tenderness or frontal sinus tenderness.      Left Sinus: No maxillary sinus tenderness or frontal sinus tenderness.      Mouth/Throat:      Pharynx: No oropharyngeal exudate or posterior oropharyngeal erythema.   Eyes:      Conjunctiva/sclera: Conjunctivae normal.   Cardiovascular:      Rate and Rhythm: Normal rate and regular rhythm.      Heart sounds: Normal heart sounds. No murmur heard.  Pulmonary:      Effort: Pulmonary effort is normal.      Breath sounds: Wheezing present.

## 2025-03-03 ENCOUNTER — DOCUMENTATION (OUTPATIENT)
Dept: ADMINISTRATIVE | Facility: OTHER | Age: 40
End: 2025-03-03

## 2025-03-03 NOTE — PROGRESS NOTES
Michelle Behler, PA-C  P Patient Reported Team         Blood pressure elevated  Appointment department: [facility]  Appointment provider: Michelle Behler, PA-C  Blood pressure  02/28/25 1328 (!) 187/89  02/28/25 1315 (!) [id number]    03/04/25 12:42 PM    Patient was called after the Urgent Care visit . Patient does not have a home BP machine to re-take BP. No appointment needed no symptoms has appointment with provider in a few months.    Thank you.  Wan Paulino MA  [facility]

## 2025-03-04 ENCOUNTER — TELEPHONE (OUTPATIENT)
Dept: FAMILY MEDICINE CLINIC | Facility: CLINIC | Age: 40
End: 2025-03-04

## 2025-03-04 NOTE — TELEPHONE ENCOUNTER
Called patient to advise her that we are still waiting on the EKG before any medications can be started and to track her BP at home if that is a possibility.

## 2025-03-05 ENCOUNTER — APPOINTMENT (OUTPATIENT)
Dept: LAB | Facility: HOSPITAL | Age: 40
End: 2025-03-05
Payer: COMMERCIAL

## 2025-03-05 ENCOUNTER — OFFICE VISIT (OUTPATIENT)
Dept: LAB | Facility: HOSPITAL | Age: 40
End: 2025-03-05
Payer: COMMERCIAL

## 2025-03-05 DIAGNOSIS — I10 ESSENTIAL HYPERTENSION: ICD-10-CM

## 2025-03-05 DIAGNOSIS — E66.01 MORBID OBESITY (HCC): ICD-10-CM

## 2025-03-05 DIAGNOSIS — R73.03 PREDIABETES: ICD-10-CM

## 2025-03-05 LAB
ATRIAL RATE: 81 BPM
P AXIS: -1 DEGREES
PR INTERVAL: 160 MS
QRS AXIS: 19 DEGREES
QRSD INTERVAL: 100 MS
QT INTERVAL: 370 MS
QTC INTERVAL: 430 MS
T WAVE AXIS: 22 DEGREES
VENTRICULAR RATE: 81 BPM

## 2025-03-05 PROCEDURE — 93010 ELECTROCARDIOGRAM REPORT: CPT | Performed by: INTERNAL MEDICINE

## 2025-03-05 PROCEDURE — 93005 ELECTROCARDIOGRAM TRACING: CPT

## 2025-03-06 ENCOUNTER — OFFICE VISIT (OUTPATIENT)
Dept: BARIATRICS | Facility: CLINIC | Age: 40
End: 2025-03-06
Payer: COMMERCIAL

## 2025-03-06 VITALS
OXYGEN SATURATION: 97 % | TEMPERATURE: 98.4 F | RESPIRATION RATE: 20 BRPM | HEIGHT: 68 IN | BODY MASS INDEX: 44.41 KG/M2 | SYSTOLIC BLOOD PRESSURE: 142 MMHG | DIASTOLIC BLOOD PRESSURE: 88 MMHG | WEIGHT: 293 LBS | HEART RATE: 95 BPM

## 2025-03-06 DIAGNOSIS — E03.9 HYPOTHYROIDISM, UNSPECIFIED TYPE: ICD-10-CM

## 2025-03-06 DIAGNOSIS — E11.9 TYPE 2 DIABETES MELLITUS WITHOUT COMPLICATION, WITHOUT LONG-TERM CURRENT USE OF INSULIN (HCC): ICD-10-CM

## 2025-03-06 DIAGNOSIS — E66.01 MORBID OBESITY (HCC): Primary | ICD-10-CM

## 2025-03-06 DIAGNOSIS — R73.03 PREDIABETES: ICD-10-CM

## 2025-03-06 DIAGNOSIS — I10 ESSENTIAL HYPERTENSION: Primary | ICD-10-CM

## 2025-03-06 DIAGNOSIS — G47.33 OSA (OBSTRUCTIVE SLEEP APNEA): ICD-10-CM

## 2025-03-06 PROCEDURE — 99204 OFFICE O/P NEW MOD 45 MIN: CPT | Performed by: PHYSICIAN ASSISTANT

## 2025-03-06 RX ORDER — HYDROCHLOROTHIAZIDE 25 MG/1
25 TABLET ORAL DAILY
Qty: 30 TABLET | Refills: 2 | Status: SHIPPED | OUTPATIENT
Start: 2025-03-06

## 2025-03-06 NOTE — ASSESSMENT & PLAN NOTE
-Discussed options of [facility]-Intensive Lifestyle Intervention Program, Very Low Calorie Diet-VLCD, Conservative Program, Mandy-En-Y Gastric Bypass, and Vertical Sleeve Gastrectomy and the role of weight loss medications.  -Initial weight loss goal of 5-10% weight loss for improved health  -Screening labs: reviewed CMP, Lipid panel, TSH, HgbA1c  -Patient is interested in pursuing bariatric surgery. Patient Will be scheduled with surgeon for consultation

## 2025-03-06 NOTE — ASSESSMENT & PLAN NOTE
Lab Results   Component Value Date    HGBA1C 6.1 (H) 02/14/2025   -On Metformin 500mg BID  -patient consumes a lot of refined carbohydrates. Dietary modification is paramount

## 2025-03-06 NOTE — PROGRESS NOTES
Assessment/Plan:    Morbid obesity (HCC)  -Discussed options of [facility]-Intensive Lifestyle Intervention Program, Very Low Calorie Diet-VLCD, Conservative Program, Mandy-En-Y Gastric Bypass, and Vertical Sleeve Gastrectomy and the role of weight loss medications.  -Initial weight loss goal of 5-10% weight loss for improved health  -Screening labs: reviewed CMP, Lipid panel, TSH, HgbA1c  -Patient is interested in pursuing bariatric surgery. Patient Will be scheduled with surgeon for consultation    Type 2 diabetes mellitus without complication (formerly [facility])    Lab Results   Component Value Date    HGBA1C 6.1 (H) 02/14/2025   -On Metformin 500mg BID  -patient consumes a lot of refined carbohydrates. Dietary modification is paramount     KAMLA (obstructive sleep apnea)  -recently started therapy with CPAP  -weight loss encoureaged    Hypothyroidism  -On levothyroxine  -TSH WNL    Goals:    Food log (ie.) [email or web address],etc.   No sugary beverages. At least 64oz of water daily.  Increase physical activity by 10 minutes daily. Gradually increase physical activity to a goal of 5 days per week for 30 minutes of MODERATE intensity PLUS 2 days per week of FULL BODY resistance training      Follow up in approximately 2 weeks with Bariatric Surgeon.    Diagnoses and all orders for this visit:    Morbid obesity (HCC)  -     Ambulatory Referral to Weight Management    Prediabetes  -     Ambulatory Referral to Weight Management    Type 2 diabetes mellitus without complication, without long-term current use of insulin (HCC)    BMI 50.0-59.9, adult (HCC)    KAMLA (obstructive sleep apnea)    Hypothyroidism, unspecified type          Subjective:   Chief Complaint   Patient presents with    Consult       Patient ID: Shelley Tineo  is a 40 y.o. female with excess weight/obesity here to pursue weight managment.    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[facility]) 07/17/2022         HPI:  Obesity/Excess Weight:  Severity: Very Severe  Onset:    Since adolescence - Dad over  600 lbs, Sister over 400 lbs  Modifiers: Metformin since August, Gym 2x per week - cardio   Contributing factors:  genetics , loves to eat bread/pasta because it is more affordable, always feels hungry  Associated symptoms: KAMLA, joint pain in knee, back pain, high blood pressure    Goals:    Weight History  Highest adult weight:: (Patient-Rptd) (P) 355 lbs  Lowest adult weight:: (Patient-Rptd) (P) 300 lbs  What is your goal weight?: (Patient-Rptd) (P) 200 lbs  How long have you struggled with maintaining a healthy weight?: (Patient-Rptd) (P) Childhood  What weight loss attempts have you had in the past?: (Patient-Rptd) (P) Diet, Exercise    Food Behaviors  Do you have any of the following food related behaviors?: (Patient-Rptd) (P) Cravings  Is there a trouble area of the day when these behaviors are more prominent?: (Patient-Rptd) (P) No    Food History  Provide the time that you typically eat and common foods you eat for each meal/snack: : (Patient-Rptd) (P) Breakfast, Lunch, Dinner, Snack(s)  Provide the time and common foods you eat for breakfast:: (Patient-Rptd) (P) 7am  Provide the time and common foods you eat for lunch:: (Patient-Rptd) (P) 11am  Provide the time and common foods you eat for : (Patient-Rptd) (P) 530pm  Provide the time(s) and common foods you eat for a snack:: (Patient-Rptd) (P) 3pm  How often do you go out to eat or have take out?: (Patient-Rptd) (P) 1 once a week  Daily beverage intake, please select the beverages you consume daily and the amount(s):: (Patient-Rptd) (P) Water, Soda, Tea, Coffee  How many cups of water?: (Patient-Rptd) (P) 3  How many cups of soda?: (Patient-Rptd) (P) 1  How many cups of tea?:  (Patient-Rptd) (P) 1  How many cups of coffee?: (Patient-Rptd) (P) 1  Do you consume alcohol?: (Patient-Rptd) (P) No      B: everything bagel with butter  L chicken nuggets + hashbrown  S: pretzels  D: cream of chicken over pasta OR fish or hot dogs  S: pretzels or popcorn     Hydration: 4-5 bottles of water, soda 1/2 can regular soda per day, 1 cups coffee + mocha flavoring (180 calories)  Occupation: Call rep - works from 11am-8pm    Physical Activity   How often do you exercise?: (Patient-Rptd) (P) 2 twice a week  How long are your activity sessions?: (Patient-Rptd) (P) 1-2 hrs  What types of physical activity are you currently participating in?: (Patient-Rptd) (P) Strength Training, Cardio (elliptical, walking, running, biking, rowing, etc)    Sleep  How many hours of sleep are you getting per night?: (Patient-Rptd) (P) 5  How would you rate your quality of sleep?: (Patient-Rptd) (P) Poor  Do you have a history of sleep apnea?: (Patient-Rptd) (P) Yes  Is this being treated?: (Patient-Rptd) (P) Yes Just started wearing CPAP 9-10 days agp    Family/Social History  Do you have a family history of obesity?: (Patient-Rptd) (P) Yes  Who in your family?: (Patient-Rptd) (P) Whole family    Gynecological History  If of childbearing age, are you using birth control?: (Patient-Rptd) (P) Yes  What type of birth control?: (Patient-Rptd) (P) DEAN  Menopausal status?: (Patient-Rptd) (P) N/A       Colonoscopy: completed 8/2023    The following portions of the patient's history were reviewed and updated as appropriate: allergies, current medications, past family history, past medical history, past social history, past surgical history, and problem list.    Review of Systems   Constitutional:  Negative for chills and fever.   HENT:  Negative for sore throat.    Respiratory:  Positive for cough, shortness of breath (recent bronchitis) and wheezing.    Cardiovascular:  Negative for chest pain and palpitations.   Gastrointestinal:   "Positive for diarrhea (chronic). Negative for abdominal pain, nausea and vomiting.   Genitourinary:  Negative for dysuria.   Musculoskeletal:  Positive for arthralgias.   Skin:  Negative for rash.   Neurological:  Positive for headaches. Negative for dizziness.   Psychiatric/Behavioral:  Negative for dysphoric mood. The patient is not nervous/anxious.         Irritable/loses patience easily       Objective:    /88 (BP Location: Right arm, Patient Position: Sitting, Cuff Size: Large)   Pulse 95   Temp 98.4 °F (36.9 °C) (Temporal)   Resp 20   Ht 5' 8\" (1.727 m)   Wt (!) 152 kg (335 lb)   SpO2 97%   BMI 50.94 kg/m²     Physical Exam  Vitals and nursing note reviewed.      Constitutional   General appearance: Abnormal.  well developed and morbidly obese.   Eyes No conjunctival pallor.   Ears, Nose, Mouth, and Throat Oral mucosa moist.   Pulmonary   Respiratory effort: No increased work of breathing or signs of respiratory distress.    Auscultation of lungs: Clear to auscultation, equal breath sounds bilaterally, no wheezes, no rales, no rhonci.    Cardiovascular   Auscultation of heart: Normal rate and rhythm, normal S1 and S2, without murmurs.    Examination of extremities for edema and/or varicosities: + edema bilaterally  Abdomen   Abdomen: Abnormal.  The abdomen was obese. Bowel sounds were normal. The abdomen was soft and nontender.   Musculoskeletal   Gait and station: Normal.    Psychiatric   Orientation to person, place and time: Normal.    Affect: appropriate   "

## 2025-03-07 ENCOUNTER — APPOINTMENT (OUTPATIENT)
Dept: RADIOLOGY | Facility: CLINIC | Age: 40
End: 2025-03-07
Payer: COMMERCIAL

## 2025-03-07 ENCOUNTER — CONSULT (OUTPATIENT)
Dept: PAIN MEDICINE | Facility: CLINIC | Age: 40
End: 2025-03-07
Payer: COMMERCIAL

## 2025-03-07 VITALS — WEIGHT: 293 LBS | BODY MASS INDEX: 44.41 KG/M2 | HEIGHT: 68 IN

## 2025-03-07 DIAGNOSIS — M54.42 CHRONIC BILATERAL LOW BACK PAIN WITH BILATERAL SCIATICA: Primary | ICD-10-CM

## 2025-03-07 DIAGNOSIS — G89.29 CHRONIC BILATERAL LOW BACK PAIN WITH BILATERAL SCIATICA: ICD-10-CM

## 2025-03-07 DIAGNOSIS — M54.16 LUMBAR RADICULOPATHY: ICD-10-CM

## 2025-03-07 DIAGNOSIS — M54.41 CHRONIC BILATERAL LOW BACK PAIN WITH BILATERAL SCIATICA: Primary | ICD-10-CM

## 2025-03-07 DIAGNOSIS — G89.4 CHRONIC PAIN SYNDROME: ICD-10-CM

## 2025-03-07 DIAGNOSIS — G89.29 CHRONIC BILATERAL LOW BACK PAIN WITH BILATERAL SCIATICA: Primary | ICD-10-CM

## 2025-03-07 DIAGNOSIS — M54.42 CHRONIC BILATERAL LOW BACK PAIN WITH BILATERAL SCIATICA: ICD-10-CM

## 2025-03-07 DIAGNOSIS — M54.41 CHRONIC BILATERAL LOW BACK PAIN WITH BILATERAL SCIATICA: ICD-10-CM

## 2025-03-07 PROCEDURE — 99204 OFFICE O/P NEW MOD 45 MIN: CPT | Performed by: STUDENT IN AN ORGANIZED HEALTH CARE EDUCATION/TRAINING PROGRAM

## 2025-03-07 PROCEDURE — 72110 X-RAY EXAM L-2 SPINE 4/>VWS: CPT

## 2025-03-07 NOTE — PROGRESS NOTES
"Assessment:  1. Chronic bilateral low back pain with bilateral sciatica    2. Lumbar radiculopathy    3. Chronic pain syndrome        Portions of the record may have been created with voice recognition software. Occasional wrong word or \"sound a like\" substitutions may have occurred due to the inherent limitations of voice recognition software. Read the chart carefully and recognize, using context, where substitutions have occurred. Contact me with any questions.      Plan:  40-year-old female referred by Dr. Hughes, here for initial evaluation of chronic low back pain with radiation into bilateral L5 distribution of 2 years duration.  She also notes intermittent paresthesias in the same distribution.  She notes symptoms are worse with lying down.  Denies new or progressive weakness, saddle anesthesia, BBI.  She was recently evaluated by Dr. Hughes for bilateral knee pain and lumbar DDD was deemed to be contributing to ongoing symptoms.  She has been taking OTC analgesics for symptom management with some relief.  She has not yet been to physical therapy for low back pain symptoms.  No lumbar spine imaging available for review.      Obtain lumbar spine x-ray for further evaluation.    Recommend course of physical therapy for core strengthening, LE flexibility.    Discussed trial of gabapentin for symptom management.  Patient defers due to possible adverse effect of weight gain.    Follow-up in 6 to 8 weeks or as needed. If symptoms persist or worsen, consider lumbar spine MRI for further evaluation.      History of Present Illness:    The patient is a 40 y.o. female who presents for consultation in regards to Knee Pain, Hip Pain, Leg Pain, Foot Pain, and Back Pain.  Symptoms have been present for 2 years. Symptoms began without any precipitating injury or trauma. Pain is reported to be 2 on the numeric rating scale.  Symptoms are felt nearly constantly and worst in the no typical pattern.  Symptoms are " characterized as sharp, numbing, and throbbing.  Symptoms are associated with no weakness.  Aggravating factors include lying down, standing, bending, sitting, walking, exercise, and relaxation.  Relieving factors include lying down, standing, and sitting.          Review of Systems:    Review of Systems   Constitutional:  Negative for chills, fatigue and fever.   HENT:  Negative for hearing loss, sinus pain, sore throat and trouble swallowing.    Eyes:  Negative for pain and visual disturbance.   Respiratory:  Positive for cough and wheezing. Negative for shortness of breath.    Cardiovascular:  Positive for leg swelling. Negative for chest pain and palpitations.   Gastrointestinal:  Negative for abdominal pain, constipation and nausea.   Endocrine: Negative for polydipsia and polyuria.   Genitourinary:  Negative for difficulty urinating.   Musculoskeletal:  Negative for arthralgias, gait problem, joint swelling and myalgias.   Skin:  Negative for rash.   Neurological:  Negative for dizziness, weakness and headaches.   Hematological:  Does not bruise/bleed easily.   Psychiatric/Behavioral:  Negative for dysphoric mood. The patient is not nervous/anxious.    All other systems reviewed and are negative.        Past Medical History:   Diagnosis Date    GERD (gastroesophageal reflux disease)     MRSA infection     bladder/ urine    Personal history of COVID-19 2022 - has lingering wheezing [facility]      Post-acute sequelae of COVID-19 ([facility]) 2022       Past Surgical History:   Procedure Laterality Date     SECTION      EYE SURGERY      strabismus    TOOTH EXTRACTION         Family History   Problem Relation Age of Onset    COPD Mother     Colon polyps Mother     Diabetes Father     Colon polyps Father     No Known Problems Sister     No Known Problems Sister     Gallbladder disease Sister     No Known Problems Brother     No Known Problems Brother     No Known Problems Brother     No Known  "Problems Brother     No Known Problems Daughter     No Known Problems Son     Diabetes Maternal Grandmother     Diabetes Maternal Grandfather     Diabetes Paternal Grandmother     Diabetes Paternal Grandfather        Social History     Occupational History    Not on file   Tobacco Use    Smoking status: Never    Smokeless tobacco: Never   Vaping Use    Vaping status: Never Used   Substance and Sexual Activity    Alcohol use: Not Currently     Comment: rare    Drug use: Never    Sexual activity: Yes     Partners: Male     Birth control/protection: I.U.D.         Current Outpatient Medications:     Azelastine HCl 137 MCG/SPRAY SOLN, instill 1 spray into each nostril twice a day, Disp: 30 mL, Rfl: 1    budesonide-formoterol (Symbicort) 80-4.5 MCG/ACT inhaler, Inhale 2 puffs 2 (two) times a day Rinse mouth after use., Disp: 10.2 g, Rfl: 3    famotidine (PEPCID) 20 mg tablet, take 1 tablet by mouth at bedtime, Disp: 30 tablet, Rfl: 2    hydroCHLOROthiazide 25 mg tablet, Take 1 tablet (25 mg total) by mouth daily, Disp: 30 tablet, Rfl: 2    levonorgestrel (MIRENA) 20 MCG/24HR IUD, 1 each by Intrauterine route once, Disp: , Rfl:     levothyroxine 75 mcg tablet, swallow 1 tablet once daily, Disp: 90 tablet, Rfl: 1    meloxicam (Mobic) 15 mg tablet, Take 1 tablet (15 mg total) by mouth daily, Disp: 30 tablet, Rfl: 0    metFORMIN (GLUCOPHAGE) 500 mg tablet, Take 1 tablet (500 mg total) by mouth 2 (two) times a day with meals, Disp: 60 tablet, Rfl: 5    omeprazole (PriLOSEC) 40 MG capsule, take 1 capsule by mouth once daily BEFORE LUNCH, Disp: 90 capsule, Rfl: 1    predniSONE 10 mg tablet, Take 3 tabs BID X 2 days, 2 tabs BID X 2 days, 1 tab BID X 2 days, 1 tab daily X 2 days, Disp: 26 tablet, Rfl: 0    Allergies   Allergen Reactions    Blackberry Flavor - Food Allergy Hives    Shellfish-Derived Products - Food Allergy     Tetanus Toxoid Hives and Edema       Physical Exam:    Ht 5' 8\" (1.727 m)   Wt (!) 152 kg (335 lb)   " BMI 50.94 kg/m²     Constitutional: normal, well developed, well nourished, alert, in no distress and non-toxic and no overt pain behavior.  Eyes: anicteric  HEENT: grossly intact  Neck: supple, symmetric, trachea midline and no masses   Pulmonary:even and unlabored  Cardiovascular:No edema or pitting edema present  Skin:Normal without rashes or lesions and well hydrated  Psychiatric:Mood and affect appropriate  Neurologic:Cranial Nerves II-XII grossly intact  Musculoskeletal:normal gait, limited lumbar ROM throughout, grossly intact strength and sensation to light touch over BLE      Imaging    LEFT KNEE     INDICATION:   Pain in right knee. Pain in left knee. Other chronic pain.      COMPARISON:     VIEWS:  XR KNEE 3 VW LEFT NON INJURY      FINDINGS:     There is no acute fracture or dislocation.     There is no joint effusion.     No significant degenerative changes.     No lytic or blastic osseous lesion.     Soft tissues are unremarkable.     IMPRESSION:        No acute osseous abnormality.      RIGHT KNEE     INDICATION:   Pain in right knee. Pain in left knee. Other chronic pain.        COMPARISON:  None.     VIEWS:  XR KNEE 3 VW RIGHT NON INJURY      FINDINGS:     There is no acute fracture or dislocation.     There is no joint effusion.     No significant degenerative changes noted.     No lytic or blastic osseous lesion.     Soft tissues are unremarkable.     IMPRESSION:        No acute osseous abnormality.  No fracture or significant effusion.  No significant degenerative changes.     Orders Placed This Encounter   Procedures    XR spine lumbar minimum 4 views non injury    Ambulatory referral to Physical Therapy

## 2025-03-25 ENCOUNTER — OFFICE VISIT (OUTPATIENT)
Dept: PULMONOLOGY | Facility: CLINIC | Age: 40
End: 2025-03-25
Payer: COMMERCIAL

## 2025-03-25 VITALS
RESPIRATION RATE: 18 BRPM | HEIGHT: 68 IN | WEIGHT: 293 LBS | HEART RATE: 129 BPM | SYSTOLIC BLOOD PRESSURE: 130 MMHG | DIASTOLIC BLOOD PRESSURE: 80 MMHG | BODY MASS INDEX: 44.41 KG/M2 | OXYGEN SATURATION: 98 % | TEMPERATURE: 97.8 F

## 2025-03-25 DIAGNOSIS — E66.01 MORBID OBESITY (HCC): ICD-10-CM

## 2025-03-25 DIAGNOSIS — G47.33 OSA (OBSTRUCTIVE SLEEP APNEA): Primary | ICD-10-CM

## 2025-03-25 DIAGNOSIS — R06.2 WHEEZING: ICD-10-CM

## 2025-03-25 PROCEDURE — 99214 OFFICE O/P EST MOD 30 MIN: CPT

## 2025-03-25 NOTE — ASSESSMENT & PLAN NOTE
-Improved/resolved with use of Symbicort.  Using low-dose Symbicort as needed.  Lungs are clear on exam today  -Continue Symbicort

## 2025-03-25 NOTE — PROGRESS NOTES
Follow-up  Visit - Pulmonary Medicine   Name: Shelley Tineo      : 1985      MRN: [id number]  Encounter Provider: MANOLO Huizar  Encounter Date: 3/25/2025   Encounter department: [facility] [place]  :  Assessment & Plan  KAMLA (obstructive sleep apnea)  -Reviewed compliance data over the past 30 days.  Showed average nightly use of 7 hours and 18 minutes.  Using greater than 4 hours 90% of the time.  Residual AHI 1.04.  Average mask leakage 13.3 7L/min.  -Patient struggling with mask and headgear discomfort.  Currently has nasal pillow mask, wants to try something different.  Will send for mask fitting appointment  -Has not noted any improvement of daytime sleepiness yet.  -She will continue wearing CPAP nightly.  Evaluate compliance data and symptoms at next office visit    Orders:    Mask fitting only; Future    Wheezing  -Improved/resolved with use of Symbicort.  Using low-dose Symbicort as needed.  Lungs are clear on exam today  -Continue Symbicort       Morbid obesity (HCC)  -Has consultation with bariatrics later this week  -Encouraged weight loss efforts         No follow-ups on file.    History of Present Illness   Shelley Tineo is a 40 y.o. female who is here today for a follow-up visit.  She was previously seen in the office in December with Dr. Foley for evaluation of wheezing.  She had prior PFTs in  which were normal.  She was trialed on low-dose Symbicort for possible asthma.  She was also noted to have excessive daytime sleepiness and snoring.  She was sent for home sleep study which showed mild KAMLA and was ordered CPAP.  She is here today to review compliance.     Compliance report reviewed.  She is wearing on average 7 hours and 18 minutes nightly with residual AHI 1.04.  Patient has not noticed any improvement of daytime sleepiness yet.  She is struggling with nasal pillow mask and headgear being too uncomfortable.  She thinks the headgear is contributing  "to her migraines.  She would like to try a different style mask/headgear.  As far as her wheezing, states this has significantly improved since she started on the Symbicort inhaler.  Using as needed, last use 1 and half weeks ago.  She otherwise denies any pulmonary symptoms.      Review of Systems   Constitutional:  Negative for appetite change and fever.   HENT:  Negative for ear pain, postnasal drip, rhinorrhea, sneezing, sore throat and trouble swallowing.    Cardiovascular:  Negative for chest pain.   Musculoskeletal:  Negative for myalgias.   Neurological:  Positive for headaches.       Aside from what is mentioned in the HPI, ROS is otherwise negative         Medical History Reviewed by provider this encounter:     .    Objective   There were no vitals taken for this visit.    Physical Exam  Vitals and nursing note reviewed.   Constitutional:       General: She is not in acute distress.     Appearance: Normal appearance. She is well-developed. She is obese.   Cardiovascular:      Rate and Rhythm: Normal rate and regular rhythm.      Heart sounds: Normal heart sounds, S1 normal and S2 normal. No murmur heard.  Pulmonary:      Effort: Pulmonary effort is normal.      Breath sounds: Normal breath sounds. No decreased breath sounds, wheezing, rhonchi or rales.   Musculoskeletal:         General: No swelling.      Right lower leg: No edema.      Left lower leg: No edema.   Neurological:      Mental Status: She is alert.   Psychiatric:         Mood and Affect: Mood and affect normal.         Behavior: Behavior normal. Behavior is cooperative.           Diagnostic Data:  Labs: I personally reviewed the most recent laboratory data pertinent to today's visit.      Radiology results:  Radiology Results Review : No pertinent imaging studies reviewed.      PFT/spirometry results: Reviewed  No results found for: \"FEV1\", \"FVC\", \"PRU6TOP\", \"TLC\", \"DLCO\"       Oximetry testing:      Other studies:      Renee Hernandez, " CRNP

## 2025-03-25 NOTE — ASSESSMENT & PLAN NOTE
-Reviewed compliance data over the past 30 days.  Showed average nightly use of 7 hours and 18 minutes.  Using greater than 4 hours 90% of the time.  Residual AHI 1.04.  Average mask leakage 13.3 7L/min.  -Patient struggling with mask and headgear discomfort.  Currently has nasal pillow mask, wants to try something different.  Will send for mask fitting appointment  -Has not noted any improvement of daytime sleepiness yet.  -She will continue wearing CPAP nightly.  Evaluate compliance data and symptoms at next office visit    Orders:    Mask fitting only; Future

## 2025-03-28 ENCOUNTER — CONSULT (OUTPATIENT)
Dept: BARIATRICS | Facility: CLINIC | Age: 40
End: 2025-03-28
Payer: COMMERCIAL

## 2025-03-28 ENCOUNTER — OFFICE VISIT (OUTPATIENT)
Age: 40
End: 2025-03-28
Payer: COMMERCIAL

## 2025-03-28 VITALS
WEIGHT: 293 LBS | BODY MASS INDEX: 44.41 KG/M2 | TEMPERATURE: 98.7 F | SYSTOLIC BLOOD PRESSURE: 126 MMHG | HEART RATE: 111 BPM | HEIGHT: 68 IN | OXYGEN SATURATION: 97 % | DIASTOLIC BLOOD PRESSURE: 96 MMHG

## 2025-03-28 VITALS
OXYGEN SATURATION: 99 % | TEMPERATURE: 98.2 F | HEIGHT: 68 IN | DIASTOLIC BLOOD PRESSURE: 100 MMHG | WEIGHT: 293 LBS | SYSTOLIC BLOOD PRESSURE: 172 MMHG | HEART RATE: 128 BPM | RESPIRATION RATE: 17 BRPM | BODY MASS INDEX: 44.41 KG/M2

## 2025-03-28 DIAGNOSIS — K58.0 IRRITABLE BOWEL SYNDROME WITH DIARRHEA: ICD-10-CM

## 2025-03-28 DIAGNOSIS — E66.813 CLASS 3 SEVERE OBESITY DUE TO EXCESS CALORIES WITH SERIOUS COMORBIDITY AND BODY MASS INDEX (BMI) OF 50.0 TO 59.9 IN ADULT (HCC): Primary | ICD-10-CM

## 2025-03-28 DIAGNOSIS — E66.813 CLASS 3 SEVERE OBESITY DUE TO EXCESS CALORIES WITH SERIOUS COMORBIDITY AND BODY MASS INDEX (BMI) OF 50.0 TO 59.9 IN ADULT (HCC): ICD-10-CM

## 2025-03-28 DIAGNOSIS — M25.561 CHRONIC PAIN OF BOTH KNEES: ICD-10-CM

## 2025-03-28 DIAGNOSIS — E66.01 CLASS 3 SEVERE OBESITY DUE TO EXCESS CALORIES WITH SERIOUS COMORBIDITY AND BODY MASS INDEX (BMI) OF 50.0 TO 59.9 IN ADULT (HCC): Primary | ICD-10-CM

## 2025-03-28 DIAGNOSIS — I10 ESSENTIAL HYPERTENSION: ICD-10-CM

## 2025-03-28 DIAGNOSIS — E66.01 CLASS 3 SEVERE OBESITY DUE TO EXCESS CALORIES WITH SERIOUS COMORBIDITY AND BODY MASS INDEX (BMI) OF 50.0 TO 59.9 IN ADULT (HCC): ICD-10-CM

## 2025-03-28 DIAGNOSIS — G47.33 OSA (OBSTRUCTIVE SLEEP APNEA): ICD-10-CM

## 2025-03-28 DIAGNOSIS — M25.562 CHRONIC PAIN OF BOTH KNEES: ICD-10-CM

## 2025-03-28 DIAGNOSIS — G89.29 CHRONIC PAIN OF BOTH KNEES: ICD-10-CM

## 2025-03-28 DIAGNOSIS — E11.9 TYPE 2 DIABETES MELLITUS WITHOUT COMPLICATION, WITHOUT LONG-TERM CURRENT USE OF INSULIN (HCC): ICD-10-CM

## 2025-03-28 DIAGNOSIS — R19.5 ELEVATED FECAL CALPROTECTIN: ICD-10-CM

## 2025-03-28 DIAGNOSIS — K21.9 GASTROESOPHAGEAL REFLUX DISEASE WITHOUT ESOPHAGITIS: Primary | ICD-10-CM

## 2025-03-28 PROCEDURE — 99204 OFFICE O/P NEW MOD 45 MIN: CPT | Performed by: SURGERY

## 2025-03-28 PROCEDURE — 99214 OFFICE O/P EST MOD 30 MIN: CPT | Performed by: STUDENT IN AN ORGANIZED HEALTH CARE EDUCATION/TRAINING PROGRAM

## 2025-03-28 RX ORDER — FAMOTIDINE 20 MG/1
20 TABLET, FILM COATED ORAL
Qty: 30 TABLET | Refills: 5 | Status: SHIPPED | OUTPATIENT
Start: 2025-03-28

## 2025-03-28 NOTE — ASSESSMENT & PLAN NOTE
Etiology unclear.  She did have bidirectional endoscopy with biopsies which were negative.  May have been a transient elevation possibly related to infectious etiology.    We will recheck fecal calprotectin    Orders:    Calprotectin,Fecal; Future

## 2025-03-28 NOTE — ASSESSMENT & PLAN NOTE
Symptoms currently stable.  May have occasional breakthrough depending on dietary habits.  EGD report from 2023 reviewed.    Continue omeprazole 40 mg daily  Continue famotidine 20 mg daily at bedtime  Encouraged weight loss which would also help with reflux symptoms    Orders:    famotidine (PEPCID) 20 mg tablet; Take 1 tablet (20 mg total) by mouth daily at bedtime

## 2025-03-28 NOTE — ASSESSMENT & PLAN NOTE
Symptoms are overall improved.  She could also have diarrhea secondary to medications that she is on metformin.  Lower suspicion for SIBO.  Low suspicion for IBD.  She did have elevated fecal calprotectin in the past.  Segmental colon biopsies negative.    Follow clinically  Consider trial of rifaximin in the future if symptoms worsen

## 2025-03-28 NOTE — ASSESSMENT & PLAN NOTE
Currently on metformin.  I have discussed with the patient that there is a significant chance of improvement or even resolution of this condition after metabolic/bariatric surgery.  Continue management as per primary care team.    Lab Results   Component Value Date    HGBA1C 6.1 (H) 02/14/2025

## 2025-03-28 NOTE — ASSESSMENT & PLAN NOTE
40 y.o. female with a long standing h/o of obesity and inability to sustain any meaningful weight loss on her own despite several attempts.    She is interested in the Laparoscopic gastric bypass possible sleeve gastrectomy.  I have discussed both options with her today.    As a part of her pre op evaluation, she will be referred to a cardiologist for risk stratification and for a sleep evaluation and consult to rule out obstructive sleep apnea if deemed necessary based on her score.    She needs an EGD to evaluate the anatomy of her GI tract prior to the operation.  I have spent over 30 minutes with her face to face in the office today discussing her options and details of the surgery. We have seen an animation of the surgery on the computer that illustrates how the operation is done and how the anatomy will be altered with the procedure. Over 50% of this was coordinating care.    I have used the MBSAQIP bariatric surgical risk/benefit calculator and we have discussed the results as part of the preop education.  She was given the opportunity to ask questions and I have answered all of them.  I have discussed and educated the patient with regards to the components of our multidisciplinary program and the importance of compliance and follow up in the post operative period. The patient was also instructed with regards to the importance of behavior modification, nutritional counseling, support meeting attendance and lifestyle changes that are important to ensure success.     Although there is a great statistical chance of improvement or even resolution of most of her associated comorbidities, the results vary from patient to patient and they largely depend on her commitment and compliance.     I have reviewed instructions for stopping or tapering anti-obesity medications prior to surgery.      I have encouraged her to lose 15-20 lbs prior to the operation.

## 2025-03-28 NOTE — PROGRESS NOTES
Name: Shelley Tineo      : 1985      MRN: [id number]  Encounter Provider: Dejon Jha DO  Encounter Date: 3/28/2025   Encounter department: [facility]  :  Assessment & Plan  Gastroesophageal reflux disease without esophagitis  Symptoms currently stable.  May have occasional breakthrough depending on dietary habits.  EGD report from  reviewed.    Continue omeprazole 40 mg daily  Continue famotidine 20 mg daily at bedtime  Encouraged weight loss which would also help with reflux symptoms    Orders:    famotidine (PEPCID) 20 mg tablet; Take 1 tablet (20 mg total) by mouth daily at bedtime    Irritable bowel syndrome with diarrhea  Symptoms are overall improved.  She could also have diarrhea secondary to medications that she is on metformin.  Lower suspicion for SIBO.  Low suspicion for IBD.  She did have elevated fecal calprotectin in the past.  Segmental colon biopsies negative.    Follow clinically  Consider trial of rifaximin in the future if symptoms worsen         Elevated fecal calprotectin  Etiology unclear.  She did have bidirectional endoscopy with biopsies which were negative.  May have been a transient elevation possibly related to infectious etiology.    We will recheck fecal calprotectin    Orders:    Calprotectin,Fecal; Future    Class 3 severe obesity due to excess calories with serious comorbidity and body mass index (BMI) of 50.0 to 59.9 in adult (HCC)  BMI 51.54 kg/m².  Comorbidities include sleep apnea, hypertension, diabetes.    She has appointment with weight management this afternoon  Encouraged weight loss efforts  Monitor weight trends             History of Present Illness   Shelley Tineo is a 40 y.o. female who presents to GI office for follow-up.  Previously seen for complaints of abdominal pain, diarrhea, reflux.  She has been doing relatively well since her last office visit.  She does have some mild abdominal discomfort at times in the  lower abdomen.  This is chronic in nature, intermittent and may be related to underlying IBS.  She has been trying to eat healthier and has been using an air fryer which has helped with some of her symptoms.  Her reflux symptoms appear to be controlled although may have occasional breakthrough depending on her dietary habits.  She states that she has an appointment with weight management this afternoon.  She is looking forward to discussing aids for weight loss.  She is considering bariatric surgery.  Denies any hematochezia or melena.  No other acute GI complaints.    Abdominal Pain  This is a chronic problem. The current episode started more than 1 year ago. The onset quality is undetermined. The problem occurs daily. The most recent episode lasted 1 hours. The problem has been unchanged. The pain is located in the LLQ and LUQ. The pain is at a severity of 1/10. The quality of the pain is burning and sharp. The abdominal pain does not radiate. Associated symptoms include arthralgias, diarrhea, headaches and myalgias. Pertinent negatives include no anorexia, belching, constipation, dysuria, fever, flatus, frequency, hematochezia, hematuria, melena, nausea, vomiting or weight loss. The pain is aggravated by bowel movement and eating. The pain is relieved by Bowel movements. Prior diagnostic workup includes lower endoscopy and upper endoscopy.     History obtained from: patient  Review of Systems   Constitutional:  Negative for appetite change, fever and weight loss.   HENT:  Negative for trouble swallowing.    Respiratory:  Negative for shortness of breath.    Cardiovascular:  Negative for chest pain and palpitations.   Gastrointestinal:  Positive for abdominal pain and diarrhea. Negative for abdominal distention, anorexia, blood in stool, constipation, flatus, hematochezia, melena, nausea, rectal pain and vomiting.   Genitourinary:  Negative for dysuria, frequency and hematuria.   Musculoskeletal:  Positive for  arthralgias and myalgias.   Neurological:  Positive for headaches.   All other systems reviewed and are negative.   A complete review of systems is negative other than that noted above in the HPI.    Medical History Reviewed by provider this encounter:     .  Current Outpatient Medications on File Prior to Visit   Medication Sig Dispense Refill    Azelastine HCl 137 MCG/SPRAY SOLN instill 1 spray into each nostril twice a day 30 mL 1    budesonide-formoterol (Symbicort) 80-4.5 MCG/ACT inhaler Inhale 2 puffs 2 (two) times a day Rinse mouth after use. 10.2 g 3    hydroCHLOROthiazide 25 mg tablet Take 1 tablet (25 mg total) by mouth daily 30 tablet 2    levonorgestrel (MIRENA) 20 MCG/24HR IUD 1 each by Intrauterine route once      levothyroxine 75 mcg tablet swallow 1 tablet once daily 90 tablet 1    metFORMIN (GLUCOPHAGE) 500 mg tablet Take 1 tablet (500 mg total) by mouth 2 (two) times a day with meals 60 tablet 5    omeprazole (PriLOSEC) 40 MG capsule take 1 capsule by mouth once daily BEFORE LUNCH 90 capsule 1    [DISCONTINUED] famotidine (PEPCID) 20 mg tablet take 1 tablet by mouth at bedtime 30 tablet 2    meloxicam (Mobic) 15 mg tablet Take 1 tablet (15 mg total) by mouth daily (Patient not taking: Reported on 3/28/2025) 30 tablet 0     No current facility-administered medications on file prior to visit.      Social History     Tobacco Use    Smoking status: Never    Smokeless tobacco: Never   Vaping Use    Vaping status: Never Used   Substance and Sexual Activity    Alcohol use: Not Currently     Comment: rare    Drug use: Never    Sexual activity: Yes     Partners: Male     Birth control/protection: I.U.D.     Current Outpatient Medications   Medication Sig Dispense Refill    Azelastine HCl 137 MCG/SPRAY SOLN instill 1 spray into each nostril twice a day 30 mL 1    budesonide-formoterol (Symbicort) 80-4.5 MCG/ACT inhaler Inhale 2 puffs 2 (two) times a day Rinse mouth after use. 10.2 g 3    famotidine (PEPCID)  "20 mg tablet Take 1 tablet (20 mg total) by mouth daily at bedtime 30 tablet 5    hydroCHLOROthiazide 25 mg tablet Take 1 tablet (25 mg total) by mouth daily 30 tablet 2    levonorgestrel (MIRENA) 20 MCG/24HR IUD 1 each by Intrauterine route once      levothyroxine 75 mcg tablet swallow 1 tablet once daily 90 tablet 1    metFORMIN (GLUCOPHAGE) 500 mg tablet Take 1 tablet (500 mg total) by mouth 2 (two) times a day with meals 60 tablet 5    omeprazole (PriLOSEC) 40 MG capsule take 1 capsule by mouth once daily BEFORE LUNCH 90 capsule 1    meloxicam (Mobic) 15 mg tablet Take 1 tablet (15 mg total) by mouth daily (Patient not taking: Reported on 3/28/2025) 30 tablet 0     No current facility-administered medications for this visit.     Objective   BP (!) 172/100 (BP Location: Right arm, Patient Position: Sitting, Cuff Size: Adult)   Pulse (!) 128   Temp 98.2 °F (36.8 °C) (Temporal)   Resp 17   Ht 5' 8\" (1.727 m)   Wt (!) 154 kg (339 lb)   SpO2 99%   BMI 51.54 kg/m²     Physical Exam  Vitals reviewed.   Constitutional:       Appearance: Normal appearance.   HENT:      Head: Normocephalic and atraumatic.      Nose: Nose normal.   Eyes:      Extraocular Movements: Extraocular movements intact.   Cardiovascular:      Rate and Rhythm: Regular rhythm. Tachycardia present.   Pulmonary:      Effort: Pulmonary effort is normal. No respiratory distress.   Abdominal:      General: Abdomen is flat. Bowel sounds are normal. There is no distension.      Palpations: Abdomen is soft. There is no mass.      Tenderness: There is no abdominal tenderness. There is no guarding.   Neurological:      General: No focal deficit present.      Mental Status: She is alert.   Psychiatric:         Mood and Affect: Mood normal.         Behavior: Behavior normal.        Lab Results: I personally reviewed relevant lab results. CBC/BMP: No new results in last 24 hours. , Creatinine Clearance: CrCl cannot be calculated (Patient's most recent lab " result is older than the maximum 7 days allowed.)., LFTs: No new results in last 24 hours.     Radiology Results Review: I have reviewed radiology reports from 2023 including: procedure reports.  Results for orders placed during the hospital encounter of 08/18/23    EGD    Impression  5 subcentimeter fundic gland polyps in the body of the stomach were removed with cold forceps biopsy  The upper third of the esophagus, middle third of the esophagus and lower third of the esophagus appeared normal.  The cardia, fundus of the stomach, incisura, antrum and pylorus appeared normal. Performed 5 random biopsies to rule out H. pylori.  The duodenal bulb, 1st part of the duodenum and 2nd part of the duodenum appeared normal. Performed random biopsy to rule out celiac disease.      RECOMMENDATION:  Await pathology results  Continue PPI  Proceed with colonoscopy          Dejon Jha, DO      Administrative Statements   I have spent a total time of 20 minutes in caring for this patient on the day of the visit/encounter including Diagnostic results, Prognosis, Risks and benefits of tx options, Instructions for management, Patient and family education, Importance of tx compliance, Risk factor reductions, Impressions, Documenting in the medical record, Reviewing/placing orders in the medical record (including tests, medications, and/or procedures), and Obtaining or reviewing history  .

## 2025-03-28 NOTE — ASSESSMENT & PLAN NOTE
Currently on hydrochlorothiazide.  I have discussed with the patient that there is a significant chance of improvement or even resolution of this condition after metabolic/bariatric surgery.  Continue management as per primary care team.

## 2025-03-28 NOTE — ASSESSMENT & PLAN NOTE
Currently taking Mobic  I have discussed with the patient that there is a significant chance of improvement or even resolution of this condition after metabolic/bariatric surgery.  Continue management as per primary care team.

## 2025-03-28 NOTE — ASSESSMENT & PLAN NOTE
Patient has KAMLA and wears a CPAP machine.  I have discussed with the patient that there is a significant chance of improvement or even resolution of this condition after metabolic/bariatric surgery.  Continue management as per primary care team.

## 2025-03-28 NOTE — PROGRESS NOTES
BARIATRIC INITIAL CONSULT - BARIATRIC SURGERY    Shelley Tineo 40 y.o. female MRN: [id number]  Unit/Bed#:  Encounter: [id number]      HPI:  Shelley Tineo is a 40 y.o. female who presents with a longstanding history of morbid obesity and inability to sustain a meaningful weight loss.    Here today to discuss bariatric options.    Visit type: initial visit    Symptoms: excess weight and inability to loss weight    Associated Symptoms: depressed mood and anxiety    Associated Conditions: sleep apnea, abdominal obesity, and hypergycemia  Disease Complications: diabetes, hypertension, sleep apnea, and osteoarthritis  Weight Loss Interest: high  Previous Diet Trials: low calorie     Exercise Frequency:infrequency  Types of Exercise: walking        Review of Systems   All other systems reviewed and are negative.      Historical Information   Past Medical History:   Diagnosis Date    Disease of thyroid gland     GERD (gastroesophageal reflux disease)     Hypertension     MRSA infection     bladder/ urine    Nasal congestion     KAMLA on CPAP     Personal history of COVID-19 2022 - has lingering wheezing PASC      Post-acute sequelae of COVID-19 (PASC) 2022     Past Surgical History:   Procedure Laterality Date     SECTION      EYE SURGERY      strabismus    TOOTH EXTRACTION       Social History   Social History     Substance and Sexual Activity   Alcohol Use Not Currently    Comment: rare     Social History     Substance and Sexual Activity   Drug Use Never     Social History     Tobacco Use   Smoking Status Never   Smokeless Tobacco Never     Family History: Family history non-contributory    Meds/Allergies   all medications and allergies reviewed  Allergies   Allergen Reactions    Blackberry Flavor - Food Allergy Hives    Shellfish-Derived Products - Food Allergy     Tetanus Toxoid Hives and Edema       Objective       Current Vitals:   Blood Pressure: 126/96 (25 1302)  Pulse: (!)  "111 (03/28/25 1302)  Temperature: 98.7 °F (37.1 °C) (03/28/25 1302)  Temp Source: Tympanic (03/28/25 1302)  Height: 5' 8\" (172.7 cm) (03/28/25 1302)  Weight - Scale: (!) 152 kg (336 lb) (03/28/25 1302)  SpO2: 97 % (03/28/25 1302)        Invasive Devices       None                   Physical Exam  Vitals reviewed.   Constitutional:       General: She is not in acute distress.     Appearance: She is well-developed. She is not diaphoretic.   HENT:      Head: Normocephalic and atraumatic.      Right Ear: External ear normal.      Left Ear: External ear normal.      Nose: Nose normal.   Eyes:      General: No scleral icterus.        Right eye: No discharge.         Left eye: No discharge.      Conjunctiva/sclera: Conjunctivae normal.   Neurological:      Mental Status: She is alert and oriented to person, place, and time.   Psychiatric:         Behavior: Behavior normal.         Thought Content: Thought content normal.         Judgment: Judgment normal.         Lab Results: I have personally reviewed pertinent lab results.    Imaging: Results Review Statement: No pertinent imaging studies reviewed.  EKG, Pathology, and Other Studies: Results Review Statement: No pertinent imaging studies reviewed.      Assessment/PLAN:    Essential hypertension  Currently on hydrochlorothiazide.  I have discussed with the patient that there is a significant chance of improvement or even resolution of this condition after metabolic/bariatric surgery.  Continue management as per primary care team.      KAMLA (obstructive sleep apnea)  Patient has KAMLA and wears a CPAP machine.  I have discussed with the patient that there is a significant chance of improvement or even resolution of this condition after metabolic/bariatric surgery.  Continue management as per primary care team.      Type 2 diabetes mellitus without complication (HCC)  Currently on metformin.  I have discussed with the patient that there is a significant chance of improvement " or even resolution of this condition after metabolic/bariatric surgery.  Continue management as per primary care team.    Lab Results   Component Value Date    HGBA1C 6.1 (H) 02/14/2025       Chronic pain of both knees  Currently taking Mobic  I have discussed with the patient that there is a significant chance of improvement or even resolution of this condition after metabolic/bariatric surgery.  Continue management as per primary care team.      Class 3 severe obesity due to excess calories with serious comorbidity and body mass index (BMI) of 50.0 to 59.9 in adult (HCC)  40 y.o. female with a long standing h/o of obesity and inability to sustain any meaningful weight loss on her own despite several attempts.    She is interested in the Laparoscopic gastric bypass possible sleeve gastrectomy.  I have discussed both options with her today.    As a part of her pre op evaluation, she will be referred to a cardiologist for risk stratification and for a sleep evaluation and consult to rule out obstructive sleep apnea if deemed necessary based on her score.    She had an endoscopy in 2023 that revealed some gastritis and the biopsies were negative for H. pylori.  Polyp was removed and it was a benign fundic.    I have spent over 30 minutes with her face to face in the office today discussing her options and details of the surgery. We have seen an animation of the surgery on the computer that illustrates how the operation is done and how the anatomy will be altered with the procedure. Over 50% of this was coordinating care.    I have used the MBSAQIP bariatric surgical risk/benefit calculator and we have discussed the results as part of the preop education.  She was given the opportunity to ask questions and I have answered all of them.  I have discussed and educated the patient with regards to the components of our multidisciplinary program and the importance of compliance and follow up in the post operative period. The  patient was also instructed with regards to the importance of behavior modification, nutritional counseling, support meeting attendance and lifestyle changes that are important to ensure success.     Although there is a great statistical chance of improvement or even resolution of most of her associated comorbidities, the results vary from patient to patient and they largely depend on her commitment and compliance.     I have reviewed instructions for stopping or tapering anti-obesity medications prior to surgery.      I have encouraged her to lose 15-20 lbs prior to the operation.        Luis Moreno MD  3/28/2025  1:21 PM

## 2025-03-28 NOTE — ASSESSMENT & PLAN NOTE
BMI 51.54 kg/m².  Comorbidities include sleep apnea, hypertension, diabetes.    She has appointment with weight management this afternoon  Encouraged weight loss efforts  Monitor weight trends

## 2025-03-28 NOTE — PROGRESS NOTES
"- Height/Weight:  68.0\"  /  336.0 lbs.  - BMI:  51.1  - Medical conditions related to obesity: HTN (1 med), KAMLA w/ CPAP, GERD  - Does the patient smoke/vape (nicotine, marijuana, hookah, etc): no  - StopBANG: Diagnosed KAMLA w/ CPAP   - Does the patient currently take a weight loss medication: yes - Metformin    "

## 2025-04-07 ENCOUNTER — TELEPHONE (OUTPATIENT)
Dept: BARIATRICS | Facility: CLINIC | Age: 40
End: 2025-04-07

## 2025-04-12 DIAGNOSIS — J34.89 NASAL DRAINAGE: ICD-10-CM

## 2025-04-12 DIAGNOSIS — R05.3 CHRONIC COUGH: ICD-10-CM

## 2025-04-14 RX ORDER — AZELASTINE HYDROCHLORIDE 137 UG/1
1 SPRAY, METERED NASAL 2 TIMES DAILY
Qty: 30 ML | Refills: 1 | Status: SHIPPED | OUTPATIENT
Start: 2025-04-14

## 2025-04-15 ENCOUNTER — CLINICAL SUPPORT (OUTPATIENT)
Dept: BARIATRICS | Facility: CLINIC | Age: 40
End: 2025-04-15

## 2025-04-15 DIAGNOSIS — E66.01 MORBID OBESITY (HCC): Primary | ICD-10-CM

## 2025-04-15 PROCEDURE — RECHECK

## 2025-04-15 NOTE — PROGRESS NOTES
Spoke to patient on the phone:    Patient is interested in the bariatric surgical process. Patient qualifies for surgery with current comorbidities and BMI. Discussed the entire surgical workup process and all requirements of [facility] program and patient's insurance. Answered all questions at the time of the phone call. All SW/RD questions redirected to the next appointment, the 2-hour evaluation.    Patient has been informed to contact insurance to verify there is Bariatric surgery  coverage written on the policy prior next appointment. Also to discuss of any out of pocket expense if insurance does not cover at 100%      Patient verbalized understanding of the surgical process at [facility] and had no further questions at this time.

## 2025-05-07 NOTE — PROGRESS NOTES
Bariatric Behavioral Health Evaluation    Pre Op weight check Surgeon: Dr. Luis Moreno-encouraged her to lose 15-20 lbs prior to the operation   RNY    Starting weight 336.0 # (discussed need to be (at minimum) at or below starting weight at time case is submitted to insurance)  Today's weight 312.2 #    Surgery month:  SEPT-OCT (aiming for )    Workflow:  Psych and/or D+A additional documentation: n/a  PCP Letter: 2025  Support Group: RECOMMENDED  Surgeon Appt.: 3/28/2025  EGD : 2023  Cardiac Risk Assessment: needs-2025  Sleep Studies: KAMLA w/ CPAP-compliant with machine at least 4hrs nightly  Bloodwork: ordered     Next weight check scheduled with SW.     Goals:   Practice self care-10 minutes day, find coping skills you enjoy  2.  Continue mindful eating, track food intake as able   3.  Walking every night with s/o-3-4 blocks  4. Continue following the bariatric recommendations to prepare for bariatric surgery      Presenting Problem:   .Shelley Tineo  is a 40 y.o.   female    :  1985   Patient presented with overall concerns of obesity.  Stated that weight has impacted quality of life and has current future concerns with lack of mobility, movement, chronic pain, and overall health.  Has attempted various weight loss plans in the past including dieting    Patient is Interested in exploring bariatric surgery.as an option for  weight loss goals.      Is the patient seeking Bariatric Surgery?   YES  Sister is post bariatric surgery with success. Personal goal weight 180-200#. Has been seriously considering bariatric surgery. Has been overweight since childhood.  Struggles to keep up with family.     Current Barriers to Change: No     Realizes Post- Op Requirements? Yes, and will learn more meeting with the dietitians and social workers through the process     Pre-morbid level of function and history of present illness: diabetes, hypertension, GERD-used to throw up food before  "starting 2-RX (regurgitation), IBS, sleep apnea, knee pain and osteoarthritis.      Stressors and Supports: family is very supportive. Weight and work are main stressors     Living situation:  & 17, 12, 11yo. Shelley does all food shopping & cooking for family home. Family is on board with lifestyle changes.     Eating Habits: cut out bread, sugar, soda, carbs cold turkey. As of 2/14/25-Drinks a can of mountain dew every day in addition to coffee-stopped this completely. Meals-chicken, boiled vegetables. Describes relationship with food as \"best friends\", leans on food for comfort when working, bored, stressed. Hydrates with at least 6 bottles of water. Adrien coffee on days off-SF blueberry with one pump of butter pecan and cream. Reading labels more, mindful of portion sizes, has been tracking food (1500-2k calories) [employer] flower. Eats slow, chews well. Take out on Fridays. Keeps healthy snacks in home. No longer keeps food at desk. Hates peanut butter. Triggers-bread, ice cream.   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     Physical Activity: family walks, gardening, pool maintenance. Has knee pain.      Family History (medical, traditions, culture, rules/routines around food): both sides of family are overweight.  is overweight but is able to be active. Children are overweight.      Mental Health, Trauma and Substance use Assessment    Psychiatric/Psychological Treatment Diagnosis:   denies all mental health diagnosed. Endorses some anger with work situations but tolerates the negative calls. Coping skills- eating was her coping skills; enjoys tv shows, uses phone, [employer] games with s/o    History of Eating Disorder:  No     Outpatient Counselor " No      Psychiatrist No     Have you had any Mental Health Higher level of care (ED, PHP or Inpatient ) Treatment? No    Drug and/or Alcohol use and treatment history: No    Have you had any Substance Use Treatment? No     Tobacco/Vaping History: No (-second hand smoke since 3/29/25)  Patient agrees to remain nicotine free post surgery.    Domestic Violence: No     Abuse or Trauma History: No    Mental health and wellness -  Patient is working on modifying behaviors and demonstrating adapting to change.    Risk Assessment    Identified support system intact.     Risk of harm to self or others:  none noted during evaluation  No HI/SI      Presence of Audio/Visual Hallucinations: Not reported during evaluation     Access to weapons: Not reported during evaluation     Observation: this interview only (SW and RD will follow Shelley Tineo through the bariatric program)     Based on the previous information, the client presents the following risk of harm to self or others:  Low risk        Physical/Mental Health Status:              Appearance: appropriate           Sociability: average           Affect: appropriate           Mood: calm           Thought Process: coherent           Speech: normal           Content: no impairment           Orientation: person  Yes , place  Yes , time  Yes , normal attention span  Yes , normal memory  Yes   and normal judgement  Yes            Insight: emotional  good       BARIATRIC SURGERY EDUCATION CHECKLIST    Patient has received the following education related to the bariatric surgery process and understands:    Patients may be required to complete a psychiatric evaluation and receive clearance for surgery from mental health provider.    Patients who undergo weight loss surgery are at higher risk of increased mental health concerns and suicide attempts.    Patients may be required to complete a full substance abuse evaluation and then complete all treatment recommendations  prior to surgery.    If diagnosis of abuse/dependence results, patient may be required to remain sober for one (1) year before having bariatric surgery.    Patients on psychiatric medications should check with their provider to discuss psychiatric medications and the changes in absorption.  Patient should discuss all time release medications with provider and take all medications as prescribed.    The recommendation is that there is no use of any tobacco products, Hookah or vapes for the bariatric post-operation patient.    Bariatric surgery patients should not consume alcohol as a post-operative patient as it may increase risk of numerous health conditions including but not limited to alcohol abuse and ulcers.    There is a possibility of weight regain if patient does not follow all program guidelines and recommendations.    Bariatric surgery patients should exercise thirty (30) to sixty (60) minutes per day to maintain post-surgical weight loss.    Research indicates that bariatric patients are more successful when they see a therapist for up to two (2) years post-op.    Patients will follow all medical and dietary recommendations provided.    Patient will keep all scheduled appointments and follow up with their physician for a minimum of five (5) years.    Patient will take all vitamins as recommended.  Post-operative vitamins are life-long.    There is a goal month set.  All requirements should be met by this time. Don't wait to get started!    There is a deadline month set.  All requirements must be finished by this time and if not, the patient will be halted in the surgery process. The patient can be referred to the medical weight management program or can come back to the surgical program once the unfinished tasks from the previous program are completed.      Female patients of childbearing years are informed that pregnancy is not recommended until 12 - 18 months post-op.      Recommendations: Recommended for  surgery  yes    Social Service Note:  Patient presented for behavioral health evaluation for the bariatric program.   Denies all current and history of Mental Health.    Denies history of talk therapy.   Denies history of Psychiatry.   Denies history of Drug and/or Alcohol abuse or treatment.  No tobacco use.  Patient educated regarding the impact of nicotine and alcohol on the post surgery bariatric patient. Patient has a negative family history of tobacco and alcohol addiction.     Patient has no reported contraindications for bariatric surgery.  Patient will begin making changes with relationship with food through behavior modifications and mindful techniques.  Tracking food intake for one week every three months can assist with weight maintenance and self accountability for post surgery success.   and Dietitian follow up visits are available for pre and post surgery support.  Bariatric support group is available monthly.   Patient verbalized the ability to start making changes and create a healthy relationship around nutrition.     Patient meets criteria for surgery at this time and is referred back to the bariatric surgeon.        SHAINA Thakkar

## 2025-05-09 NOTE — PROGRESS NOTES
"Bariatric Nutrition Assessment Note    Type of surgery    Preop- no months required  Surgery Date: tentative September-October 2025  Surgeon: oHllis Surgeons: Dr. Moreno   She is interested in the Laparoscopic gastric bypass    Nutrition Assessment   Shelley Tineo  40 y.o.  female   3/28/2025 surgeon consult: 336lbs: \"I have encouraged her to lose 15-20 lbs prior to the operation.\"  Wt with BMI of 25: 164.44lbs  Pre-Op Excess Wt: 171.56lbs  Wt (!) 142 kg (312 lb 3.2 oz)   BMI 47.47 kg/m²   23.8lb wt loss since surgeon consult    NAFLD Fibrosis Score is: -.938    NAFLD Score Correlated Fibrosis Severity   <-1.455 F0-F2   -1.455-0.676 Indeterminate Score   >0.676 F3-F4   **Fibrosis Severity Scale: F0 = no fibrosis; F1= mild fibrosis; F2 = moderate fibrosis; F3 = severe fibrosis; F4 = cirrhosis    NAFLD Score Component Values:  Component Value Date   Age: 40 y.o.     BMI: 47.47 kg/m²    IFG or DM: Yes    AST: 17 U/L 9/10/2024   ALT: 14 U/L 9/10/2024   Platelet: 371 Thousands/uL 9/10/2024   Albumin: 4.0 g/dL 9/10/2024     Obed Sanchez Equation:    [id number]  Weight Maintenance 2703kcal  Estimated calories for weight loss 1703-2203kcal ( 1-2# per wk wt loss - sedentary )  Estimated protein needs 75-112g(1.0-1.5 gms/kg IBW )   Fluids:  140  oz total ( Ananya-Segar method )   Free Fluid  112  oz free fluid ([place]-Segar method; -20%)    Weight History   Onset of Obesity: Since adolescence  Family history of obesity: Dad over 600 lbs, Sister over 400 lbs.  Sister had WLS [facility]  Wt Loss Attempts: Exercise  Self Created Diets (Portion Control, Healthy Food Choices, etc.)  Metformin since August, Gym 2x per week - cardio   Patient has tried the above for 6 months or more with insufficient weight loss or weight regain, which is why patient has requested to be evaluated for weight loss surgery today  Maximum Wt Lost: 24lbs currently    Review of History and Medications   Past Medical History: "   Diagnosis Date    Disease of thyroid gland     GERD (gastroesophageal reflux disease)     Hypertension     MRSA infection     bladder/ urine    Nasal congestion     KAMLA on CPAP     Personal history of COVID-19 2022 - has lingering wheezing PASC      Post-acute sequelae of COVID-19 (PASC) 2022     Past Surgical History:   Procedure Laterality Date     SECTION      EYE SURGERY      strabismus    TOOTH EXTRACTION       Social History     Socioeconomic History    Marital status: /Civil Union     Spouse name: Not on file    Number of children: Not on file    Years of education: Not on file    Highest education level: Not on file   Occupational History    Not on file   Tobacco Use    Smoking status: Never    Smokeless tobacco: Never   Vaping Use    Vaping status: Never Used   Substance and Sexual Activity    Alcohol use: Not Currently     Comment: rare    Drug use: Never    Sexual activity: Yes     Partners: Male     Birth control/protection: I.U.D.   Other Topics Concern    Not on file   Social History Narrative    , 3 children    Works at [employer]     Financial Resource Strain: Not on file   Food Insecurity: Not on file   Transportation Needs: Not on file   Physical Activity: Not on file   Stress: Not on file   Social Connections: Not on file   Intimate Partner Violence: Not on file   Housing Stability: Not on file       Current Outpatient Medications:     Azelastine HCl 137 MCG/SPRAY SOLN, instill 1 spray into each nostril twice a day, Disp: 30 mL, Rfl: 1    budesonide-formoterol (Symbicort) 80-4.5 MCG/ACT inhaler, Inhale 2 puffs 2 (two) times a day Rinse mouth after use., Disp: 10.2 g, Rfl: 3    famotidine (PEPCID) 20 mg tablet, Take 1 tablet (20 mg total) by mouth daily at bedtime, Disp: 30 tablet, Rfl: 5    hydroCHLOROthiazide 25 mg tablet, Take 1 tablet (25 mg total) by mouth daily, Disp: 30 tablet, Rfl: 2    levonorgestrel (MIRENA) 20  MCG/24HR IUD, 1 each by Intrauterine route once, Disp: , Rfl:     levothyroxine 75 mcg tablet, swallow 1 tablet once daily, Disp: 90 tablet, Rfl: 1    meloxicam (Mobic) 15 mg tablet, Take 1 tablet (15 mg total) by mouth daily (Patient not taking: Reported on 3/28/2025), Disp: 30 tablet, Rfl: 0    metFORMIN (GLUCOPHAGE) 500 mg tablet, Take 1 tablet (500 mg total) by mouth 2 (two) times a day with meals, Disp: 60 tablet, Rfl: 5    omeprazole (PriLOSEC) 40 MG capsule, take 1 capsule by mouth once daily BEFORE LUNCH, Disp: 90 capsule, Rfl: 1    Food Intake and Lifestyle Assessment   Food Intake Assessment completed via usual diet recall  Wakes 6am-takes thyroid med  Breakfast: 6:30am- banana, metformin pill  Snack: 7-7:30am: yogurt   (Was eating oatmeal for breakfast and was full until lunch)  Lunch: 10-10:30am- tuna with crackers  Snack: 2pm-veggie chips-18 chips (1 portion) OR rice cakes  Dinner: 5-5:51vv-[id number]-kyoqey, meat-boiled or grilled-chicken or steak  Snack: 7:30pm: veggie chips or wheat thins or SF fudge pop  Bed at 9pm, asleep at 10pm  Beverage intake: water. Cut out coffee and soda. Coffee on weekends now.  Protein supplement: none  Estimated protein intake per day: 60-80g=96oz/day  Estimated fluid intake per day: six 16oz water bottles  Meals eaten away from home: used to be once a week- lately nothing, if does- gets subway  Typical meal pattern: 3 meals per day and 3 snacks per day  Eating Behaviors: Consumption of high calorie/ high fat foods, Consumption of high calorie beverages, Large portion sizes, and Frequent snacking/ grazing  Food allergies or intolerances: shellfish.  Does eat shrimp occasionally.  Allergies   Allergen Reactions    Blackberry Flavor - Food Allergy Hives    Shellfish-Derived Products - Food Allergy     Tetanus Toxoid Hives and Edema     Cultural or Orthodoxy considerations: N/A    Physical Assessment  Physical Activity  Types of exercise: yardwork recently.  Sedentary desk  "job  Current physical limitations: B/L knee pain    Psychosocial Assessment   Support systems: spouse and three children: 10, 12, 17  Socioeconomic factors: works at [employer] works from home 11a-8pm sunthurs    Nutrition Diagnosis  Diagnosis: Overweight / Obesity (NC-3.3) and Excessive energy intake (NI-1.5)  Related to: Physical inactivity and Excessive energy intake  As Evidenced by: BMI >25 and Excessive energy intake     Nutrition Prescription: Recommend the following diet  Low fat, Low sugar, High protein, and Regular    Interventions and Teaching   Discussed pre-op and post-op nutrition guidelines.       Patient educated and handouts provided.  Surgical changes to stomach / GI  Capacity of post-surgery stomach  Diet progression  Adequate hydration  Sugar and fat restriction to decrease \"dumping syndrome\"  Expected weight loss  Weight loss plateaus/ possibility of weight regain  Exercise  Suggestions for pre-op diet  Nutrition considerations after surgery  Protein supplements  Meal planning and preparation  Appropriate carbohydrate, protein, and fat intake, and food/fluid choices to maximize safe weight loss, nutrient intake, and tolerance   Dietary and lifestyle changes  Possible problems with poor eating habits  Techniques for self monitoring and keeping daily food journal  Potential for food intolerance after surgery, and ways to deal with them including: lactose intolerance, nausea, reflux, vomiting, diarrhea, food intolerance, appetite changes, gas  Vitamin / Mineral supplementation of Multivitamin with minerals and Vitamin D  Vitamin D 50mcg-started taking in March    Patient is not currently pregnant and doesn't desire to become pregnant a minimum of one year post-op: Has IUD    Education provided to: patient  Barriers to learning: No barriers identified  Readiness to change: action  Prior research on procedure: internet, discussed with provider, and friends or family  Comprehension: verbalizes " understanding   Expected Compliance: good    Recommendations  Pt is an appropriate candidate for surgery. Yes  Bloodwork ordered today    Lab Results   Component Value Date    HGBA1C 6.1 (H) 02/14/2025     Evaluation / Monitoring  Dietitian to Monitor: Eating pattern as discussed Tolerance of nutrition prescription Body weight Lab values Physical activity Bowel pattern    Goals  Eliminate sugar sweetened beverages, Food journal, Exercise 30 minutes 5 times per week, Complete lession plans 1-6, Eat 3 meals per day, and Eliminate mindless snacking    Time Spent:   1 Hour

## 2025-05-12 ENCOUNTER — CLINICAL SUPPORT (OUTPATIENT)
Dept: BARIATRICS | Facility: CLINIC | Age: 40
End: 2025-05-12

## 2025-05-12 VITALS — WEIGHT: 293 LBS | BODY MASS INDEX: 47.47 KG/M2

## 2025-05-12 VITALS — BODY MASS INDEX: 47.47 KG/M2 | WEIGHT: 293 LBS

## 2025-05-12 DIAGNOSIS — G47.33 OSA (OBSTRUCTIVE SLEEP APNEA): ICD-10-CM

## 2025-05-12 DIAGNOSIS — E66.813 CLASS 3 SEVERE OBESITY DUE TO EXCESS CALORIES WITH SERIOUS COMORBIDITY AND BODY MASS INDEX (BMI) OF 50.0 TO 59.9 IN ADULT: Primary | ICD-10-CM

## 2025-05-12 DIAGNOSIS — E66.01 MORBID OBESITY (HCC): ICD-10-CM

## 2025-05-12 DIAGNOSIS — K21.9 GASTROESOPHAGEAL REFLUX DISEASE WITHOUT ESOPHAGITIS: ICD-10-CM

## 2025-05-12 DIAGNOSIS — E03.9 HYPOTHYROIDISM, UNSPECIFIED TYPE: ICD-10-CM

## 2025-05-12 DIAGNOSIS — E66.9 OBESITY, UNSPECIFIED: Primary | ICD-10-CM

## 2025-05-12 DIAGNOSIS — E11.9 TYPE 2 DIABETES MELLITUS WITHOUT COMPLICATION, WITHOUT LONG-TERM CURRENT USE OF INSULIN (HCC): ICD-10-CM

## 2025-05-12 DIAGNOSIS — I10 ESSENTIAL HYPERTENSION: ICD-10-CM

## 2025-05-12 PROCEDURE — RECHECK: Performed by: DIETITIAN, REGISTERED

## 2025-05-12 PROCEDURE — RECHECK

## 2025-05-13 ENCOUNTER — TELEPHONE (OUTPATIENT)
Dept: BARIATRICS | Facility: CLINIC | Age: 40
End: 2025-05-13

## 2025-05-13 ENCOUNTER — PATIENT MESSAGE (OUTPATIENT)
Dept: BARIATRICS | Facility: CLINIC | Age: 40
End: 2025-05-13

## 2025-05-13 DIAGNOSIS — K21.9 GASTROESOPHAGEAL REFLUX DISEASE WITHOUT ESOPHAGITIS: ICD-10-CM

## 2025-05-13 RX ORDER — FAMOTIDINE 20 MG/1
20 TABLET, FILM COATED ORAL
Qty: 30 TABLET | Refills: 5 | Status: SHIPPED | OUTPATIENT
Start: 2025-05-13

## 2025-05-13 NOTE — TELEPHONE ENCOUNTER
Contacted pt in regards to FMLA paperwork sent through [employer]. Pictures of FMLA were not suitable for filling out, pt stated will mail FMLA paperwork to office as pt does not have access to fax machine. Pt stated wanted to take 4 wks and discussed about $15 processing fee. Pt will pay processing fee once paperwork is received.

## 2025-05-14 ENCOUNTER — APPOINTMENT (OUTPATIENT)
Dept: LAB | Facility: CLINIC | Age: 40
End: 2025-05-14
Attending: SURGERY
Payer: COMMERCIAL

## 2025-05-14 ENCOUNTER — RESULTS FOLLOW-UP (OUTPATIENT)
Dept: OTHER | Facility: HOSPITAL | Age: 40
End: 2025-05-14

## 2025-05-14 DIAGNOSIS — J34.89 NASAL DRAINAGE: ICD-10-CM

## 2025-05-14 DIAGNOSIS — I10 ESSENTIAL HYPERTENSION: ICD-10-CM

## 2025-05-14 DIAGNOSIS — E03.9 HYPOTHYROIDISM, UNSPECIFIED TYPE: ICD-10-CM

## 2025-05-14 DIAGNOSIS — R73.03 PREDIABETES: ICD-10-CM

## 2025-05-14 DIAGNOSIS — R05.3 CHRONIC COUGH: ICD-10-CM

## 2025-05-14 DIAGNOSIS — G47.33 OSA (OBSTRUCTIVE SLEEP APNEA): ICD-10-CM

## 2025-05-14 DIAGNOSIS — E66.813 CLASS 3 SEVERE OBESITY DUE TO EXCESS CALORIES WITH SERIOUS COMORBIDITY AND BODY MASS INDEX (BMI) OF 50.0 TO 59.9 IN ADULT: ICD-10-CM

## 2025-05-14 DIAGNOSIS — K21.9 GASTROESOPHAGEAL REFLUX DISEASE WITHOUT ESOPHAGITIS: ICD-10-CM

## 2025-05-14 DIAGNOSIS — E66.01 MORBID OBESITY (HCC): ICD-10-CM

## 2025-05-14 DIAGNOSIS — E11.9 TYPE 2 DIABETES MELLITUS WITHOUT COMPLICATION, WITHOUT LONG-TERM CURRENT USE OF INSULIN (HCC): ICD-10-CM

## 2025-05-14 LAB
ALBUMIN SERPL BCG-MCNC: 4.2 G/DL (ref 3.5–5)
ALP SERPL-CCNC: 80 U/L (ref 34–104)
ALT SERPL W P-5'-P-CCNC: 38 U/L (ref 7–52)
ANION GAP SERPL CALCULATED.3IONS-SCNC: 16 MMOL/L (ref 4–13)
AST SERPL W P-5'-P-CCNC: 36 U/L (ref 13–39)
BILIRUB SERPL-MCNC: 0.41 MG/DL (ref 0.2–1)
BUN SERPL-MCNC: 11 MG/DL (ref 5–25)
CALCIUM SERPL-MCNC: 9.2 MG/DL (ref 8.4–10.2)
CHLORIDE SERPL-SCNC: 97 MMOL/L (ref 96–108)
CHOLEST SERPL-MCNC: 163 MG/DL (ref ?–200)
CO2 SERPL-SCNC: 25 MMOL/L (ref 21–32)
CREAT SERPL-MCNC: 0.89 MG/DL (ref 0.6–1.3)
ERYTHROCYTE [DISTWIDTH] IN BLOOD BY AUTOMATED COUNT: 14.6 % (ref 11.6–15.1)
EST. AVERAGE GLUCOSE BLD GHB EST-MCNC: 134 MG/DL
GFR SERPL CREATININE-BSD FRML MDRD: 82 ML/MIN/1.73SQ M
GLUCOSE P FAST SERPL-MCNC: 98 MG/DL (ref 65–99)
HBA1C MFR BLD: 6.3 %
HCT VFR BLD AUTO: 36.7 % (ref 34.8–46.1)
HDLC SERPL-MCNC: 40 MG/DL
HGB BLD-MCNC: 11.6 G/DL (ref 11.5–15.4)
LDLC SERPL CALC-MCNC: 87 MG/DL (ref 0–100)
MCH RBC QN AUTO: 25.2 PG (ref 26.8–34.3)
MCHC RBC AUTO-ENTMCNC: 31.6 G/DL (ref 31.4–37.4)
MCV RBC AUTO: 80 FL (ref 82–98)
NONHDLC SERPL-MCNC: 123 MG/DL
PLATELET # BLD AUTO: 345 THOUSANDS/UL (ref 149–390)
PMV BLD AUTO: 9.9 FL (ref 8.9–12.7)
POTASSIUM SERPL-SCNC: 3.2 MMOL/L (ref 3.5–5.3)
PROT SERPL-MCNC: 7.5 G/DL (ref 6.4–8.4)
RBC # BLD AUTO: 4.6 MILLION/UL (ref 3.81–5.12)
SODIUM SERPL-SCNC: 138 MMOL/L (ref 135–147)
TRIGL SERPL-MCNC: 179 MG/DL (ref ?–150)
TSH SERPL DL<=0.05 MIU/L-ACNC: 2.16 UIU/ML (ref 0.45–4.5)
WBC # BLD AUTO: 7.37 THOUSAND/UL (ref 4.31–10.16)

## 2025-05-14 PROCEDURE — 80053 COMPREHEN METABOLIC PANEL: CPT

## 2025-05-14 PROCEDURE — 36415 COLL VENOUS BLD VENIPUNCTURE: CPT

## 2025-05-14 PROCEDURE — 80061 LIPID PANEL: CPT

## 2025-05-14 PROCEDURE — 83036 HEMOGLOBIN GLYCOSYLATED A1C: CPT

## 2025-05-14 PROCEDURE — 84443 ASSAY THYROID STIM HORMONE: CPT

## 2025-05-14 PROCEDURE — 85027 COMPLETE CBC AUTOMATED: CPT

## 2025-05-15 ENCOUNTER — RA CDI HCC (OUTPATIENT)
Dept: OTHER | Facility: HOSPITAL | Age: 40
End: 2025-05-15

## 2025-05-15 RX ORDER — AZELASTINE HYDROCHLORIDE 137 UG/1
1 SPRAY, METERED NASAL 2 TIMES DAILY
Qty: 30 ML | Refills: 1 | Status: SHIPPED | OUTPATIENT
Start: 2025-05-15

## 2025-05-15 RX ORDER — LEVOTHYROXINE SODIUM 75 UG/1
75 TABLET ORAL DAILY
Qty: 90 TABLET | Refills: 1 | Status: SHIPPED | OUTPATIENT
Start: 2025-05-15

## 2025-05-15 RX ORDER — HYDROCHLOROTHIAZIDE 25 MG/1
25 TABLET ORAL DAILY
Qty: 30 TABLET | Refills: 5 | Status: SHIPPED | OUTPATIENT
Start: 2025-05-15

## 2025-05-22 ENCOUNTER — OFFICE VISIT (OUTPATIENT)
Dept: CARDIOLOGY CLINIC | Facility: CLINIC | Age: 40
End: 2025-05-22
Payer: COMMERCIAL

## 2025-05-22 VITALS
RESPIRATION RATE: 17 BRPM | DIASTOLIC BLOOD PRESSURE: 92 MMHG | SYSTOLIC BLOOD PRESSURE: 130 MMHG | HEART RATE: 96 BPM | BODY MASS INDEX: 47.47 KG/M2 | HEIGHT: 68 IN | OXYGEN SATURATION: 100 %

## 2025-05-22 DIAGNOSIS — I10 ESSENTIAL HYPERTENSION: ICD-10-CM

## 2025-05-22 DIAGNOSIS — E66.01 MORBID OBESITY (HCC): Primary | ICD-10-CM

## 2025-05-22 DIAGNOSIS — G47.33 OSA (OBSTRUCTIVE SLEEP APNEA): ICD-10-CM

## 2025-05-22 DIAGNOSIS — R06.02 SHORTNESS OF BREATH: ICD-10-CM

## 2025-05-22 PROCEDURE — 99203 OFFICE O/P NEW LOW 30 MIN: CPT | Performed by: INTERNAL MEDICINE

## 2025-05-22 NOTE — PROGRESS NOTES
Cardiology Consultation     Shelley Noman  [id number]  1985  [facility]  [facility] [place] [id number]    This patient presents for cardiology consultation and evaluation and for preoperative cardiovascular risk assessment for possible bariatric surgery.    Patient does have a history of morbid obesity but has been trying to lose weight.  She has been successful and in addition considering bariatric surgery in July.  Patient does take medications for hypertension and diabetes and is hopeful that these medications can be either decreased or discontinued after her surgery.    Patient also has history of sleep apnea and is using the CPAP.  Patient denies any chest smoking.  Patient does not have a history of coronary artery disease or MI in the past.  No history of rheumatic fever or heart murmur.  Patient does have shortness of breath with exertion.  Patient states that she has been compliant with all her present medications.  Patient also has slight leg edema.  No history of palpitations or dizziness.  She states that she has been compliant with all her present medications.  No family history of premature coronary artery disease.        1. Morbid obesity (HCC)        2. Essential hypertension  Echo complete w/ contrast if indicated      3. KAMLA (obstructive sleep apnea)        4. Shortness of breath  Echo complete w/ contrast if indicated        Problem List[1]  Past Medical History[2]  Social History     Socioeconomic History    Marital status: /Civil Union     Spouse name: Not on file    Number of children: Not on file    Years of education: Not on file    Highest education level: Not on file   Occupational History    Not on file   Tobacco Use    Smoking status: Never    Smokeless tobacco: Never   Vaping Use    Vaping status: Never Used   Substance and Sexual Activity    Alcohol use: Not Currently      "Comment: rare    Drug use: Never    Sexual activity: Yes     Partners: Male     Birth control/protection: I.U.D.   Other Topics Concern    Not on file   Social History Narrative    , 3 children    Works at [employer]     Social Drivers of Health     Financial Resource Strain: Not on file   Food Insecurity: Not on file   Transportation Needs: Not on file   Physical Activity: Not on file   Stress: Not on file   Social Connections: Not on file   Intimate Partner Violence: Not on file   Housing Stability: Not on file      Family History[3]  Past Surgical History[4]  Current Medications[5]  Allergies   Allergen Reactions    Blackberry Flavor - Food Allergy Hives    Shellfish-Derived Products - Food Allergy     Tetanus Toxoid Hives and Edema     Vitals:    05/22/25 0942   BP: 130/92   BP Location: Right arm   Patient Position: Sitting   Cuff Size: Large   Pulse: 96   Resp: 17   SpO2: 100%   Height: 5' 8\" (1.727 m)       Labs:  Appointment on 05/14/2025   Component Date Value    WBC 05/14/2025 7.37     RBC 05/14/2025 4.60     Hemoglobin 05/14/2025 11.6     Hematocrit 05/14/2025 36.7     MCV 05/14/2025 80 (L)     MCH 05/14/2025 25.2 (L)     MCHC 05/14/2025 31.6     RDW 05/14/2025 14.6     Platelets 05/14/2025 345     MPV 05/14/2025 9.9     Sodium 05/14/2025 138     Potassium 05/14/2025 3.2 (L)     Chloride 05/14/2025 97     CO2 05/14/2025 25     ANION GAP 05/14/2025 16 (H)     BUN 05/14/2025 11     Creatinine 05/14/2025 0.89     Glucose, Fasting 05/14/2025 98     Calcium 05/14/2025 9.2     AST 05/14/2025 36     ALT 05/14/2025 38     Alkaline Phosphatase 05/14/2025 80     Total Protein 05/14/2025 7.5     Albumin 05/14/2025 4.2     Total Bilirubin 05/14/2025 0.41     eGFR 05/14/2025 82     Hemoglobin A1C 05/14/2025 6.3 (H)     EAG 05/14/2025 134     Cholesterol 05/14/2025 163     Triglycerides 05/14/2025 179 (H)     HDL, Direct 05/14/2025 40 (L)     LDL Calculated 05/14/2025 87     Non-HDL-Chol (CHOL-HDL) 05/14/2025 123  "    TSH 3RD [facility] 05/14/2025 2.159      Imaging: No results found.    Review of Systems:  Review of Systems  REVIEW OF SYSTEMS:  Constitutional:  Denies fever or chills   Eyes:  Denies change in visual acuity   HENT:  Denies nasal congestion or sore throat   Respiratory:  shortness of breath   Cardiovascular:  Denies chest pain or edema   GI:  Denies abdominal pain, nausea, vomiting, bloody stools or diarrhea   :  Denies dysuria, frequency, difficulty in micturition and nocturia  Musculoskeletal:  Denies back pain or joint pain   Neurologic:  Denies headache, focal weakness or sensory changes   Endocrine:  Denies polyuria or polydipsia   Lymphatic:  Denies swollen glands   Psychiatric:  Denies depression or anxiety    Physical Exam:  Physical Exam  PHYSICAL EXAM:  General:  Patient is not in acute distress   Head: Normocephalic, Atraumatic.  HEENT:  Both pupils normal-size atraumatic, normocephalic, nonicteric  Neck:  JVP not raised. Trachea central. No carotid bruit  Respiratory:  normal breath sounds no crackles. no rhonchi  Cardiovascular:  Regular rate and rhythm no S3 no murmurs  GI:  Abdomen soft nontender. No organomegaly.   Lymphatic:  No cervical or inguinal lymphadenopathy  Neurologic:  Patient is awake alert, oriented . Grossly nonfocal  Extremities trace to 1+ edema    EKG done March 2025 showed sinus rhythm with incomplete right bundle branch block.  This has been reviewed with patient and family.    Discussion/Summary:    This patient who has history of obesity, sleep apnea, hypertension, hyperlipidemia is being considered for bariatric surgery.    Results of EKG reviewed with patient and family.    Patient does have symptoms of shortness of breath.  Patient will be scheduled for an echocardiogram to evaluate ejection, valves and look for any evidence of pulmonary hypertension given history of obesity and sleep apnea.    If this is unremarkable, she will be considered low cardiac risk for the  planned procedure.  This has been discussed with patient and family.    Patient will continue to lose weight and continue diet and risk factor modification.    Symptoms to watch out from cardiac standpoint which would indicate the need for further cardiac evaluation discussed.    Will update final preoperative cardiovascular risk assessment after results of echo are available.  Patient is agreeable with the plan of care.         [1]   Patient Active Problem List  Diagnosis    GERD (gastroesophageal reflux disease)    Long-term current use of proton pump inhibitor therapy    Irritable bowel syndrome with diarrhea    Morbid obesity (HCC)    Hypothyroidism    History of viral illness    Mass of right side of neck    Hx of wheezing    Wheezing    Elevated fecal calprotectin    Low HDL (under 40)    Type 2 diabetes mellitus without complication (HCC)    Lymphadenopathy, cervical    Lymphocytosis    Essential hypertension    New onset of headaches    Chronic pain of both knees    Excessive daytime sleepiness    KAMLA (obstructive sleep apnea)    Uncomplicated asthma, unspecified asthma severity, unspecified whether persistent    Class 3 severe obesity due to excess calories with serious comorbidity and body mass index (BMI) of 50.0 to 59.9 in adult   [2]   Past Medical History:  Diagnosis Date    Disease of thyroid gland     GERD (gastroesophageal reflux disease)     Hypertension     MRSA infection     bladder/ urine    Nasal congestion     KAMLA on CPAP     Personal history of COVID-19 01/20/2022 12/03/2021 - has lingering wheezing PASC      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   [3]   Family History  Problem Relation Name Age of Onset    COPD Mother Breonna patel     Colon polyps Mother Breonna patel     Diabetes Father Juan jorge     Colon polyps Father Juan jorge     Sleep apnea Father Juan jorge     No Known Problems Sister      No Known Problems Sister      Gallbladder disease Sister      No Known Problems  Brother      No Known Problems Brother      No Known Problems Brother      No Known Problems Brother      No Known Problems Daughter      No Known Problems Son      Diabetes Maternal Grandmother      Diabetes Maternal Grandfather      Diabetes Paternal Grandmother      Diabetes Paternal Grandfather     [4]   Past Surgical History:  Procedure Laterality Date     SECTION      EYE SURGERY      strabismus    TOOTH EXTRACTION     [5]   Current Outpatient Medications:     Azelastine HCl 137 MCG/SPRAY SOLN, 1 spray by Each Nare route 2 (two) times a day, Disp: 30 mL, Rfl: 1    budesonide-formoterol (Symbicort) 80-4.5 MCG/ACT inhaler, Inhale 2 puffs 2 (two) times a day Rinse mouth after use., Disp: 10.2 g, Rfl: 3    famotidine (PEPCID) 20 mg tablet, Take 1 tablet (20 mg total) by mouth daily at bedtime, Disp: 30 tablet, Rfl: 5    hydroCHLOROthiazide 25 mg tablet, Take 1 tablet (25 mg total) by mouth daily, Disp: 30 tablet, Rfl: 5    levonorgestrel (MIRENA) 20 MCG/24HR IUD, 1 each by Intrauterine route once, Disp: , Rfl:     levothyroxine 75 mcg tablet, Take 1 tablet (75 mcg total) by mouth daily, Disp: 90 tablet, Rfl: 1    metFORMIN (GLUCOPHAGE) 500 mg tablet, Take 1 tablet (500 mg total) by mouth 2 (two) times a day with meals, Disp: 60 tablet, Rfl: 5    omeprazole (PriLOSEC) 40 MG capsule, take 1 capsule by mouth once daily BEFORE LUNCH, Disp: 90 capsule, Rfl: 1

## 2025-06-04 DIAGNOSIS — I10 ESSENTIAL HYPERTENSION: ICD-10-CM

## 2025-06-05 RX ORDER — HYDROCHLOROTHIAZIDE 25 MG/1
25 TABLET ORAL DAILY
Qty: 30 TABLET | Refills: 5 | Status: SHIPPED | OUTPATIENT
Start: 2025-06-05 | End: 2025-06-06 | Stop reason: SDUPTHER

## 2025-06-06 ENCOUNTER — OFFICE VISIT (OUTPATIENT)
Dept: FAMILY MEDICINE CLINIC | Facility: CLINIC | Age: 40
End: 2025-06-06
Payer: COMMERCIAL

## 2025-06-06 VITALS
RESPIRATION RATE: 18 BRPM | SYSTOLIC BLOOD PRESSURE: 142 MMHG | HEART RATE: 98 BPM | WEIGHT: 290.2 LBS | BODY MASS INDEX: 43.98 KG/M2 | TEMPERATURE: 98.1 F | HEIGHT: 68 IN | DIASTOLIC BLOOD PRESSURE: 90 MMHG | OXYGEN SATURATION: 97 %

## 2025-06-06 DIAGNOSIS — I10 ESSENTIAL HYPERTENSION: ICD-10-CM

## 2025-06-06 DIAGNOSIS — R73.03 PREDIABETES: Primary | ICD-10-CM

## 2025-06-06 DIAGNOSIS — E66.01 MORBID OBESITY (HCC): ICD-10-CM

## 2025-06-06 DIAGNOSIS — E03.9 HYPOTHYROIDISM, UNSPECIFIED TYPE: ICD-10-CM

## 2025-06-06 DIAGNOSIS — G47.33 OSA (OBSTRUCTIVE SLEEP APNEA): ICD-10-CM

## 2025-06-06 DIAGNOSIS — L83 ACANTHOSIS NIGRICANS: ICD-10-CM

## 2025-06-06 PROCEDURE — 99214 OFFICE O/P EST MOD 30 MIN: CPT | Performed by: FAMILY MEDICINE

## 2025-06-06 RX ORDER — HYDROCHLOROTHIAZIDE 25 MG/1
25 TABLET ORAL DAILY
Qty: 30 TABLET | Refills: 5 | Status: SHIPPED | OUTPATIENT
Start: 2025-06-06

## 2025-06-06 RX ORDER — LEVOTHYROXINE SODIUM 75 UG/1
75 TABLET ORAL DAILY
Qty: 90 TABLET | Refills: 1 | Status: SHIPPED | OUTPATIENT
Start: 2025-06-06

## 2025-06-06 NOTE — PROGRESS NOTES
Name: Shelley Tineo      : 1985      MRN: [id number]  Encounter Provider: Tamica Foster DO  Encounter Date: 2025   Encounter department: [facility] [place]  :  Assessment & Plan  Prediabetes  Controlled, stable, cpm  Orders:    metFORMIN (GLUCOPHAGE) 500 mg tablet; Take 1 tablet (500 mg total) by mouth 2 (two) times a day with meals    Morbid obesity (HCC)  Patient is interested in and is in process of pursuing bariatric surgery   She has lost 45# since first consult appt with bariatric surgeon in March  For now, continue metformin tx      Orders:    metFORMIN (GLUCOPHAGE) 500 mg tablet; Take 1 tablet (500 mg total) by mouth 2 (two) times a day with meals    Essential hypertension  BP elevated today, but pt states she ran out of her BP meds about 2 days ago- states she was waiting for [facility] to transfer her refills to [facility] ([facility] is closing permanently)  Orders:    hydroCHLOROthiazide 25 mg tablet; Take 1 tablet (25 mg total) by mouth daily    KAMLA (obstructive sleep apnea)  Started CPAP and states her asthma sx have been much better since then       Hypothyroidism, unspecified type  Controlled, stable, cpm  Orders:    levothyroxine 75 mcg tablet; Take 1 tablet (75 mcg total) by mouth daily    Acanthosis nigricans  Further evidence metabolic dysfunction/impaired glucose metabolism              Chief Complaint   Patient presents with    Follow-up       History of Present Illness   Scheduled f/u  States she has been doing well  BP elevated on rooming - 140/90 - pt states she ran out of her BP meds about 2 days ago- was waiting for [facility] to transfer her refills to [facility]  States asthma has been better since starting using CPAP, also ankle swelling better since losing some weight - seeing bariatrics for possible gastric bypass  Lost 45# since first consult appt with bariatric surgeon in March      Review of Systems   Constitutional: Negative.    Respiratory:  Negative for choking,  "chest tightness, shortness of breath, wheezing and stridor.    Cardiovascular:  Negative for chest pain and palpitations.   Neurological: Negative.    Hematological: Negative.    All other systems reviewed and are negative.      Objective   /90 (BP Location: Left arm, Patient Position: Sitting, Cuff Size: Large)   Pulse 98   Temp 98.1 °F (36.7 °C) (Tympanic)   Resp 18   Ht 5' 8\" (1.727 m)   Wt 132 kg (290 lb 3.2 oz)   SpO2 97%   BMI 44.12 kg/m²      Physical Exam  Vitals and nursing note reviewed.   Constitutional:       General: She is not in acute distress.     Appearance: She is well-groomed. She is not ill-appearing, toxic-appearing or diaphoretic.   HENT:      Head: Normocephalic and atraumatic.      Nose: Nose normal.      Mouth/Throat:      Mouth: Mucous membranes are moist.     Eyes:      Conjunctiva/sclera: Conjunctivae normal.       Cardiovascular:      Rate and Rhythm: Normal rate and regular rhythm.      Heart sounds: Normal heart sounds.   Pulmonary:      Effort: Pulmonary effort is normal.      Breath sounds: Normal breath sounds and air entry.     Musculoskeletal:      Right lower leg: No edema.      Left lower leg: No edema.     Skin:     General: Skin is warm and dry.      Coloration: Skin is not pale.          Neurological:      Mental Status: She is alert and oriented to person, place, and time.     Psychiatric:         Mood and Affect: Mood normal.         Behavior: Behavior normal. Behavior is cooperative.         Cognition and Memory: Cognition normal.         "

## 2025-06-06 NOTE — ASSESSMENT & PLAN NOTE
Patient is interested in and is in process of pursuing bariatric surgery   She has lost 45# since first consult appt with bariatric surgeon in March  For now, continue metformin tx      Orders:    metFORMIN (GLUCOPHAGE) 500 mg tablet; Take 1 tablet (500 mg total) by mouth 2 (two) times a day with meals

## 2025-06-06 NOTE — ASSESSMENT & PLAN NOTE
BP elevated today, but pt states she ran out of her BP meds about 2 days ago- states she was waiting for [facility] to transfer her refills to [facility] ([facility] is closing permanently)  Orders:    hydroCHLOROthiazide 25 mg tablet; Take 1 tablet (25 mg total) by mouth daily

## 2025-06-06 NOTE — ASSESSMENT & PLAN NOTE
Controlled, stable, cpm  Orders:    levothyroxine 75 mcg tablet; Take 1 tablet (75 mcg total) by mouth daily

## 2025-06-06 NOTE — ASSESSMENT & PLAN NOTE
Controlled, stable, cpm  Orders:    metFORMIN (GLUCOPHAGE) 500 mg tablet; Take 1 tablet (500 mg total) by mouth 2 (two) times a day with meals

## 2025-06-11 ENCOUNTER — RESULTS FOLLOW-UP (OUTPATIENT)
Dept: CARDIOLOGY CLINIC | Facility: CLINIC | Age: 40
End: 2025-06-11

## 2025-06-11 ENCOUNTER — HOSPITAL ENCOUNTER (OUTPATIENT)
Dept: NON INVASIVE DIAGNOSTICS | Facility: CLINIC | Age: 40
Discharge: HOME | End: 2025-06-11
Payer: COMMERCIAL

## 2025-06-11 ENCOUNTER — TELEPHONE (OUTPATIENT)
Dept: BARIATRICS | Facility: CLINIC | Age: 40
End: 2025-06-11

## 2025-06-11 VITALS
HEART RATE: 86 BPM | HEIGHT: 68 IN | DIASTOLIC BLOOD PRESSURE: 90 MMHG | WEIGHT: 290 LBS | SYSTOLIC BLOOD PRESSURE: 142 MMHG | BODY MASS INDEX: 43.95 KG/M2

## 2025-06-11 DIAGNOSIS — I10 ESSENTIAL HYPERTENSION: ICD-10-CM

## 2025-06-11 DIAGNOSIS — R06.02 SHORTNESS OF BREATH: ICD-10-CM

## 2025-06-11 LAB
AORTIC ROOT: 2.6 CM
ASCENDING AORTA: 3 CM
BSA FOR ECHO PROCEDURE: 2.39 M2
E WAVE DECELERATION TIME: 202 MS
E/A RATIO: 1.15
FRACTIONAL SHORTENING: 33 (ref 28–44)
INTERVENTRICULAR SEPTUM IN DIASTOLE (PARASTERNAL SHORT AXIS VIEW): 1.1 CM
INTERVENTRICULAR SEPTUM: 1.1 CM (ref 0.6–1.1)
LAAS-AP2: 11.8 CM2
LAAS-AP4: 9.9 CM2
LEFT ATRIUM SIZE: 2.7 CM
LEFT ATRIUM VOLUME (MOD BIPLANE): 21 ML
LEFT ATRIUM VOLUME INDEX (MOD BIPLANE): 8.8 ML/M2
LEFT INTERNAL DIMENSION IN SYSTOLE: 2.9 CM (ref 2.1–4)
LEFT VENTRICULAR INTERNAL DIMENSION IN DIASTOLE: 4.3 CM (ref 3.5–6)
LEFT VENTRICULAR POSTERIOR WALL IN END DIASTOLE: 1.1 CM
LEFT VENTRICULAR STROKE VOLUME: 51 ML
LV EF US.2D.A4C+ESTIMATED: 57 %
LVSV (TEICH): 51 ML
MV E'TISSUE VEL-SEP: 11 CM/S
MV PEAK A VEL: 0.78 M/S
MV PEAK E VEL: 90 CM/S
MV STENOSIS PRESSURE HALF TIME: 59 MS
MV VALVE AREA P 1/2 METHOD: 3.73
RIGHT ATRIUM AREA SYSTOLE A4C: 9 CM2
RIGHT VENTRICLE ID DIMENSION: 2.7 CM
SL CV LEFT ATRIUM LENGTH A2C: 4.6 CM
SL CV LV EF: 57
SL CV PED ECHO LEFT VENTRICLE DIASTOLIC VOLUME (MOD BIPLANE) 2D: 84 ML
SL CV PED ECHO LEFT VENTRICLE SYSTOLIC VOLUME (MOD BIPLANE) 2D: 33 ML
TR MAX PG: 5 MMHG
TR PEAK VELOCITY: 1.1 M/S
TRICUSPID ANNULAR PLANE SYSTOLIC EXCURSION: 2 CM
TRICUSPID VALVE PEAK REGURGITATION VELOCITY: 1.14 M/S

## 2025-06-11 PROCEDURE — 93306 TTE W/DOPPLER COMPLETE: CPT | Performed by: INTERNAL MEDICINE

## 2025-06-11 PROCEDURE — 93306 TTE W/DOPPLER COMPLETE: CPT

## 2025-06-12 ENCOUNTER — TELEPHONE (OUTPATIENT)
Dept: BARIATRICS | Facility: CLINIC | Age: 40
End: 2025-06-12

## 2025-06-12 NOTE — TELEPHONE ENCOUNTER
LVM for pt letting pt know we received the [facility] paperwork. All that is needed is the surgery date and $15 processing fee. Pt advised to call office to continue process.

## 2025-06-12 NOTE — TELEPHONE ENCOUNTER
LVM for pt letting pt know we received the [facility] paperwork. All that is needed is the surgery date and $15 processing fee. Pt advised to call office to continue process.

## 2025-06-16 ENCOUNTER — TELEPHONE (OUTPATIENT)
Dept: BARIATRICS | Facility: CLINIC | Age: 40
End: 2025-06-16

## 2025-06-16 NOTE — TELEPHONE ENCOUNTER
Left a message for patient to call me to let me know what procedure she wants so I can submit her case to insurance.

## 2025-06-20 ENCOUNTER — TELEPHONE (OUTPATIENT)
Dept: BARIATRICS | Facility: CLINIC | Age: 40
End: 2025-06-20

## 2025-06-20 ENCOUNTER — PREP FOR PROCEDURE (OUTPATIENT)
Dept: BARIATRICS | Facility: CLINIC | Age: 40
End: 2025-06-20

## 2025-06-20 DIAGNOSIS — K21.9 ESOPHAGEAL REFLUX: ICD-10-CM

## 2025-06-20 DIAGNOSIS — G47.33 OSA ON CPAP: ICD-10-CM

## 2025-06-20 DIAGNOSIS — E66.01 MORBID OBESITY (HCC): Primary | ICD-10-CM

## 2025-06-20 DIAGNOSIS — I10 ESSENTIAL HYPERTENSION, BENIGN: ICD-10-CM

## 2025-06-24 ENCOUNTER — PATIENT MESSAGE (OUTPATIENT)
Dept: BARIATRICS | Facility: CLINIC | Age: 40
End: 2025-06-24

## 2025-06-29 DIAGNOSIS — R05.3 CHRONIC COUGH: ICD-10-CM

## 2025-06-29 DIAGNOSIS — K21.9 GASTROESOPHAGEAL REFLUX DISEASE WITHOUT ESOPHAGITIS: ICD-10-CM

## 2025-06-29 DIAGNOSIS — J34.89 NASAL DRAINAGE: ICD-10-CM

## 2025-06-30 ENCOUNTER — TELEPHONE (OUTPATIENT)
Dept: BARIATRICS | Facility: CLINIC | Age: 40
End: 2025-06-30

## 2025-06-30 RX ORDER — OMEPRAZOLE 40 MG/1
40 CAPSULE, DELAYED RELEASE ORAL
Qty: 90 CAPSULE | Refills: 1 | Status: SHIPPED | OUTPATIENT
Start: 2025-06-30

## 2025-06-30 RX ORDER — FAMOTIDINE 20 MG/1
20 TABLET, FILM COATED ORAL
Qty: 90 TABLET | Refills: 1 | Status: SHIPPED | OUTPATIENT
Start: 2025-06-30

## 2025-06-30 RX ORDER — AZELASTINE HYDROCHLORIDE 137 UG/1
1 SPRAY, METERED NASAL 2 TIMES DAILY
Qty: 30 ML | Refills: 0 | Status: SHIPPED | OUTPATIENT
Start: 2025-06-30

## 2025-06-30 NOTE — TELEPHONE ENCOUNTER
LVM for pt in regards to confirming appts on 7/10/25 at 8 am and 12 pm for pre-op class and with Dr. Moreno. Patient still unconfirmed.

## 2025-07-01 ENCOUNTER — OFFICE VISIT (OUTPATIENT)
Dept: PULMONOLOGY | Facility: CLINIC | Age: 40
End: 2025-07-01
Payer: COMMERCIAL

## 2025-07-01 VITALS
TEMPERATURE: 98.7 F | OXYGEN SATURATION: 98 % | BODY MASS INDEX: 42.16 KG/M2 | DIASTOLIC BLOOD PRESSURE: 78 MMHG | WEIGHT: 278.2 LBS | SYSTOLIC BLOOD PRESSURE: 140 MMHG | HEART RATE: 89 BPM | RESPIRATION RATE: 18 BRPM | HEIGHT: 68 IN

## 2025-07-01 DIAGNOSIS — G47.33 OSA (OBSTRUCTIVE SLEEP APNEA): Primary | ICD-10-CM

## 2025-07-01 DIAGNOSIS — J45.909 UNCOMPLICATED ASTHMA, UNSPECIFIED ASTHMA SEVERITY, UNSPECIFIED WHETHER PERSISTENT: ICD-10-CM

## 2025-07-01 DIAGNOSIS — E66.01 MORBID OBESITY (HCC): ICD-10-CM

## 2025-07-01 PROCEDURE — 99214 OFFICE O/P EST MOD 30 MIN: CPT

## 2025-07-01 NOTE — ASSESSMENT & PLAN NOTE
- Scheduled for Mandy-en-Y gastric bypass surgery on 7/28  -Continue inhaler therapy and CPAP perioperatively

## 2025-07-01 NOTE — PROGRESS NOTES
Follow-up  Visit - Pulmonary Medicine   Name: Shelley Tineo      : 1985      MRN: [id number]  Encounter Provider: MANOLO Huizar  Encounter Date: 2025   Encounter department: [facility]  :  Assessment & Plan  KAMLA (obstructive sleep apnea)  - Mild KAMLA with JYOTI 7.6 on prior HST  -Recently went for mask fitting, got a new style mask.  Wearing nightly, sleeping well and benefiting from use.  -Reviewed compliance data on patient's phone flower from 2025 - 2025.  Showed average usage 5 hours 21 minutes with > 4 hours use 89%.  Residual AHI 0.39  -Continue wearing CPAP nightly at current settings       Uncomplicated asthma, unspecified asthma severity, unspecified whether persistent  - Prior PFTs in  normal  -Eosinophils < 300  - Wheezing improved with low-dose Symbicort then seemed to resolve with start of CPAP.  Using Symbicort as needed.  Lungs are clear on exam today  -Continue low-dose Symbicort.  Consider discontinuing at next office visit       Morbid obesity (HCC)  - Scheduled for Mandy-en-Y gastric bypass surgery on   -Continue inhaler therapy and CPAP perioperatively         Return in about 6 months (around 2026).    History of Present Illness   Shelley Tineo is a 40 y.o. female who is here today for a follow-up visit.  Last seen in the office 3 months ago.  Maintained on low-dose Symbicort for suspected asthma and CPAP nightly for mild KAMLA.  She was sent for mask fitting appointment due to mask/headgear discomfort.  She received a new nasal mask, tolerating better.  She is wearing CPAP nightly, sleeping well and feeling rested.  Patient states her wheezing has completely resolved since starting CPAP.  Has no cough or mucus.  Mild HOLLINS.  Has not used her Symbicort inhaler since shortly after starting CPAP.    Review of Systems    Aside from what is mentioned in the HPI, ROS is otherwise negative         Medical History Reviewed by provider this  "encounter:  Tobacco  Allergies  Meds  Problems  Med Hx  Surg Hx  Fam Hx     .    Objective   /78 (BP Location: Left arm, Patient Position: Sitting, Cuff Size: Standard) Comment (BP Location): Left forarm  Pulse 89   Temp 98.7 °F (37.1 °C) (Temporal)   Resp 18   Ht 5' 8\" (1.727 m)   Wt 126 kg (278 lb 3.2 oz)   SpO2 98%   BMI 42.30 kg/m²     Physical Exam  Vitals and nursing note reviewed.   Constitutional:       General: She is not in acute distress.     Appearance: Normal appearance. She is well-developed. She is obese.     Cardiovascular:      Rate and Rhythm: Normal rate and regular rhythm.      Heart sounds: Normal heart sounds, S1 normal and S2 normal. No murmur heard.  Pulmonary:      Effort: Pulmonary effort is normal.      Breath sounds: Normal breath sounds. No decreased breath sounds, wheezing, rhonchi or rales.     Musculoskeletal:         General: No swelling.      Right lower leg: No edema.      Left lower leg: No edema.     Neurological:      Mental Status: She is alert.     Psychiatric:         Mood and Affect: Mood and affect normal.         Behavior: Behavior normal. Behavior is cooperative.           Diagnostic Data:  Labs: I personally reviewed the most recent laboratory data pertinent to today's visit.      Radiology results:        PFT/spirometry results: Reviewed study from 10/14/2022      Oximetry testing:      Other studies:      MANOLO Huizar      "

## 2025-07-01 NOTE — ASSESSMENT & PLAN NOTE
- Prior PFTs in 2022 normal  -Eosinophils < 300  - Wheezing improved with low-dose Symbicort then seemed to resolve with start of CPAP.  Using Symbicort as needed.  Lungs are clear on exam today  -Continue low-dose Symbicort.  Consider discontinuing at next office visit

## 2025-07-01 NOTE — ASSESSMENT & PLAN NOTE
- Mild KAMLA with JYOTI 7.6 on prior HST  -Recently went for mask fitting, got a new style mask.  Wearing nightly, sleeping well and benefiting from use.  -Reviewed compliance data on patient's phone flower from 6/2/2025 - 7/1/2025.  Showed average usage 5 hours 21 minutes with > 4 hours use 89%.  Residual AHI 0.39  -Continue wearing CPAP nightly at current settings

## 2025-07-03 ENCOUNTER — OFFICE VISIT (OUTPATIENT)
Dept: FAMILY MEDICINE CLINIC | Facility: CLINIC | Age: 40
End: 2025-07-03
Payer: COMMERCIAL

## 2025-07-03 VITALS
OXYGEN SATURATION: 99 % | SYSTOLIC BLOOD PRESSURE: 142 MMHG | BODY MASS INDEX: 41.98 KG/M2 | DIASTOLIC BLOOD PRESSURE: 86 MMHG | WEIGHT: 277 LBS | HEART RATE: 101 BPM | HEIGHT: 68 IN | TEMPERATURE: 98.2 F

## 2025-07-03 DIAGNOSIS — I10 ESSENTIAL HYPERTENSION: Primary | ICD-10-CM

## 2025-07-03 PROCEDURE — 99214 OFFICE O/P EST MOD 30 MIN: CPT | Performed by: FAMILY MEDICINE

## 2025-07-03 NOTE — ASSESSMENT & PLAN NOTE
"Had been started on HCTZ in March for new HTN  Pt states today that when she first started taking she had no problem, but when she began losing weight in April, she started getting episodes of feeling weak, and one day when she was in the sun at an amusement park last week, she had dizziness and N/V - she had called here 2 days ago reporting the episodes, and was advised to hold the HCTZ  Home BP's \"when angry\" per pt 138/88, other times when calm 117/80-85  Has bariatric surgery scheduled for 7/28/25  Continue to hold HCTZ and monitor home BP's, expect that BP will come down even more with post bariatric surgery weight loss       "

## 2025-07-03 NOTE — PROGRESS NOTES
"Name: Shelley Tineo      : 1985      MRN: [id number]  Encounter Provider: Tamica Foster DO  Encounter Date: 7/3/2025   Encounter department: [facility] [place]  :  Assessment & Plan  Essential hypertension  Had been started on HCTZ in March for new HTN  Pt states today that when she first started taking she had no problem, but when she began losing weight in April, she started getting episodes of feeling weak, and one day when she was [place] the sun [place] an amusement park last week, she had dizziness and N/V - she had called here 2 days ago reporting the episodes, and was advised to hold the HCTZ  Home BP's \"when angry\" per pt 138/88, other times when calm 117/80-85  Has bariatric surgery scheduled for 25  Continue to hold HCTZ and monitor home BP's, expect that BP will come down even more with post bariatric surgery weight loss         Chief Complaint   Patient presents with   • Medication Problem     Pt reports is taking blood pressure medication but it is making her sick. Pt states that she feels \"weak and shoulder pain\" when she takes it.          History of Present Illness   Had been started on HCTZ in March for new HTN  Pt states today that when she first started taking she had no problem, but when she began losing weight in April, she started getting episodes of feeling weak, and one day when she was in the sun at an amusement park last week, she had dizziness and N/V - she had called here 2 days ago reporting the episodes, and was advised to hold the HCTZ  Home BP's \"when angry\" per pt 138/88, other times when calm 117/80-85  Has bariatric surgery scheduled for 25      All Conversations: Blood pressure  meds  (Oldest Message First)             Shelley Tineo to [facility] (supporting You)    25  9:39 AM  Hello, I have been experiencing side affects while taking hydrochlorothiazide.I will get really weak then lightheaded. Now its at the point l most " "passing out i have to keep pouring cold water on my head and back of neck to not pass out. Then about an 1/2 hr later I will throw up.   Should I stop this medication. I have lost pretty much weight since I started these meds. I was 336lbs to 278lbs weight today at the doctors    7/1/25  1:48 PM  Jennifer Rodriguez RN routed this conversation to [facility]    7/1/25  2:06 PM  Treasure Lange MA routed this conversation to Me   Me to [facility]     7/1/25  2:18 PM  Please advise pt to hold the HCTZ for now, and schedule problem visit appt here ([facility]) ASAP for this.  Of note, pt's BP at her pulmonologist appt today was 140/78 per chart review        Hypertension  This is a new problem. The current episode started more than 1 month ago. The problem has been rapidly worsening since onset. The problem is resistant. Associated symptoms include headaches, malaise/fatigue and palpitations. Pertinent negatives include no anxiety, blurred vision, neck pain, orthopnea, PND, shortness of breath or sweats. Agents associated with hypertension include anorectics, NSAIDs and thyroid hormones. Risk factors for coronary artery disease include obesity. There are no compliance problems.      Review of Systems   Constitutional:  Positive for malaise/fatigue.   Eyes:  Negative for blurred vision.   Respiratory:  Negative for shortness of breath.    Cardiovascular:  Positive for palpitations. Negative for orthopnea and PND.   Musculoskeletal:  Negative for neck pain.   Neurological:  Positive for headaches.       Objective   /86   Pulse 101   Temp 98.2 °F (36.8 °C) (Tympanic)   Ht 5' 8\" (1.727 m)   Wt 126 kg (277 lb)   SpO2 99%   BMI 42.12 kg/m²      Physical Exam  Vitals and nursing note reviewed.   Constitutional:       General: She is not in acute distress.     Appearance: She is well-groomed. She is morbidly obese. She is not ill-appearing, toxic-appearing or diaphoretic. "   HENT:      Head: Normocephalic and atraumatic.      Nose: Nose normal.      Mouth/Throat:      Mouth: Mucous membranes are moist.     Eyes:      Conjunctiva/sclera: Conjunctivae normal.     Pulmonary:      Effort: Pulmonary effort is normal.     Skin:     Coloration: Skin is not pale.     Neurological:      General: No focal deficit present.      Mental Status: She is alert and oriented to person, place, and time.      Gait: Gait normal.     Psychiatric:         Mood and Affect: Mood normal.         Behavior: Behavior normal. Behavior is cooperative.         Cognition and Memory: Cognition and memory normal.         Judgment: Judgment normal.

## 2025-07-08 NOTE — PROGRESS NOTES
Pt attended pre-op education session. Standardized packet of information for bariatric surgery was provided and reviewed with pt. Importance of lifestyle change and development of regular exercise routine stressed.   Pt. educated on two-week pre operative liquid protein liver shrinking diet.  Pt understands that the diet needs to be followed for 2 weeks prior to surgery. Handout reviewed.   Emphasized the need to drink 80 ounces of fluid per day while on the diet and to contact PCP to adjust any diabetes or blood pressure medicines prior to starting the diet.  Patient will not eat any solid food for 2 days prior to surgery, including 2 cups of non starchy vegetables to ensure that the stomach will be empty day of surgery. Reviewed Ensure pre-surgery ERAS drink instructions, protein supplement suggestions, post-operative clear liquid, full liquid, and pureed diet stages, post-operative nutrition rules and facts, and post-operative bariatric multivitamin/mineral recommendations and brand comparison. Reviewed instructions for stopping or tapering anti-obesity medications prior to surgery.      Pt given the opportunity to ask questions. Questions were answered. Pt verbalized understanding of all information provided. Pt appeared prepared for upcoming surgery. Contact information provided for any questions/concerns.

## 2025-07-10 ENCOUNTER — OFFICE VISIT (OUTPATIENT)
Dept: BARIATRICS | Facility: CLINIC | Age: 40
End: 2025-07-10
Payer: COMMERCIAL

## 2025-07-10 ENCOUNTER — CLINICAL SUPPORT (OUTPATIENT)
Dept: BARIATRICS | Facility: CLINIC | Age: 40
End: 2025-07-10

## 2025-07-10 VITALS
SYSTOLIC BLOOD PRESSURE: 126 MMHG | HEIGHT: 68 IN | OXYGEN SATURATION: 98 % | HEART RATE: 84 BPM | BODY MASS INDEX: 42.13 KG/M2 | TEMPERATURE: 97.7 F | WEIGHT: 278 LBS | DIASTOLIC BLOOD PRESSURE: 84 MMHG

## 2025-07-10 DIAGNOSIS — G47.33 OSA (OBSTRUCTIVE SLEEP APNEA): ICD-10-CM

## 2025-07-10 DIAGNOSIS — R73.03 PREDIABETES: ICD-10-CM

## 2025-07-10 DIAGNOSIS — E66.813 OBESITY, CLASS III, BMI 40-49.9 (MORBID OBESITY): Primary | ICD-10-CM

## 2025-07-10 DIAGNOSIS — M25.562 CHRONIC PAIN OF BOTH KNEES: ICD-10-CM

## 2025-07-10 DIAGNOSIS — J45.909 UNCOMPLICATED ASTHMA, UNSPECIFIED ASTHMA SEVERITY, UNSPECIFIED WHETHER PERSISTENT: ICD-10-CM

## 2025-07-10 DIAGNOSIS — E66.813 CLASS 3 SEVERE OBESITY DUE TO EXCESS CALORIES WITH SERIOUS COMORBIDITY AND BODY MASS INDEX (BMI) OF 50.0 TO 59.9 IN ADULT: Primary | ICD-10-CM

## 2025-07-10 DIAGNOSIS — M25.561 CHRONIC PAIN OF BOTH KNEES: ICD-10-CM

## 2025-07-10 DIAGNOSIS — G89.29 CHRONIC PAIN OF BOTH KNEES: ICD-10-CM

## 2025-07-10 PROCEDURE — RECHECK: Performed by: DIETITIAN, REGISTERED

## 2025-07-10 PROCEDURE — 99213 OFFICE O/P EST LOW 20 MIN: CPT | Performed by: SURGERY

## 2025-07-10 RX ORDER — CELECOXIB 200 MG/1
200 CAPSULE ORAL ONCE
OUTPATIENT
Start: 2025-07-10 | End: 2025-07-10

## 2025-07-10 RX ORDER — HEPARIN SODIUM 5000 [USP'U]/ML
5000 INJECTION, SOLUTION INTRAVENOUS; SUBCUTANEOUS ONCE
OUTPATIENT
Start: 2025-07-10 | End: 2025-07-10

## 2025-07-10 RX ORDER — OMEPRAZOLE 20 MG/1
20 CAPSULE, DELAYED RELEASE ORAL DAILY
Qty: 90 CAPSULE | Refills: 1 | Status: SHIPPED | OUTPATIENT
Start: 2025-07-10

## 2025-07-10 RX ORDER — ACETAMINOPHEN 10 MG/ML
1000 INJECTION, SOLUTION INTRAVENOUS ONCE
OUTPATIENT
Start: 2025-07-10 | End: 2025-07-10

## 2025-07-10 NOTE — ASSESSMENT & PLAN NOTE
40 y.o. female morbidly obese found to be a good candidate to undergo a weight loss operation upon being enrolled here at the [facility].    Patient has a long history of morbid obesity and is presenting to discuss the surgical weight loss options. Despite the patient best efforts patient was unable to lose any meaningful or sustainable weight using nonsurgical means.We had a long discussion regarding all the surgical weight-loss options at our disposal at this point and reviewed the risks and benefits of each procedure in details as it relates to her age, BMI and medical conditions.    She has been pre certified to undergo a Laparoscopic Mandy-en-Y gastric bypass.    Here today to review her pre op test results.      Has been medically cleared for the procedure.      I have discussed with her at length the risks and benefits of the operation and reiterated the components of our multidisciplinary program and the importance of compliance and follow up in the post operative period. Although there is a great statistical chance of improvement or even resolution of most of her associated comorbidities, the results vary from patient to patient and they largely depend on her commitment.     The patient was also instructed with regards to the importance of behavior modification, nutritional counseling, support meeting attendance and lifestyle changes that are important to ensure success.    I have explained and reviewed the instructions for stopping or tapering anti-obesity medications prior to surgery.  She was given the opportunity to ask questions and I have answered all of them.     I have addressed with the patient the level of CODE STATUS for this hospital stay and after explaining the different options currently she wishes to be a Level I.    She understands and wishes to proceed.    She has lost all the weight required prior to surgery.

## 2025-07-10 NOTE — TELEPHONE ENCOUNTER
Pt Is Having  BYPASS GASTRIC  JEANA-EN-Y  With Dr. Moreno  on  07/28/2025      PO Meds for  BYPASS GASTRIC  JEANA-EN-Y   on 07/28/2025  With Dr. Moreno      *Pt Has ALLERGIES to     Blackberry Flavor - Food Allergy Not Specified Hives    Shellfish-derived Products - Food Allergy Not Specified     Tetanus Toxoid Not Specified Hives, Edema

## 2025-07-10 NOTE — ASSESSMENT & PLAN NOTE
Currently on metformin  I have discussed with the patient that there is a significant chance of improvement or even resolution of this condition after metabolic/bariatric surgery.  Continue management as per primary care team.

## 2025-07-10 NOTE — ASSESSMENT & PLAN NOTE
Currently taking Symbicort  I have discussed with the patient that there is a significant chance of improvement or even resolution of this condition after metabolic/bariatric surgery.  Continue management as per primary care team.

## 2025-07-10 NOTE — H&P
"BARIATRIC H&P - BARIATRIC SURGERY  Shelley Tineo 40 y.o. female MRN: [id number]  Unit/Bed#:  Encounter: [id number]      HPI:  Shelley Tineo is a 40 y.o. female who presents with a long-standing history of morbid obesity.    She was found to be a good candidate to undergo a bariatric operation upon being [facility].    She is here today to discuss details of her surgery.    Review of Systems   All other systems reviewed and are negative.      Historical Information   Past Medical History[1]  Past Surgical History[2]  Social History   Social History     Substance and Sexual Activity   Alcohol Use Not Currently    Comment: rare     Social History     Substance and Sexual Activity   Drug Use Never     Tobacco Use History[3]  Family History: Family history non-contributory    Meds/Allergies   all medications and allergies reviewed  Allergies[4]    Objective     Current Vitals:   Blood Pressure: 126/84 (07/10/25 1120)  Pulse: 84 (07/10/25 1120)  Temperature: 97.7 °F (36.5 °C) (07/10/25 1120)  Height: 5' 8\" (172.7 cm) (07/10/25 1120)  Weight - Scale: 126 kg (278 lb) (07/10/25 1120)  SpO2: 98 % (07/10/25 1120)      Invasive Devices       None                   Physical Exam  Vitals and nursing note reviewed.   Constitutional:       Appearance: Normal appearance. She is well-developed.   HENT:      Head: Normocephalic and atraumatic.      Nose: Nose normal.     Eyes:      General: No scleral icterus.        Right eye: No discharge.         Left eye: No discharge.      Conjunctiva/sclera: Conjunctivae normal.       Cardiovascular:      Rate and Rhythm: Normal rate and regular rhythm.      Heart sounds: Normal heart sounds.   Pulmonary:      Effort: Pulmonary effort is normal.      Breath sounds: Normal breath sounds. No stridor. No wheezing or rales.   Chest:      Chest wall: No tenderness.   Abdominal:      General: Bowel sounds are normal.      Palpations: Abdomen is soft.      Tenderness: " There is no abdominal tenderness. There is no guarding or rebound.      Comments: Abdomen is obese, soft and benign.     Musculoskeletal:         General: Normal range of motion.      Cervical back: Normal range of motion and neck supple.   Lymphadenopathy:      Cervical: No cervical adenopathy.     Skin:     General: Skin is warm and dry.      Findings: No erythema or rash.     Neurological:      Mental Status: She is alert and oriented to person, place, and time.     Psychiatric:         Behavior: Behavior normal.         Thought Content: Thought content normal.         Judgment: Judgment normal.         Lab Results: I have personally reviewed pertinent lab results.    Imaging: Results Review Statement: No pertinent imaging studies reviewed.  EKG, Pathology, and Other Studies: Results Review Statement: No pertinent imaging studies reviewed.  The endoscopy showed normal anatomy.  The biopsies revealed  Final Diagnosis   A. Duodenum, r/o celiac:  Duodenal mucosa with preserved villous architecture  No histomorphologic features of celiac disease identified  No dysplasia, malignancy or parasites identified     B. Stomach, r/o h pylori:  Chronic inactive gastritis and reactive gastropathy  No H. pylori identified on H&E stained slide     C. Stomach, polyp:  Fundic gland polyp       Assessment/PLAN:    Uncomplicated asthma, unspecified asthma severity, unspecified whether persistent  Currently taking Symbicort  I have discussed with the patient that there is a significant chance of improvement or even resolution of this condition after metabolic/bariatric surgery.  Continue management as per primary care team.      Chronic pain of both knees  I have discussed with the patient that there is a significant chance of improvement or even resolution of this condition after metabolic/bariatric surgery.  Continue management as per primary care team.      Prediabetes  Currently on metformin  I have discussed with the patient  that there is a significant chance of improvement or even resolution of this condition after metabolic/bariatric surgery.  Continue management as per primary care team.      KAMLA (obstructive sleep apnea)  Patient has AKMLA and wears a CPAP machine.  I have discussed with the patient that there is a significant chance of improvement or even resolution of this condition after metabolic/bariatric surgery.  Continue management as per primary care team.      Obesity, Class III, BMI 40-49.9 (morbid obesity)  40 y.o. female morbidly obese found to be a good candidate to undergo a weight loss operation upon being enrolled here at the [facility].    Patient has a long history of morbid obesity and is presenting to discuss the surgical weight loss options. Despite the patient best efforts patient was unable to lose any meaningful or sustainable weight using nonsurgical means.We had a long discussion regarding all the surgical weight-loss options at our disposal at this point and reviewed the risks and benefits of each procedure in details as it relates to her age, BMI and medical conditions.    She has been pre certified to undergo a Laparoscopic Mandy-en-Y gastric bypass.    Here today to review her pre op test results.      Has been medically cleared for the procedure.      I have discussed with her at length the risks and benefits of the operation and reiterated the components of our multidisciplinary program and the importance of compliance and follow up in the post operative period. Although there is a great statistical chance of improvement or even resolution of most of her associated comorbidities, the results vary from patient to patient and they largely depend on her commitment.     The patient was also instructed with regards to the importance of behavior modification, nutritional counseling, support meeting attendance and lifestyle changes that are important to ensure success.    I have explained  and reviewed the instructions for stopping or tapering anti-obesity medications prior to surgery.  She was given the opportunity to ask questions and I have answered all of them.     I have addressed with the patient the level of CODE STATUS for this hospital stay and after explaining the different options currently she wishes to be a Level I.    She understands and wishes to proceed.    She has lost all the weight required prior to surgery.      Luis Moreno MD  7/10/2025  11:50 AM       [1]   Past Medical History:  Diagnosis Date    Conjunctivitis 2016    Disease of thyroid gland     GERD (gastroesophageal reflux disease)     Hypertension     MRSA infection     bladder/ urine    Nasal congestion     KAMLA on CPAP     Personal history of COVID-19 2022 - has lingering wheezing PASC      Post-acute sequelae of COVID-19 (PASC) 2022   [2]   Past Surgical History:  Procedure Laterality Date     SECTION      EYE SURGERY      strabismus    TOOTH EXTRACTION     [3]   Social History  Tobacco Use   Smoking Status Never   Smokeless Tobacco Never   [4]   Allergies  Allergen Reactions    Blackberry Flavor - Food Allergy Hives    Shellfish-Derived Products - Food Allergy     Tetanus Toxoid Hives and Edema

## 2025-07-17 ENCOUNTER — TELEPHONE (OUTPATIENT)
Dept: BARIATRICS | Facility: CLINIC | Age: 40
End: 2025-07-17

## 2025-07-18 ENCOUNTER — TELEPHONE (OUTPATIENT)
Dept: FAMILY MEDICINE CLINIC | Facility: CLINIC | Age: 40
End: 2025-07-18

## 2025-07-18 NOTE — TELEPHONE ENCOUNTER
Received [place] ppwk for pt's  today regarding pt's upcoming surgery. Per patient, surgeons office informed her that they are unable to fill out ppwk for pt's spouse and pt's PCP would have to. Placed ppwk in provider's bin.

## 2025-07-21 ENCOUNTER — TELEPHONE (OUTPATIENT)
Dept: BARIATRICS | Facility: CLINIC | Age: 40
End: 2025-07-21

## 2025-07-24 ENCOUNTER — ANESTHESIA EVENT (OUTPATIENT)
Dept: PERIOP | Facility: HOSPITAL | Age: 40
End: 2025-07-24
Payer: COMMERCIAL

## 2025-07-24 DIAGNOSIS — J34.89 NASAL DRAINAGE: ICD-10-CM

## 2025-07-24 DIAGNOSIS — R05.3 CHRONIC COUGH: ICD-10-CM

## 2025-07-25 RX ORDER — AZELASTINE HYDROCHLORIDE 137 UG/1
SPRAY, METERED NASAL
Qty: 30 ML | Refills: 5 | Status: SHIPPED | OUTPATIENT
Start: 2025-07-25

## 2025-07-28 ENCOUNTER — ANESTHESIA (OUTPATIENT)
Dept: PERIOP | Facility: HOSPITAL | Age: 40
End: 2025-07-28
Payer: COMMERCIAL

## 2025-07-28 ENCOUNTER — HOSPITAL ENCOUNTER (OUTPATIENT)
Facility: HOSPITAL | Age: 40
Setting detail: OUTPATIENT SURGERY
Discharge: HOME | End: 2025-07-29
Attending: SURGERY | Admitting: SURGERY
Payer: COMMERCIAL

## 2025-07-28 DIAGNOSIS — E66.813 CLASS 3 SEVERE OBESITY DUE TO EXCESS CALORIES WITH SERIOUS COMORBIDITY AND BODY MASS INDEX (BMI) OF 50.0 TO 59.9 IN ADULT: Primary | ICD-10-CM

## 2025-07-28 LAB
EXT PREGNANCY TEST URINE: NEGATIVE
EXT. CONTROL: NORMAL
GLUCOSE SERPL-MCNC: 189 MG/DL (ref 65–140)
GLUCOSE SERPL-MCNC: 209 MG/DL (ref 65–140)

## 2025-07-28 PROCEDURE — 43644 LAP GASTRIC BYPASS/ROUX-EN-Y: CPT | Performed by: SURGERY

## 2025-07-28 PROCEDURE — 82948 REAGENT STRIP/BLOOD GLUCOSE: CPT

## 2025-07-28 PROCEDURE — 81025 URINE PREGNANCY TEST: CPT | Performed by: STUDENT IN AN ORGANIZED HEALTH CARE EDUCATION/TRAINING PROGRAM

## 2025-07-28 PROCEDURE — 43644 LAP GASTRIC BYPASS/ROUX-EN-Y: CPT | Performed by: STUDENT IN AN ORGANIZED HEALTH CARE EDUCATION/TRAINING PROGRAM

## 2025-07-28 PROCEDURE — C1781 MESH (IMPLANTABLE): HCPCS | Performed by: SURGERY

## 2025-07-28 DEVICE — SEAMGUARD STPL REINF ENDO GIA ULTRA UNIV 60 PURPLE: Type: IMPLANTABLE DEVICE | Site: STOMACH | Status: FUNCTIONAL

## 2025-07-28 RX ORDER — CELECOXIB 200 MG/1
200 CAPSULE ORAL ONCE
Status: COMPLETED | OUTPATIENT
Start: 2025-07-28 | End: 2025-07-28

## 2025-07-28 RX ORDER — ROCURONIUM BROMIDE 10 MG/ML
INJECTION, SOLUTION INTRAVENOUS AS NEEDED
Status: DISCONTINUED | OUTPATIENT
Start: 2025-07-28 | End: 2025-07-28

## 2025-07-28 RX ORDER — DIPHENHYDRAMINE HCL 25 MG
25 TABLET ORAL EVERY 8 HOURS PRN
Status: DISCONTINUED | OUTPATIENT
Start: 2025-07-28 | End: 2025-07-29 | Stop reason: HOSPADM

## 2025-07-28 RX ORDER — EPHEDRINE SULFATE 50 MG/ML
INJECTION INTRAVENOUS AS NEEDED
Status: DISCONTINUED | OUTPATIENT
Start: 2025-07-28 | End: 2025-07-28

## 2025-07-28 RX ORDER — FENTANYL CITRATE/PF 50 MCG/ML
25 SYRINGE (ML) INJECTION
Status: DISCONTINUED | OUTPATIENT
Start: 2025-07-28 | End: 2025-07-28 | Stop reason: HOSPADM

## 2025-07-28 RX ORDER — SIMETHICONE 80 MG
80 TABLET,CHEWABLE ORAL 4 TIMES DAILY PRN
Status: DISCONTINUED | OUTPATIENT
Start: 2025-07-28 | End: 2025-07-29 | Stop reason: HOSPADM

## 2025-07-28 RX ORDER — METRONIDAZOLE 500 MG/100ML
500 INJECTION, SOLUTION INTRAVENOUS EVERY 12 HOURS
Status: COMPLETED | OUTPATIENT
Start: 2025-07-28 | End: 2025-07-29

## 2025-07-28 RX ORDER — SODIUM CHLORIDE, SODIUM LACTATE, POTASSIUM CHLORIDE, CALCIUM CHLORIDE 600; 310; 30; 20 MG/100ML; MG/100ML; MG/100ML; MG/100ML
125 INJECTION, SOLUTION INTRAVENOUS CONTINUOUS
Status: DISCONTINUED | OUTPATIENT
Start: 2025-07-28 | End: 2025-07-28 | Stop reason: DRUGHIGH

## 2025-07-28 RX ORDER — MAGNESIUM HYDROXIDE 1200 MG/15ML
LIQUID ORAL AS NEEDED
Status: DISCONTINUED | OUTPATIENT
Start: 2025-07-28 | End: 2025-07-28 | Stop reason: HOSPADM

## 2025-07-28 RX ORDER — METRONIDAZOLE 500 MG/100ML
500 INJECTION, SOLUTION INTRAVENOUS ONCE
Status: COMPLETED | OUTPATIENT
Start: 2025-07-28 | End: 2025-07-28

## 2025-07-28 RX ORDER — LIDOCAINE HYDROCHLORIDE 20 MG/ML
INJECTION, SOLUTION EPIDURAL; INFILTRATION; INTRACAUDAL; PERINEURAL AS NEEDED
Status: DISCONTINUED | OUTPATIENT
Start: 2025-07-28 | End: 2025-07-28

## 2025-07-28 RX ORDER — CEFAZOLIN SODIUM 3 G/50ML
3000 SOLUTION INTRAVENOUS ONCE
Status: COMPLETED | OUTPATIENT
Start: 2025-07-28 | End: 2025-07-28

## 2025-07-28 RX ORDER — MEPERIDINE HYDROCHLORIDE 25 MG/ML
12.5 INJECTION INTRAMUSCULAR; INTRAVENOUS; SUBCUTANEOUS
Status: DISCONTINUED | OUTPATIENT
Start: 2025-07-28 | End: 2025-07-28 | Stop reason: HOSPADM

## 2025-07-28 RX ORDER — DEXAMETHASONE SODIUM PHOSPHATE 10 MG/ML
INJECTION, SOLUTION INTRAMUSCULAR; INTRAVENOUS AS NEEDED
Status: DISCONTINUED | OUTPATIENT
Start: 2025-07-28 | End: 2025-07-28

## 2025-07-28 RX ORDER — PROMETHAZINE HYDROCHLORIDE 25 MG/ML
25 INJECTION, SOLUTION INTRAMUSCULAR; INTRAVENOUS EVERY 6 HOURS PRN
Status: DISCONTINUED | OUTPATIENT
Start: 2025-07-28 | End: 2025-07-29

## 2025-07-28 RX ORDER — FENTANYL CITRATE 50 UG/ML
INJECTION, SOLUTION INTRAMUSCULAR; INTRAVENOUS AS NEEDED
Status: DISCONTINUED | OUTPATIENT
Start: 2025-07-28 | End: 2025-07-28

## 2025-07-28 RX ORDER — HEPARIN SODIUM 5000 [USP'U]/ML
5000 INJECTION, SOLUTION INTRAVENOUS; SUBCUTANEOUS ONCE
Status: COMPLETED | OUTPATIENT
Start: 2025-07-28 | End: 2025-07-28

## 2025-07-28 RX ORDER — ACETAMINOPHEN 10 MG/ML
1000 INJECTION, SOLUTION INTRAVENOUS ONCE
Status: COMPLETED | OUTPATIENT
Start: 2025-07-28 | End: 2025-07-28

## 2025-07-28 RX ORDER — FAMOTIDINE 10 MG/ML
20 INJECTION, SOLUTION INTRAVENOUS 2 TIMES DAILY
Status: DISCONTINUED | OUTPATIENT
Start: 2025-07-28 | End: 2025-07-29 | Stop reason: HOSPADM

## 2025-07-28 RX ORDER — HYDROMORPHONE HCL/PF 1 MG/ML
SYRINGE (ML) INJECTION AS NEEDED
Status: DISCONTINUED | OUTPATIENT
Start: 2025-07-28 | End: 2025-07-28

## 2025-07-28 RX ORDER — HYDROMORPHONE HCL/PF 1 MG/ML
0.5 SYRINGE (ML) INJECTION
Status: DISCONTINUED | OUTPATIENT
Start: 2025-07-28 | End: 2025-07-28 | Stop reason: HOSPADM

## 2025-07-28 RX ORDER — ONDANSETRON 2 MG/ML
4 INJECTION INTRAMUSCULAR; INTRAVENOUS ONCE AS NEEDED
Status: COMPLETED | OUTPATIENT
Start: 2025-07-28 | End: 2025-07-28

## 2025-07-28 RX ORDER — SODIUM CHLORIDE, SODIUM LACTATE, POTASSIUM CHLORIDE, CALCIUM CHLORIDE 600; 310; 30; 20 MG/100ML; MG/100ML; MG/100ML; MG/100ML
100 INJECTION, SOLUTION INTRAVENOUS CONTINUOUS
Status: DISCONTINUED | OUTPATIENT
Start: 2025-07-28 | End: 2025-07-29 | Stop reason: HOSPADM

## 2025-07-28 RX ORDER — OXYCODONE HCL 5 MG/5 ML
10 SOLUTION, ORAL ORAL EVERY 4 HOURS PRN
Status: DISCONTINUED | OUTPATIENT
Start: 2025-07-28 | End: 2025-07-29 | Stop reason: HOSPADM

## 2025-07-28 RX ORDER — ONDANSETRON 2 MG/ML
INJECTION INTRAMUSCULAR; INTRAVENOUS AS NEEDED
Status: DISCONTINUED | OUTPATIENT
Start: 2025-07-28 | End: 2025-07-28

## 2025-07-28 RX ORDER — MIDAZOLAM HYDROCHLORIDE 2 MG/2ML
INJECTION, SOLUTION INTRAMUSCULAR; INTRAVENOUS AS NEEDED
Status: DISCONTINUED | OUTPATIENT
Start: 2025-07-28 | End: 2025-07-28

## 2025-07-28 RX ORDER — LABETALOL HYDROCHLORIDE 5 MG/ML
10 INJECTION, SOLUTION INTRAVENOUS ONCE
Status: COMPLETED | OUTPATIENT
Start: 2025-07-28 | End: 2025-07-28

## 2025-07-28 RX ORDER — ONDANSETRON 2 MG/ML
4 INJECTION INTRAMUSCULAR; INTRAVENOUS EVERY 6 HOURS PRN
Status: DISCONTINUED | OUTPATIENT
Start: 2025-07-28 | End: 2025-07-29 | Stop reason: HOSPADM

## 2025-07-28 RX ORDER — PROPOFOL 10 MG/ML
INJECTION, EMULSION INTRAVENOUS AS NEEDED
Status: DISCONTINUED | OUTPATIENT
Start: 2025-07-28 | End: 2025-07-28

## 2025-07-28 RX ORDER — MORPHINE SULFATE 4 MG/ML
4 INJECTION, SOLUTION INTRAMUSCULAR; INTRAVENOUS EVERY 4 HOURS PRN
Status: DISCONTINUED | OUTPATIENT
Start: 2025-07-28 | End: 2025-07-29 | Stop reason: HOSPADM

## 2025-07-28 RX ORDER — CEFAZOLIN SODIUM 2 G/50ML
2000 SOLUTION INTRAVENOUS EVERY 8 HOURS
Status: COMPLETED | OUTPATIENT
Start: 2025-07-28 | End: 2025-07-29

## 2025-07-28 RX ORDER — HYDRALAZINE HYDROCHLORIDE 20 MG/ML
5 INJECTION INTRAMUSCULAR; INTRAVENOUS ONCE
Status: COMPLETED | OUTPATIENT
Start: 2025-07-28 | End: 2025-07-28

## 2025-07-28 RX ORDER — OXYCODONE HCL 5 MG/5 ML
5 SOLUTION, ORAL ORAL EVERY 4 HOURS PRN
Status: DISCONTINUED | OUTPATIENT
Start: 2025-07-28 | End: 2025-07-29 | Stop reason: HOSPADM

## 2025-07-28 RX ORDER — LEVOTHYROXINE SODIUM 75 UG/1
75 TABLET ORAL
Status: DISCONTINUED | OUTPATIENT
Start: 2025-07-29 | End: 2025-07-29 | Stop reason: HOSPADM

## 2025-07-28 RX ORDER — ACETAMINOPHEN 10 MG/ML
1000 INJECTION, SOLUTION INTRAVENOUS EVERY 6 HOURS SCHEDULED
Status: DISCONTINUED | OUTPATIENT
Start: 2025-07-28 | End: 2025-07-29

## 2025-07-28 RX ORDER — METOCLOPRAMIDE HYDROCHLORIDE 5 MG/ML
10 INJECTION INTRAMUSCULAR; INTRAVENOUS EVERY 6 HOURS PRN
Status: DISCONTINUED | OUTPATIENT
Start: 2025-07-28 | End: 2025-07-29 | Stop reason: HOSPADM

## 2025-07-28 RX ADMIN — HYDROMORPHONE HYDROCHLORIDE 0.5 MG: 1 INJECTION, SOLUTION INTRAMUSCULAR; INTRAVENOUS; SUBCUTANEOUS at 11:08

## 2025-07-28 RX ADMIN — ONDANSETRON 4 MG: 2 INJECTION INTRAMUSCULAR; INTRAVENOUS at 12:09

## 2025-07-28 RX ADMIN — FENTANYL CITRATE 25 MCG: 50 INJECTION INTRAMUSCULAR; INTRAVENOUS at 13:15

## 2025-07-28 RX ADMIN — ACETAMINOPHEN 1000 MG: 10 INJECTION, SOLUTION INTRAVENOUS at 18:05

## 2025-07-28 RX ADMIN — FAMOTIDINE 20 MG: 10 INJECTION, SOLUTION INTRAVENOUS at 21:04

## 2025-07-28 RX ADMIN — CELECOXIB 200 MG: 200 CAPSULE ORAL at 08:32

## 2025-07-28 RX ADMIN — EPHEDRINE SULFATE 10 MG: 50 INJECTION INTRAVENOUS at 11:00

## 2025-07-28 RX ADMIN — FOSAPREPITANT DIMEGLUMINE 150 MG: 150 INJECTION, POWDER, LYOPHILIZED, FOR SOLUTION INTRAVENOUS at 08:14

## 2025-07-28 RX ADMIN — CEFAZOLIN SODIUM 3000 MG: 3 SOLUTION INTRAVENOUS at 10:43

## 2025-07-28 RX ADMIN — ACETAMINOPHEN 1000 MG: 10 INJECTION INTRAVENOUS at 10:53

## 2025-07-28 RX ADMIN — ACETAMINOPHEN 1000 MG: 10 INJECTION, SOLUTION INTRAVENOUS at 23:55

## 2025-07-28 RX ADMIN — ROCURONIUM 70 MG: 50 INJECTION, SOLUTION INTRAVENOUS at 10:40

## 2025-07-28 RX ADMIN — DEXAMETHASONE SODIUM PHOSPHATE 10 MG: 10 INJECTION INTRAMUSCULAR; INTRAVENOUS at 10:52

## 2025-07-28 RX ADMIN — HYDROMORPHONE HYDROCHLORIDE 0.5 MG: 1 INJECTION, SOLUTION INTRAMUSCULAR; INTRAVENOUS; SUBCUTANEOUS at 11:05

## 2025-07-28 RX ADMIN — ONDANSETRON 4 MG: 2 INJECTION INTRAMUSCULAR; INTRAVENOUS at 13:00

## 2025-07-28 RX ADMIN — PROPOFOL 250 MG: 10 INJECTION, EMULSION INTRAVENOUS at 10:40

## 2025-07-28 RX ADMIN — CEFAZOLIN SODIUM 2000 MG: 2 SOLUTION INTRAVENOUS at 18:43

## 2025-07-28 RX ADMIN — METRONIDAZOLE 500 MG: 500 INJECTION, SOLUTION INTRAVENOUS at 23:23

## 2025-07-28 RX ADMIN — HYDRALAZINE HYDROCHLORIDE 5 MG: 20 INJECTION, SOLUTION INTRAMUSCULAR; INTRAVENOUS at 14:20

## 2025-07-28 RX ADMIN — EPHEDRINE SULFATE 10 MG: 50 INJECTION INTRAVENOUS at 12:09

## 2025-07-28 RX ADMIN — SODIUM CHLORIDE, SODIUM LACTATE, POTASSIUM CHLORIDE, AND CALCIUM CHLORIDE: .6; .31; .03; .02 INJECTION, SOLUTION INTRAVENOUS at 11:26

## 2025-07-28 RX ADMIN — METRONIDAZOLE: 500 INJECTION, SOLUTION INTRAVENOUS at 10:48

## 2025-07-28 RX ADMIN — ROCURONIUM 30 MG: 50 INJECTION, SOLUTION INTRAVENOUS at 11:34

## 2025-07-28 RX ADMIN — SUGAMMADEX 200 MG: 100 INJECTION, SOLUTION INTRAVENOUS at 12:22

## 2025-07-28 RX ADMIN — SODIUM CHLORIDE, SODIUM LACTATE, POTASSIUM CHLORIDE, AND CALCIUM CHLORIDE 125 ML/HR: .6; .31; .03; .02 INJECTION, SOLUTION INTRAVENOUS at 14:28

## 2025-07-28 RX ADMIN — FENTANYL CITRATE 100 MCG: 50 INJECTION INTRAMUSCULAR; INTRAVENOUS at 10:37

## 2025-07-28 RX ADMIN — SODIUM CHLORIDE, SODIUM LACTATE, POTASSIUM CHLORIDE, AND CALCIUM CHLORIDE: .6; .31; .03; .02 INJECTION, SOLUTION INTRAVENOUS at 10:30

## 2025-07-28 RX ADMIN — FENTANYL CITRATE 25 MCG: 50 INJECTION INTRAMUSCULAR; INTRAVENOUS at 13:54

## 2025-07-28 RX ADMIN — LIDOCAINE HYDROCHLORIDE 100 MG: 20 INJECTION, SOLUTION EPIDURAL; INFILTRATION; INTRACAUDAL at 10:40

## 2025-07-28 RX ADMIN — HYDRALAZINE HYDROCHLORIDE 5 MG: 20 INJECTION, SOLUTION INTRAMUSCULAR; INTRAVENOUS at 12:57

## 2025-07-28 RX ADMIN — LABETALOL HYDROCHLORIDE 10 MG: 5 INJECTION, SOLUTION INTRAVENOUS at 13:36

## 2025-07-28 RX ADMIN — HYDROMORPHONE HYDROCHLORIDE 0.5 MG: 0.5 INJECTION, SOLUTION INTRAMUSCULAR; INTRAVENOUS; SUBCUTANEOUS at 14:24

## 2025-07-28 RX ADMIN — MIDAZOLAM 2 MG: 1 INJECTION INTRAMUSCULAR; INTRAVENOUS at 10:37

## 2025-07-28 RX ADMIN — HEPARIN SODIUM 5000 UNITS: 5000 INJECTION INTRAVENOUS; SUBCUTANEOUS at 08:32

## 2025-07-29 VITALS
SYSTOLIC BLOOD PRESSURE: 153 MMHG | OXYGEN SATURATION: 96 % | WEIGHT: 277.34 LBS | HEART RATE: 95 BPM | DIASTOLIC BLOOD PRESSURE: 90 MMHG | RESPIRATION RATE: 17 BRPM | HEIGHT: 68 IN | BODY MASS INDEX: 42.03 KG/M2 | TEMPERATURE: 98 F

## 2025-07-29 LAB
ANION GAP SERPL CALCULATED.3IONS-SCNC: 9 MMOL/L (ref 4–13)
BUN SERPL-MCNC: 10 MG/DL (ref 5–25)
CALCIUM SERPL-MCNC: 9.2 MG/DL (ref 8.4–10.2)
CHLORIDE SERPL-SCNC: 106 MMOL/L (ref 96–108)
CO2 SERPL-SCNC: 23 MMOL/L (ref 21–32)
CREAT SERPL-MCNC: 0.71 MG/DL (ref 0.6–1.3)
ERYTHROCYTE [DISTWIDTH] IN BLOOD BY AUTOMATED COUNT: 16.3 % (ref 11.6–15.1)
GFR SERPL CREATININE-BSD FRML MDRD: 108 ML/MIN/1.73SQ M
GLUCOSE SERPL-MCNC: 135 MG/DL (ref 65–140)
HCT VFR BLD AUTO: 32.2 % (ref 34.8–46.1)
HGB BLD-MCNC: 10.5 G/DL (ref 11.5–15.4)
MCH RBC QN AUTO: 27.3 PG (ref 26.8–34.3)
MCHC RBC AUTO-ENTMCNC: 32.6 G/DL (ref 31.4–37.4)
MCV RBC AUTO: 84 FL (ref 82–98)
PLATELET # BLD AUTO: 371 THOUSANDS/UL (ref 149–390)
PMV BLD AUTO: 10.2 FL (ref 8.9–12.7)
POTASSIUM SERPL-SCNC: 4.2 MMOL/L (ref 3.5–5.3)
RBC # BLD AUTO: 3.85 MILLION/UL (ref 3.81–5.12)
SODIUM SERPL-SCNC: 138 MMOL/L (ref 135–147)
WBC # BLD AUTO: 16.76 THOUSAND/UL (ref 4.31–10.16)

## 2025-07-29 PROCEDURE — 80048 BASIC METABOLIC PNL TOTAL CA: CPT | Performed by: STUDENT IN AN ORGANIZED HEALTH CARE EDUCATION/TRAINING PROGRAM

## 2025-07-29 PROCEDURE — 99024 POSTOP FOLLOW-UP VISIT: CPT | Performed by: PHYSICIAN ASSISTANT

## 2025-07-29 PROCEDURE — 85027 COMPLETE CBC AUTOMATED: CPT | Performed by: STUDENT IN AN ORGANIZED HEALTH CARE EDUCATION/TRAINING PROGRAM

## 2025-07-29 RX ORDER — OXYCODONE HYDROCHLORIDE 5 MG/1
5 TABLET ORAL EVERY 4 HOURS PRN
Qty: 5 TABLET | Refills: 0 | Status: SHIPPED | OUTPATIENT
Start: 2025-07-29

## 2025-07-29 RX ORDER — ACETAMINOPHEN 325 MG/1
975 TABLET ORAL EVERY 6 HOURS
Status: DISCONTINUED | OUTPATIENT
Start: 2025-07-29 | End: 2025-07-29 | Stop reason: HOSPADM

## 2025-07-29 RX ORDER — PROMETHAZINE HYDROCHLORIDE 25 MG/ML
25 INJECTION, SOLUTION INTRAMUSCULAR; INTRAVENOUS EVERY 6 HOURS PRN
Status: DISCONTINUED | OUTPATIENT
Start: 2025-07-29 | End: 2025-07-29 | Stop reason: HOSPADM

## 2025-07-29 RX ADMIN — ACETAMINOPHEN 1000 MG: 10 INJECTION, SOLUTION INTRAVENOUS at 05:23

## 2025-07-29 RX ADMIN — CEFAZOLIN SODIUM 2000 MG: 2 SOLUTION INTRAVENOUS at 01:59

## 2025-07-29 RX ADMIN — SODIUM CHLORIDE, SODIUM LACTATE, POTASSIUM CHLORIDE, AND CALCIUM CHLORIDE 100 ML/HR: .6; .31; .03; .02 INJECTION, SOLUTION INTRAVENOUS at 01:59

## 2025-07-29 RX ADMIN — ACETAMINOPHEN 975 MG: 325 TABLET ORAL at 13:36

## 2025-07-29 RX ADMIN — LEVOTHYROXINE SODIUM 75 MCG: 0.07 TABLET ORAL at 05:23

## 2025-07-29 RX ADMIN — OXYCODONE HYDROCHLORIDE 5 MG: 5 SOLUTION ORAL at 00:01

## 2025-07-29 RX ADMIN — FAMOTIDINE 20 MG: 10 INJECTION, SOLUTION INTRAVENOUS at 09:09

## 2025-07-29 RX ADMIN — CEFAZOLIN SODIUM 2000 MG: 2 SOLUTION INTRAVENOUS at 11:03

## 2025-07-29 RX ADMIN — OXYCODONE HYDROCHLORIDE 5 MG: 5 SOLUTION ORAL at 09:21

## 2025-07-30 ENCOUNTER — TELEPHONE (OUTPATIENT)
Dept: BARIATRICS | Facility: CLINIC | Age: 40
End: 2025-07-30

## 2025-07-30 ENCOUNTER — PATIENT MESSAGE (OUTPATIENT)
Dept: FAMILY MEDICINE CLINIC | Facility: CLINIC | Age: 40
End: 2025-07-30

## 2025-08-02 ENCOUNTER — PATIENT MESSAGE (OUTPATIENT)
Dept: BARIATRICS | Facility: CLINIC | Age: 40
End: 2025-08-02

## 2025-08-05 ENCOUNTER — PATIENT MESSAGE (OUTPATIENT)
Dept: BARIATRICS | Facility: CLINIC | Age: 40
End: 2025-08-05

## 2025-08-07 ENCOUNTER — CLINICAL SUPPORT (OUTPATIENT)
Dept: BARIATRICS | Facility: CLINIC | Age: 40
End: 2025-08-07

## 2025-08-07 ENCOUNTER — TELEPHONE (OUTPATIENT)
Age: 40
End: 2025-08-07

## 2025-08-07 ENCOUNTER — OFFICE VISIT (OUTPATIENT)
Dept: BARIATRICS | Facility: CLINIC | Age: 40
End: 2025-08-07

## 2025-08-07 VITALS
BODY MASS INDEX: 39.03 KG/M2 | TEMPERATURE: 98.2 F | OXYGEN SATURATION: 96 % | WEIGHT: 257.5 LBS | DIASTOLIC BLOOD PRESSURE: 76 MMHG | SYSTOLIC BLOOD PRESSURE: 124 MMHG | HEIGHT: 68 IN | HEART RATE: 92 BPM

## 2025-08-07 DIAGNOSIS — Z98.84 HISTORY OF ROUX-EN-Y GASTRIC BYPASS: ICD-10-CM

## 2025-08-07 DIAGNOSIS — E66.812 OBESITY, CLASS II, BMI 35-39.9: Primary | ICD-10-CM

## 2025-08-07 DIAGNOSIS — Z98.84 HISTORY OF ROUX-EN-Y GASTRIC BYPASS: Primary | ICD-10-CM

## 2025-08-07 PROCEDURE — 99024 POSTOP FOLLOW-UP VISIT: CPT | Performed by: SURGERY

## 2025-08-07 PROCEDURE — RECHECK: Performed by: DIETITIAN, REGISTERED

## (undated) DEVICE — Device

## (undated) DEVICE — TUBING SMOKE EVAC W/FILTRATION DEVICE PLUMEPORT ACTIV

## (undated) DEVICE — SLEEVE SCD KNEE MEDIUM